# Patient Record
Sex: MALE | Race: OTHER | NOT HISPANIC OR LATINO | Employment: FULL TIME | URBAN - METROPOLITAN AREA
[De-identification: names, ages, dates, MRNs, and addresses within clinical notes are randomized per-mention and may not be internally consistent; named-entity substitution may affect disease eponyms.]

---

## 2017-10-25 ENCOUNTER — OFFICE VISIT (OUTPATIENT)
Dept: MEDICAL GROUP | Facility: PHYSICIAN GROUP | Age: 52
End: 2017-10-25
Payer: MEDICARE

## 2017-10-25 VITALS
DIASTOLIC BLOOD PRESSURE: 78 MMHG | TEMPERATURE: 97.9 F | SYSTOLIC BLOOD PRESSURE: 120 MMHG | RESPIRATION RATE: 16 BRPM | HEART RATE: 71 BPM | HEIGHT: 68 IN | WEIGHT: 254.4 LBS | OXYGEN SATURATION: 97 % | BODY MASS INDEX: 38.55 KG/M2

## 2017-10-25 DIAGNOSIS — G43.709 CHRONIC MIGRAINE WITHOUT AURA WITHOUT STATUS MIGRAINOSUS, NOT INTRACTABLE: ICD-10-CM

## 2017-10-25 DIAGNOSIS — G47.33 OBSTRUCTIVE SLEEP APNEA SYNDROME: ICD-10-CM

## 2017-10-25 DIAGNOSIS — E78.2 MIXED HYPERLIPIDEMIA: ICD-10-CM

## 2017-10-25 DIAGNOSIS — I25.2 HISTORY OF MI (MYOCARDIAL INFARCTION): ICD-10-CM

## 2017-10-25 DIAGNOSIS — E66.9 OBESITY (BMI 35.0-39.9 WITHOUT COMORBIDITY): ICD-10-CM

## 2017-10-25 DIAGNOSIS — Z12.11 SCREENING FOR COLON CANCER: ICD-10-CM

## 2017-10-25 DIAGNOSIS — F43.21 SITUATIONAL DEPRESSION: ICD-10-CM

## 2017-10-25 DIAGNOSIS — I10 ESSENTIAL HYPERTENSION: ICD-10-CM

## 2017-10-25 PROBLEM — F32.A DEPRESSION: Status: ACTIVE | Noted: 2017-10-25

## 2017-10-25 PROBLEM — G43.909 MIGRAINES: Status: ACTIVE | Noted: 2017-10-25

## 2017-10-25 PROBLEM — I25.10 CAD (CORONARY ARTERY DISEASE): Status: ACTIVE | Noted: 2017-10-25

## 2017-10-25 PROBLEM — E78.5 HYPERLIPIDEMIA: Status: ACTIVE | Noted: 2017-10-25

## 2017-10-25 PROCEDURE — 99215 OFFICE O/P EST HI 40 MIN: CPT | Performed by: NURSE PRACTITIONER

## 2017-10-25 RX ORDER — DULOXETIN HYDROCHLORIDE 60 MG/1
60 CAPSULE, DELAYED RELEASE ORAL DAILY
Qty: 90 CAP | Refills: 0 | Status: SHIPPED | OUTPATIENT
Start: 2017-10-25 | End: 2018-07-11 | Stop reason: SDUPTHER

## 2017-10-25 RX ORDER — LISINOPRIL 40 MG/1
40 TABLET ORAL DAILY
Qty: 30 TAB | Refills: 5 | Status: SHIPPED | OUTPATIENT
Start: 2017-10-25 | End: 2017-12-06

## 2017-10-25 RX ORDER — PRAVASTATIN SODIUM 20 MG
20 TABLET ORAL DAILY
Qty: 90 TAB | Refills: 0 | Status: SHIPPED | OUTPATIENT
Start: 2017-10-25 | End: 2018-01-17

## 2017-10-25 RX ORDER — DULOXETIN HYDROCHLORIDE 30 MG/1
60 CAPSULE, DELAYED RELEASE ORAL DAILY
COMMUNITY
End: 2017-10-25 | Stop reason: SDUPTHER

## 2017-10-25 RX ORDER — PRAVASTATIN SODIUM 20 MG
20 TABLET ORAL NIGHTLY
COMMUNITY
End: 2017-10-25 | Stop reason: SDUPTHER

## 2017-10-25 RX ORDER — NITROGLYCERIN 0.4 MG/1
0.4 TABLET SUBLINGUAL PRN
Qty: 25 TAB | Refills: 0
Start: 2017-10-25 | End: 2022-06-16

## 2017-10-25 RX ORDER — CLONAZEPAM 1 MG/1
0.5 TABLET ORAL 2 TIMES DAILY
COMMUNITY
End: 2017-10-31

## 2017-10-25 ASSESSMENT — ENCOUNTER SYMPTOMS
DEPRESSION: 1
NAUSEA: 0
NECK PAIN: 0
CONSTIPATION: 0
HEADACHES: 1
BLURRED VISION: 1
HALLUCINATIONS: 0
ABDOMINAL PAIN: 0
PHOTOPHOBIA: 1
HEADACHES: 0
TINGLING: 0
FALLS: 0
VOMITING: 0
DIZZINESS: 0
MYALGIAS: 0
NERVOUS/ANXIOUS: 1
WHEEZING: 0
SORE THROAT: 0
COUGH: 0
SEIZURES: 0
INSOMNIA: 0
DIARRHEA: 0
CHILLS: 0
LOSS OF BALANCE: 0
SPUTUM PRODUCTION: 0
SHORTNESS OF BREATH: 0
BLOOD IN STOOL: 0
BACK PAIN: 0
EYE PAIN: 0
VISUAL CHANGE: 0
BLURRED VISION: 0
TREMORS: 0
DOUBLE VISION: 0
FEVER: 0
PALPITATIONS: 0
TINGLING: 1

## 2017-10-25 ASSESSMENT — PATIENT HEALTH QUESTIONNAIRE - PHQ9: CLINICAL INTERPRETATION OF PHQ2 SCORE: 0

## 2017-10-25 ASSESSMENT — LIFESTYLE VARIABLES: SUBSTANCE_ABUSE: 1

## 2017-10-25 NOTE — ASSESSMENT & PLAN NOTE
Chronic in nature. He is currently treating symptoms with provastatin 20mg nightly. He denies side effects to mediation, including myalgias. He is requesting a refill of medication today.

## 2017-10-25 NOTE — PROGRESS NOTES
"New Patient appointment    Deo Beard is a 52 y.o. male is a new patient here to establish care. His previous PCP located in [place]. He moved to the area 4 months ago. The following medical concerns were addressed:    HPI:    Sleep apnea  Chronic in nature. He does use BIPAP at night. He was previously followed by pulmonology and requesting a referral for new provider today. He is a former smoker for 20 years; quit 2012. He does admit smoking marijuana on a weekly basis.       Hypertension  Chronic in nature. He is currently taking lisinopril 5 mg daily. He does not monitor BP at home. Denies CP, SOB, peripheral edema, or urinary difficulties. He is experiencing new onset of blurry vision of L eye and is planning to schedule eye appointment within the next few months. He is requesting a refill of medication today. He does reports history of MI that was found \"accidently\" on routine EKG in 2008. He was previously followed by cardiology and requesting a referral for new cardiologist today.    Hyperlipidemia  Chronic in nature. He is currently treating symptoms with provastatin 20mg nightly. He denies side effects to mediation, including myalgias. He is requesting a refill of medication today.    Depression  His onset of depression started beginning of this year due to loss of brother and divorce. He is currently taking Cymbalta 30mg daily, which he believes provides optimal relief. He also takes Klonopin 0.5mg BID. Current symptoms include depressed mood and anxiety. He denies current suicidal and homicidal plan or intent.   He has been coping by riding his motorcycle and taking walks. His current support system includes mother and sister. He is not currently seeing counselor.     Migraine    This is a new problem. Episode onset: 4 months ago, following [place] Spotted Fever infection. Episode frequency: 2x/week. The pain is located in the bilateral and frontal region. The pain does not radiate. The " quality of the pain is described as pulsating. The pain is moderate. Associated symptoms include blurred vision and photophobia. Pertinent negatives include no abdominal pain, coughing, dizziness, eye pain, fever, insomnia, loss of balance, nausea, neck pain, sore throat, tingling or vomiting. The symptoms are aggravated by bright light. He has tried acetaminophen for the symptoms. The treatment provided mild relief. His past medical history is significant for hypertension and obesity.       His medical history was reviewed and updated in medical record.    Current medicines (including changes today)  Current Outpatient Prescriptions   Medication Sig Dispense Refill   • clonazepam (KLONOPIN) 1 MG Tab Take 0.5 mg by mouth 2 times a day.     • nitroglycerin (NITROSTAT) 0.4 MG SL Tab Place 1 Tab under tongue as needed for Chest Pain. 25 Tab 0   • duloxetine (CYMBALTA) 60 MG Cap DR Particles delayed-release capsule Take 1 Cap by mouth every day. 90 Cap 0   • lisinopril (PRINIVIL, ZESTRIL) 40 MG tablet Take 1 Tab by mouth every day. 30 Tab 5   • pravastatin (PRAVACHOL) 20 MG Tab Take 1 Tab by mouth every day. 90 Tab 0     No current facility-administered medications for this visit.      He  has a past medical history of Abnormal EKG (12/27/2012); Abnormal EKG (12/27/2012); Bronchitis; Depression; Diabetes (CMS-HCC); HTN (hypertension); Hyperlipidemia; Hypoxia (12/27/2012); Leukocytosis (leucocytosis) (12/27/2012); Medically noncompliant (7/18/2013); MI (myocardial infarction); Migraine; Personal history of venous thrombosis and embolism; Pneumonia; Polysubstance abuse (7/18/2013); [place] spotted fever; Sleep apnea, obstructive; Substance abuse; and Tobacco abuse (7/18/2013).  He  has a past surgical history that includes wrist orif; incis/drain forearm deep abscess (1996); and gastric banding laparoscopic (2015).  Social History   Substance Use Topics   • Smoking status: Former Smoker     Packs/day: 2.50      Years: 20.00     Types: Cigarettes     Quit date: 2012   • Smokeless tobacco: Never Used   • Alcohol use No     Social History     Social History Narrative   • No narrative on file     Family History   Problem Relation Age of Onset   • No Known Problems Mother    • Heart Disease Father    • Stroke Father    • Cancer Brother      Colon Cancer   • Arthritis Maternal Grandmother    • Arthritis Maternal Grandfather    • Arthritis Paternal Grandmother    • Cancer Paternal Grandfather    • Heart Disease Paternal Grandfather      Family Status   Relation Status   • Mother Alive   • Father    • Brother    • Maternal Grandmother    • Maternal Grandfather    • Paternal Grandmother    • Paternal Grandfather      Review of Systems   Constitutional: Negative for chills, fever and malaise/fatigue.   HENT: Negative for congestion and sore throat.    Eyes: Positive for blurred vision and photophobia. Negative for double vision and pain.   Respiratory: Negative for cough, sputum production, shortness of breath and wheezing.    Cardiovascular: Negative for chest pain, palpitations and leg swelling.   Gastrointestinal: Negative for abdominal pain, blood in stool, constipation, diarrhea, nausea and vomiting.   Genitourinary: Negative for dysuria, frequency, hematuria and urgency.   Musculoskeletal: Negative for falls, joint pain, myalgias and neck pain.   Neurological: Negative for dizziness, tingling, tremors, headaches and loss of balance.   Endo/Heme/Allergies: Negative for environmental allergies.   Psychiatric/Behavioral: Positive for depression and substance abuse (Marijuana ). Negative for hallucinations and suicidal ideas. The patient is nervous/anxious. The patient does not have insomnia.        Depression Screening    Little interest or pleasure in doing things?  0 - not at all  Feeling down, depressed , or hopeless? 0 - not at all  Patient Health Questionnaire Score:  "0    If depressive symptoms identified deferred to follow up visit unless specifically addressed in assesment and plan.      Interpretation of PHQ-9 Total Score   Score Severity   1-4 Minimal Depression   5-9 Mild Depression   10-14 Moderate Depression   15-19 Moderately Severe Depression   20-27 Severe Depression      All other systems reviewed and are negative     Objective:     Blood pressure 120/78, pulse 71, temperature 36.6 °C (97.9 °F), resp. rate 16, height 1.727 m (5' 8\"), weight 115.4 kg (254 lb 6.4 oz), SpO2 97 %. Body mass index is 38.68 kg/m².    Physical Exam   Constitutional: He is oriented to person, place, and time and well-developed, well-nourished, and in no distress. No distress.   HENT:   Head: Normocephalic and atraumatic.   Right Ear: External ear normal.   Left Ear: External ear normal.   Eyes: Conjunctivae and EOM are normal. Pupils are equal, round, and reactive to light.   Neck: Normal range of motion. Neck supple.   Cardiovascular: Normal rate, regular rhythm, normal heart sounds and intact distal pulses.    No murmur heard.  Pulmonary/Chest: Effort normal and breath sounds normal. No respiratory distress. He has no wheezes. He has no rales.   Abdominal: Soft. Bowel sounds are normal. He exhibits no distension and no mass. There is no tenderness. There is no rebound.   Musculoskeletal: Normal range of motion. He exhibits no edema or deformity.   Neurological: He is alert and oriented to person, place, and time. Gait normal.   Skin: Skin is warm and dry.   Psychiatric: Mood, memory, affect and judgment normal.       Assessment and Plan:   The following treatment plan was discussed    1. Obstructive sleep apnea syndrome  Chronic, stable. Continuing using BIPAP at night Referral to pulmonology for routine management of symptoms. Strongly encouraged to d/c marijuana use.  - REFERRAL TO PULMONOLOGY    2. Situational depression  Stable, controlled. Refill for Cymbalta. He is smoking marijuana " regularly and advised not to take benzo concurrently. Patient agreed to taper off Klonopin over the next 1-2 weeks, then discontinue. RTC with worsening symptoms, including suicidal or homicidal ideation.  - duloxetine (CYMBALTA) 60 MG Cap DR Particles delayed-release capsule; Take 1 Cap by mouth every day.  Dispense: 90 Cap; Refill: 0    3. Essential hypertension  Stable, controlled. Refill lisinopril. Routine labs ordered.   - lisinopril (PRINIVIL, ZESTRIL) 40 MG tablet; Take 1 Tab by mouth every day.  Dispense: 30 Tab; Refill: 5  - CBC WITH DIFFERENTIAL; Future  - COMP METABOLIC PANEL; Future  - HEMOGLOBIN A1C; Future    4. History of MI (myocardial infarction)  Stable. Referral to cardiology for routine management.   - REFERRAL TO CARDIOLOGY    5. Mixed hyperlipidemia  Lipid panel ordered. Will reevaluate plan of care based on results. Refill for pravastatin given today.  - pravastatin (PRAVACHOL) 20 MG Tab; Take 1 Tab by mouth every day.  Dispense: 90 Tab; Refill: 0  - LIPID PROFILE; Future    6. Chronic migraine without aura without status migrainosus, not intractable  Stable, uncontrolled. Referral to neurology for further evaluation.   - REFERRAL TO NEUROLOGY    7. Screening for colon cancer  - REFERRAL TO GI FOR COLONOSCOPY    8. Obesity (BMI 35.0-39.9 without comorbidity)  He reports loosing over 230 lbs since gastric sleeve placement 2 years ago. Continue working on lifestyle modifications, including healthy diet and daily physical activity.  - Patient identified as having weight management issue.  Appropriate orders and counseling given.    Records requested.    Total of 40 minutes spent face-to-face time spent with patient, >50% of the total time discussing patient's issues and symptoms as listed above in assessment and plan, as well as managing coordination of care for future evaluation and treatment.    Followup: Return in about 6 months (around 4/25/2018) for For follow-up on, HTN.

## 2017-10-25 NOTE — PROGRESS NOTES
Subjective:   Deo Beard is a 52 y.o. male here today for ***    No problem-specific Assessment & Plan notes found for this encounter.     Migraine    This is a recurrent problem. Episode onset: 4 months ago following [place] Spotted fever infection. Episode frequency: Twice per week. The problem has been unchanged. The pain is located in the bilateral and frontal region. The pain does not radiate. The quality of the pain is described as pulsating. The pain is mild. Associated symptoms include muscle aches and tingling. Pertinent negatives include no back pain, blurred vision, fever, loss of balance, seizures, visual change or vomiting. The symptoms are aggravated by bright light. He has tried acetaminophen for the symptoms. The treatment provided moderate relief.   History of [place] Spotted Fever over 4 months ago. He has been treated in [place]. Will obtain records.          Current medicines (including changes today)  Current Outpatient Prescriptions   Medication Sig Dispense Refill   • duloxetine (CYMBALTA) 30 MG Cap DR Particles Take 60 mg by mouth every day.     • clonazepam (KLONOPIN) 1 MG Tab Take 0.5 mg by mouth 2 times a day.     • pravastatin (PRAVACHOL) 20 MG Tab Take 20 mg by mouth every evening.     • lisinopril (PRINIVIL, ZESTRIL) 40 MG tablet Take 1 Tab by mouth every day. 30 Tab 5   • potassium chloride SA (K-DUR) 20 MEQ TBCR Take 1 Tab by mouth every day. 30 Tab 5   • bumetanide (BUMEX) 1 MG TABS Take 1 Tab by mouth every day. 30 Each 5   • carvedilol (COREG) 6.25 MG TABS Take 1 Tab by mouth 2 times a day, with meals. 60 Tab 5   • doxycycline (VIBRAMYCIN) 100 MG TABS Take 1 Tab by mouth every 12 hours. 14 Tab 0   • loperamide (IMODIUM) 2 MG CAPS Take 1 Cap by mouth 4 times a day as needed for Diarrhea. 30 Each 0     No current facility-administered medications for this visit.      He  has a past medical history of Bronchitis; Depression; HTN (hypertension); Hyperlipidemia; MI  "(myocardial infarction); Migraine; Personal history of venous thrombosis and embolism; Pneumonia; [place] spotted fever; Sleep apnea, obstructive; and Substance abuse.    ROS  {[id number]}       Objective:     Blood pressure 120/78, pulse 71, temperature 36.6 °C (97.9 °F), resp. rate 16, height 1.727 m (5' 8\"), weight 115.4 kg (254 lb 6.4 oz), SpO2 97 %. Body mass index is 38.68 kg/m². ***  Physical Exam:  Constitutional: Alert, no distress.  Skin: Warm, dry, good turgor, no rashes in visible areas.  Eye: Equal, round and reactive, conjunctiva clear, lids normal.  ENMT: Lips without lesions, good dentition, oropharynx clear. Both ears: external ear canals clear, no redness or drainage.TM intact, pearly grey with landmarks visualized. Nasal mucosa pink, no drainage.   Neck: Trachea midline, no masses, no thyromegaly. No cervical or supraclavicular lymphadenopathy  Respiratory: Unlabored respiratory effort, lungs clear to auscultation, no wheezes, no rhonchi.  Cardiovascular: Normal S1, S2, no murmur, no edema.  Abdomen: Soft, non-tender, no masses, no hepatosplenomegaly.  Psych: Alert and oriented x3, normal affect and mood.        Assessment and Plan:   The following treatment plan was discussed    There are no diagnoses linked to this encounter.    Followup: No Follow-up on file.         "

## 2017-10-25 NOTE — ASSESSMENT & PLAN NOTE
"Chronic in nature. He is currently taking lisinopril 5 mg daily. He does not monitor BP at home. Denies CP, SOB, peripheral edema, or urinary difficulties. He is experiencing new onset of blurry vision of L eye and is planning to schedule eye appointment within the next few months. He is requesting a refill of medication today. He does reports history of MI that was found \"accidently\" on routine EKG in 2008. He was previously followed by cardiology and requesting a referral for new cardiologist today.  "

## 2017-10-25 NOTE — ASSESSMENT & PLAN NOTE
Chronic in nature. He does use BIPAP at night. He was previously followed by pulmonology and requesting a referral for new provider today. He is a former smoker for 20 years; quit 2012. He does admit smoking marijuana on a weekly basis.

## 2017-10-30 ENCOUNTER — NON-PROVIDER VISIT (OUTPATIENT)
Dept: MEDICAL GROUP | Facility: CLINIC | Age: 52
End: 2017-10-30
Payer: MEDICARE

## 2017-10-30 DIAGNOSIS — Z01.89 ROUTINE LAB DRAW: ICD-10-CM

## 2017-10-30 PROCEDURE — 99000 SPECIMEN HANDLING OFFICE-LAB: CPT | Performed by: FAMILY MEDICINE

## 2017-10-30 PROCEDURE — 36415 COLL VENOUS BLD VENIPUNCTURE: CPT | Performed by: FAMILY MEDICINE

## 2017-10-31 ENCOUNTER — HOSPITAL ENCOUNTER (OUTPATIENT)
Dept: LAB | Facility: MEDICAL CENTER | Age: 52
End: 2017-10-31
Attending: NURSE PRACTITIONER
Payer: MEDICARE

## 2017-10-31 ENCOUNTER — OFFICE VISIT (OUTPATIENT)
Dept: MEDICAL GROUP | Facility: PHYSICIAN GROUP | Age: 52
End: 2017-10-31
Payer: MEDICARE

## 2017-10-31 VITALS
HEIGHT: 68 IN | WEIGHT: 253 LBS | RESPIRATION RATE: 16 BRPM | OXYGEN SATURATION: 96 % | DIASTOLIC BLOOD PRESSURE: 78 MMHG | SYSTOLIC BLOOD PRESSURE: 122 MMHG | HEART RATE: 63 BPM | TEMPERATURE: 98.1 F | BODY MASS INDEX: 38.34 KG/M2

## 2017-10-31 DIAGNOSIS — L03.119 CELLULITIS OF FOOT: ICD-10-CM

## 2017-10-31 DIAGNOSIS — I10 ESSENTIAL HYPERTENSION: ICD-10-CM

## 2017-10-31 DIAGNOSIS — E78.2 MIXED HYPERLIPIDEMIA: ICD-10-CM

## 2017-10-31 LAB
ALBUMIN SERPL BCP-MCNC: 3.4 G/DL (ref 3.2–4.9)
ALBUMIN/GLOB SERPL: 0.8 G/DL
ALP SERPL-CCNC: 83 U/L (ref 30–99)
ALT SERPL-CCNC: 7 U/L (ref 2–50)
ANION GAP SERPL CALC-SCNC: 8 MMOL/L (ref 0–11.9)
AST SERPL-CCNC: 15 U/L (ref 12–45)
BASOPHILS # BLD AUTO: 1.2 % (ref 0–1.8)
BASOPHILS # BLD: 0.12 K/UL (ref 0–0.12)
BILIRUB SERPL-MCNC: 0.6 MG/DL (ref 0.1–1.5)
BUN SERPL-MCNC: 12 MG/DL (ref 8–22)
CALCIUM SERPL-MCNC: 9.2 MG/DL (ref 8.5–10.5)
CHLORIDE SERPL-SCNC: 102 MMOL/L (ref 96–112)
CHOLEST SERPL-MCNC: 160 MG/DL (ref 100–199)
CO2 SERPL-SCNC: 27 MMOL/L (ref 20–33)
CREAT SERPL-MCNC: 0.56 MG/DL (ref 0.5–1.4)
EOSINOPHIL # BLD AUTO: 0.15 K/UL (ref 0–0.51)
EOSINOPHIL NFR BLD: 1.6 % (ref 0–6.9)
ERYTHROCYTE [DISTWIDTH] IN BLOOD BY AUTOMATED COUNT: 40.4 FL (ref 35.9–50)
EST. AVERAGE GLUCOSE BLD GHB EST-MCNC: 103 MG/DL
GFR SERPL CREATININE-BSD FRML MDRD: >60 ML/MIN/1.73 M 2
GLOBULIN SER CALC-MCNC: 4.5 G/DL (ref 1.9–3.5)
GLUCOSE SERPL-MCNC: 86 MG/DL (ref 65–99)
HBA1C MFR BLD: 5.2 % (ref 0–5.6)
HCT VFR BLD AUTO: 43.3 % (ref 42–52)
HDLC SERPL-MCNC: 42 MG/DL
HGB BLD-MCNC: 15 G/DL (ref 14–18)
IMM GRANULOCYTES # BLD AUTO: 0.02 K/UL (ref 0–0.11)
IMM GRANULOCYTES NFR BLD AUTO: 0.2 % (ref 0–0.9)
LDLC SERPL CALC-MCNC: 101 MG/DL
LYMPHOCYTES # BLD AUTO: 3.09 K/UL (ref 1–4.8)
LYMPHOCYTES NFR BLD: 32.1 % (ref 22–41)
MCH RBC QN AUTO: 30.4 PG (ref 27–33)
MCHC RBC AUTO-ENTMCNC: 34.6 G/DL (ref 33.7–35.3)
MCV RBC AUTO: 87.7 FL (ref 81.4–97.8)
MONOCYTES # BLD AUTO: 0.53 K/UL (ref 0–0.85)
MONOCYTES NFR BLD AUTO: 5.5 % (ref 0–13.4)
NEUTROPHILS # BLD AUTO: 5.73 K/UL (ref 1.82–7.42)
NEUTROPHILS NFR BLD: 59.4 % (ref 44–72)
NRBC # BLD AUTO: 0 K/UL
NRBC BLD AUTO-RTO: 0 /100 WBC
PLATELET # BLD AUTO: 200 K/UL (ref 164–446)
PMV BLD AUTO: 12 FL (ref 9–12.9)
POTASSIUM SERPL-SCNC: 3.9 MMOL/L (ref 3.6–5.5)
PROT SERPL-MCNC: 7.9 G/DL (ref 6–8.2)
RBC # BLD AUTO: 4.94 M/UL (ref 4.7–6.1)
SODIUM SERPL-SCNC: 137 MMOL/L (ref 135–145)
TRIGL SERPL-MCNC: 83 MG/DL (ref 0–149)
WBC # BLD AUTO: 9.6 K/UL (ref 4.8–10.8)

## 2017-10-31 PROCEDURE — 85025 COMPLETE CBC W/AUTO DIFF WBC: CPT

## 2017-10-31 PROCEDURE — 83036 HEMOGLOBIN GLYCOSYLATED A1C: CPT | Mod: GA

## 2017-10-31 PROCEDURE — 99214 OFFICE O/P EST MOD 30 MIN: CPT | Performed by: NURSE PRACTITIONER

## 2017-10-31 PROCEDURE — 80053 COMPREHEN METABOLIC PANEL: CPT

## 2017-10-31 PROCEDURE — 80061 LIPID PANEL: CPT

## 2017-10-31 PROCEDURE — 36415 COLL VENOUS BLD VENIPUNCTURE: CPT

## 2017-10-31 RX ORDER — SULFAMETHOXAZOLE AND TRIMETHOPRIM 800; 160 MG/1; MG/1
1 TABLET ORAL 2 TIMES DAILY
Qty: 10 TAB | Refills: 0 | Status: SHIPPED | OUTPATIENT
Start: 2017-10-31 | End: 2017-11-05

## 2017-10-31 RX ORDER — IBUPROFEN 600 MG/1
600 TABLET ORAL EVERY 6 HOURS PRN
Qty: 30 TAB | Refills: 0 | Status: SHIPPED | OUTPATIENT
Start: 2017-10-31 | End: 2022-06-16

## 2017-10-31 ASSESSMENT — ENCOUNTER SYMPTOMS
MYALGIAS: 1
DIARRHEA: 0
HEADACHES: 0
NECK PAIN: 0
FEVER: 0
COUGH: 0
TINGLING: 1
VOMITING: 0
NUMBNESS: 1
WEAKNESS: 0
FALLS: 0
NAUSEA: 0
CHILLS: 0
JOINT SWELLING: 1
BACK PAIN: 0
ABDOMINAL PAIN: 0
PALPITATIONS: 0
CONSTIPATION: 0

## 2017-10-31 ASSESSMENT — PAIN SCALES - GENERAL: PAINLEVEL: 6=MODERATE PAIN

## 2017-10-31 NOTE — PROGRESS NOTES
Subjective:   Deo Beard is a 52 y.o. male here today for foot pain/swelling.     Foot Swelling   This is a new problem. Episode onset: 3 days ago. Pain and swelling started 3rd digit of L foot. The problem occurs constantly. The problem has been gradually worsening (Pain and swelling has progressed to entire foot up to ankle). Associated symptoms include joint swelling, myalgias and numbness. Pertinent negatives include no abdominal pain, chest pain, chills, congestion, coughing, fever, headaches, nausea, neck pain, rash, vomiting or weakness. Exacerbated by: Movement. He has tried acetaminophen for the symptoms. The treatment provided mild relief.   He denies any recent injury or falls. He is unsure of insect exposure and hasn't been outside barefoot. He does have history of [place] Spotted Fever that was diagnosed and treated in April 2017.     Current medicines (including changes today)  Current Outpatient Prescriptions   Medication Sig Dispense Refill   • sulfamethoxazole-trimethoprim (BACTRIM DS) 800-160 MG tablet Take 1 Tab by mouth 2 times a day for 5 days. 10 Tab 0   • ibuprofen (MOTRIN) 600 MG Tab Take 1 Tab by mouth every 6 hours as needed. 30 Tab 0   • nitroglycerin (NITROSTAT) 0.4 MG SL Tab Place 1 Tab under tongue as needed for Chest Pain. 25 Tab 0   • duloxetine (CYMBALTA) 60 MG Cap DR Particles delayed-release capsule Take 1 Cap by mouth every day. 90 Cap 0   • lisinopril (PRINIVIL, ZESTRIL) 40 MG tablet Take 1 Tab by mouth every day. 30 Tab 5   • pravastatin (PRAVACHOL) 20 MG Tab Take 1 Tab by mouth every day. 90 Tab 0     No current facility-administered medications for this visit.      He  has a past medical history of Abnormal EKG (12/27/2012); Abnormal EKG (12/27/2012); Bronchitis; Depression; Diabetes (CMS-HCC); HTN (hypertension); Hyperlipidemia; Hypoxia (12/27/2012); Leukocytosis (leucocytosis) (12/27/2012); Medically noncompliant (7/18/2013); MI (myocardial infarction);  "Migraine; Personal history of venous thrombosis and embolism; Pneumonia; Polysubstance abuse (7/18/2013); [place] spotted fever; Sleep apnea, obstructive; Substance abuse; and Tobacco abuse (7/18/2013).    Review of Systems   Constitutional: Negative for chills, fever and malaise/fatigue.   HENT: Negative for congestion.    Respiratory: Negative for cough.    Cardiovascular: Negative for chest pain, palpitations and leg swelling.   Gastrointestinal: Negative for abdominal pain, constipation, diarrhea, nausea and vomiting.   Musculoskeletal: Positive for joint pain, joint swelling and myalgias. Negative for back pain, falls and neck pain.   Skin: Negative for rash.   Neurological: Positive for tingling and numbness. Negative for weakness and headaches.      Objective:     Blood pressure 122/78, pulse 63, temperature 36.7 °C (98.1 °F), resp. rate 16, height 1.727 m (5' 8\"), weight 114.8 kg (253 lb), SpO2 96 %. Body mass index is 38.47 kg/m².     Physical Exam   Constitutional: He is well-developed, well-nourished, and in no distress. No distress.   HENT:   Head: Normocephalic and atraumatic.   Eyes: Conjunctivae and EOM are normal. Pupils are equal, round, and reactive to light.   Neck: Normal range of motion. Neck supple.   Cardiovascular: Normal rate, regular rhythm, normal heart sounds and intact distal pulses.  Exam reveals no friction rub.    No murmur heard.  Pulmonary/Chest: Effort normal and breath sounds normal.   Musculoskeletal:        Left ankle: Normal.        Left foot: There is decreased range of motion, tenderness and swelling. There is normal capillary refill and no laceration.   Skin: Skin is warm and dry. There is erythema (L foot).   Psychiatric: Mood, memory, affect and judgment normal.       Assessment and Plan:   The following treatment plan was discussed    1. Cellulitis of foot  Symptoms most consistent with cellulitis. Order for Bactrim and Ibuprofen. Recommend rest, ice, compression " with ace bandage, ice, and ibuprofen as needed for pain/inflammation. RTC with no improvement of symptoms.  - sulfamethoxazole-trimethoprim (BACTRIM DS) 800-160 MG tablet; Take 1 Tab by mouth 2 times a day for 5 days.  Dispense: 10 Tab; Refill: 0  - ibuprofen (MOTRIN) 600 MG Tab; Take 1 Tab by mouth every 6 hours as needed.  Dispense: 30 Tab; Refill: 0    Followup: Return if symptoms worsen or fail to improve.

## 2017-11-01 ENCOUNTER — TELEPHONE (OUTPATIENT)
Dept: MEDICAL GROUP | Facility: PHYSICIAN GROUP | Age: 52
End: 2017-11-01

## 2017-11-01 NOTE — TELEPHONE ENCOUNTER
----- Message from DUSTY Pryor sent at 11/1/2017 11:38 AM PDT -----  Labs reviewed. CBC, CMP, HA1C, and lipid profile all WNL. Please notify patient of normal lab results.    DUSTY Pryor,VERA

## 2017-12-06 ENCOUNTER — OFFICE VISIT (OUTPATIENT)
Dept: CARDIOLOGY | Facility: PHYSICIAN GROUP | Age: 52
End: 2017-12-06
Payer: MEDICARE

## 2017-12-06 VITALS
DIASTOLIC BLOOD PRESSURE: 70 MMHG | HEART RATE: 70 BPM | BODY MASS INDEX: 38.65 KG/M2 | WEIGHT: 255 LBS | SYSTOLIC BLOOD PRESSURE: 128 MMHG | OXYGEN SATURATION: 94 % | HEIGHT: 68 IN

## 2017-12-06 DIAGNOSIS — I25.10 CORONARY ARTERY DISEASE INVOLVING NATIVE CORONARY ARTERY OF NATIVE HEART WITHOUT ANGINA PECTORIS: ICD-10-CM

## 2017-12-06 DIAGNOSIS — I10 ESSENTIAL HYPERTENSION: ICD-10-CM

## 2017-12-06 DIAGNOSIS — I25.5 ISCHEMIC CARDIOMYOPATHY: ICD-10-CM

## 2017-12-06 DIAGNOSIS — E78.2 MIXED HYPERLIPIDEMIA: ICD-10-CM

## 2017-12-06 PROCEDURE — 99204 OFFICE O/P NEW MOD 45 MIN: CPT | Performed by: INTERNAL MEDICINE

## 2017-12-06 RX ORDER — LISINOPRIL 10 MG/1
5 TABLET ORAL DAILY
Qty: 90 TAB | Refills: 3 | Status: SHIPPED | OUTPATIENT
Start: 2017-12-06 | End: 2018-01-17

## 2017-12-06 ASSESSMENT — ENCOUNTER SYMPTOMS
NAUSEA: 1
ABDOMINAL PAIN: 0
DIZZINESS: 0
SHORTNESS OF BREATH: 0
NERVOUS/ANXIOUS: 0
FEVER: 0
BLURRED VISION: 0
PALPITATIONS: 1
LOSS OF CONSCIOUSNESS: 0
BLOOD IN STOOL: 0
CHILLS: 0
DOUBLE VISION: 0
DEPRESSION: 1
MYALGIAS: 1

## 2017-12-06 NOTE — PROGRESS NOTES
"Subjective:   Deo Beard is a 52 y.o. male with know h/o  1. CAD- Details not available based on EKG inferolateral MI.  2. Ischemic cardiomyopathy  3. History of morbid obesity status post gastric bypass surgery lost approximately 200 lb  4. Sleep apnea on BiPAP  5. Hypertension  6. Dyslipidemia  Presenting to establish care in our office.    Patient recently moved from [place], unfortunately I don't have any records from patient's prior cardiologist. He does have intermittent episodes of chest tightness but no complaints of chest pain or pressure. He has prescription for nitroglycerin but has not taken nitroglycerin in the last one year. Intermittent episodes of fluttering sensation in the chest lasting only for a few seconds denied any specific aggravating or relieving factors. Chronic nausea following bypass surgery. No history of depression well controlled on medications. Intermittent episodes of lower extremity edema denied any complaints of orthopnea or PND. No complaints of dizziness lightheadedness or LOC.  Past Medical History:   Diagnosis Date   • Abnormal EKG 12/27/2012   • Abnormal EKG 12/27/2012   • Bronchitis    • CAD (coronary artery disease) 2008   • Depression    • Diabetes (CMS-HCC)    • HTN (hypertension)    • Hyperlipidemia    • Hypoxia 12/27/2012   • Ischemic cardiomyopathy    • Leukocytosis (leucocytosis) 12/27/2012   • Medically noncompliant 7/18/2013   • MI (myocardial infarction)     Silent MI , ( old), noted on EKG in 2008   • Migraine    • Personal history of venous thrombosis and embolism     leg \"years ago\"   • Pneumonia     5 months ago   • Polysubstance abuse 7/18/2013   • [place] spotted fever     June 2017   • Sleep apnea, obstructive     CPAP 2010   • Substance abuse     2007; Meth   • Tobacco abuse 7/18/2013     Past Surgical History:   Procedure Laterality Date   • GASTRIC BANDING LAPAROSCOPIC  2015   • PB INCIS/DRAIN FOREARM DEEP ABSCESS  1996    left AC due to IV " drug abuse   • WRIST ORIF      left wrist     Family History   Problem Relation Age of Onset   • No Known Problems Mother    • Heart Disease Father      CHF   • Stroke Father    • Heart Attack Father    • Cancer Brother      Colon Cancer   • Arthritis Maternal Grandmother    • Arthritis Maternal Grandfather    • Arthritis Paternal Grandmother    • Cancer Paternal Grandfather    • Heart Disease Paternal Grandfather      History   Smoking Status   • Former Smoker   • Packs/day: 2.50   • Years: 20.00   • Types: Cigarettes   • Quit date: 9/26/2012   Smokeless Tobacco   • Never Used     No Known Allergies  Outpatient Encounter Prescriptions as of 12/6/2017   Medication Sig Dispense Refill   • ibuprofen (MOTRIN) 600 MG Tab Take 1 Tab by mouth every 6 hours as needed. 30 Tab 0   • duloxetine (CYMBALTA) 60 MG Cap DR Particles delayed-release capsule Take 1 Cap by mouth every day. 90 Cap 0   • lisinopril (PRINIVIL, ZESTRIL) 40 MG tablet Take 1 Tab by mouth every day. 30 Tab 5   • pravastatin (PRAVACHOL) 20 MG Tab Take 1 Tab by mouth every day. 90 Tab 0   • nitroglycerin (NITROSTAT) 0.4 MG SL Tab Place 1 Tab under tongue as needed for Chest Pain. 25 Tab 0     No facility-administered encounter medications on file as of 12/6/2017.      Review of Systems   Constitutional: Negative for chills and fever.   HENT: Negative for hearing loss and tinnitus.         Head ache    Eyes: Negative for blurred vision and double vision.   Respiratory: Negative for shortness of breath.    Cardiovascular: Positive for chest pain, palpitations and leg swelling.        Chest tightness   Gastrointestinal: Positive for nausea. Negative for abdominal pain, blood in stool and melena.   Genitourinary: Negative for dysuria and hematuria.   Musculoskeletal: Positive for joint pain and myalgias.   Skin: Negative for rash.   Neurological: Negative for dizziness and loss of consciousness.   Psychiatric/Behavioral: Positive for depression. The patient is  "not nervous/anxious.         Objective:   /70   Pulse 70   Ht 1.727 m (5' 8\")   Wt 115.7 kg (255 lb)   SpO2 94%   BMI 38.77 kg/m²     Physical Exam   Constitutional: He is oriented to person, place, and time. He appears well-developed and well-nourished. No distress.   HENT:   Head: Normocephalic and atraumatic.   Mouth/Throat: No oropharyngeal exudate.   Eyes: Pupils are equal, round, and reactive to light. Right eye exhibits no discharge. Left eye exhibits no discharge.   Neck: No JVD (difficult to assess) present. No tracheal deviation present.   Cardiovascular: Normal rate and regular rhythm.    No murmur heard.  Pulmonary/Chest: Effort normal and breath sounds normal. No respiratory distress. He has no wheezes.   Abdominal: Soft. Bowel sounds are normal. He exhibits no distension. There is no tenderness.   Musculoskeletal: Normal range of motion. He exhibits no edema.   Neurological: He is alert and oriented to person, place, and time. No cranial nerve deficit.   Skin: Skin is warm and dry. He is not diaphoretic. No erythema.   Psychiatric: He has a normal mood and affect. His behavior is normal.   EK17  EKG personally reviewed by me.  Sinus rhythm right bundle branch block inferolateral Q waves    Results for SEBASTIÁN JANELLE KING (MRN [id number]) as of 2017 09:32   10/31/2017   Sodium 137   Potassium 3.9   Chloride 102   Co2 27   Anion Gap 8.0   Glucose 86   Bun 12   Creatinine 0.56   GFR If Non African American >60   Calcium 9.2   AST(SGOT) 15   ALT(SGPT) 7   Alkaline Phosphatase 83   Glycohemoglobin 5.2   Estim. Avg Glu 103   Cholesterol,Tot 160   Triglycerides 83   HDL 42    (H)     Assessment:     1. Essential hypertension  EKG   2. CAD - ?  3. Ischemic cardiomyopathy  4. Dyslipidemia    Medical Decision Making:  Today's Assessment / Status / Plan:     1. Patient hasn't been taking any of his medications for the past 2-3 weeks.  Start lisinopril 5 mg daily  Advised patient to " monitor blood pressure closely at home based on the blood pressure recordings will titrate antihypertensive agents.  2. Continue aspirin 81 mg daily  Will try to get records from patient's prior cardiologist.  3. Details available.  Patient recently had echocardiogram as well as stress test will try to get those results.  If LVEF is low will start patient on beta blockers.  4. Advised patient to restart taking pravastatin  Check labs prior to next visit.    Will try to get records from patient's prior cardiologist.  Labs prior to next visit.  Patient has known history of noncompliance with medications strongly advised patient to continue taking the current updated medications.  Follow-up in 6 weeks.    Thank you for allowing me to participate in taking care of patient.    Edna Jones MD.

## 2017-12-06 NOTE — LETTER
"     [facility]   [address] [place] [address]  Phone: [phone]  Fax: [phone]              Deo Beard  1965    Encounter Date: 12/6/2017    Edna Velasco M.D.          PROGRESS NOTE:  Subjective:   Deo Beard is a 52 y.o. male with know h/o  1. CAD- Details not available based on EKG inferolateral MI.  2. Ischemic cardiomyopathy  3. History of morbid obesity status post gastric bypass surgery lost approximately 200 lb  4. Sleep apnea on BiPAP  5. Hypertension  6. Dyslipidemia  Presenting to establish care in our office.    Patient recently moved from [place], unfortunately I don't have any records from patient's prior cardiologist. He does have intermittent episodes of chest tightness but no complaints of chest pain or pressure. He has prescription for nitroglycerin but has not taken nitroglycerin in the last one year. Intermittent episodes of fluttering sensation in the chest lasting only for a few seconds denied any specific aggravating or relieving factors. Chronic nausea following bypass surgery. No history of depression well controlled on medications. Intermittent episodes of lower extremity edema denied any complaints of orthopnea or PND. No complaints of dizziness lightheadedness or LOC.  Past Medical History:   Diagnosis Date   • Abnormal EKG 12/27/2012   • Abnormal EKG 12/27/2012   • Bronchitis    • CAD (coronary artery disease) 2008   • Depression    • Diabetes (CMS-[facility])    • HTN (hypertension)    • Hyperlipidemia    • Hypoxia 12/27/2012   • Ischemic cardiomyopathy    • Leukocytosis (leucocytosis) 12/27/2012   • Medically noncompliant 7/18/2013   • MI (myocardial infarction)     Silent MI , ( old), noted on EKG in 2008   • Migraine    • Personal history of venous thrombosis and embolism     leg \"years ago\"   • Pneumonia     5 months ago   • Polysubstance abuse 7/18/2013   • [place] spotted fever    " June 2017   • Sleep apnea, obstructive     CPAP 2010   • Substance abuse     2007; Meth   • Tobacco abuse 7/18/2013     Past Surgical History:   Procedure Laterality Date   • GASTRIC BANDING LAPAROSCOPIC  2015   • PB INCIS/DRAIN FOREARM DEEP ABSCESS  1996    left AC due to IV drug abuse   • WRIST ORIF      left wrist     Family History   Problem Relation Age of Onset   • No Known Problems Mother    • Heart Disease Father      CHF   • Stroke Father    • Heart Attack Father    • Cancer Brother      Colon Cancer   • Arthritis Maternal Grandmother    • Arthritis Maternal Grandfather    • Arthritis Paternal Grandmother    • Cancer Paternal Grandfather    • Heart Disease Paternal Grandfather      History   Smoking Status   • Former Smoker   • Packs/day: 2.50   • Years: 20.00   • Types: Cigarettes   • Quit date: 9/26/2012   Smokeless Tobacco   • Never Used     No Known Allergies  Outpatient Encounter Prescriptions as of 12/6/2017   Medication Sig Dispense Refill   • ibuprofen (MOTRIN) 600 MG Tab Take 1 Tab by mouth every 6 hours as needed. 30 Tab 0   • duloxetine (CYMBALTA) 60 MG Cap DR Particles delayed-release capsule Take 1 Cap by mouth every day. 90 Cap 0   • lisinopril (PRINIVIL, ZESTRIL) 40 MG tablet Take 1 Tab by mouth every day. 30 Tab 5   • pravastatin (PRAVACHOL) 20 MG Tab Take 1 Tab by mouth every day. 90 Tab 0   • nitroglycerin (NITROSTAT) 0.4 MG SL Tab Place 1 Tab under tongue as needed for Chest Pain. 25 Tab 0     No facility-administered encounter medications on file as of 12/6/2017.      Review of Systems   Constitutional: Negative for chills and fever.   HENT: Negative for hearing loss and tinnitus.         Head ache    Eyes: Negative for blurred vision and double vision.   Respiratory: Negative for shortness of breath.    Cardiovascular: Positive for chest pain, palpitations and leg swelling.        Chest tightness   Gastrointestinal: Positive for nausea. Negative for abdominal pain, blood in stool and  "melena.   Genitourinary: Negative for dysuria and hematuria.   Musculoskeletal: Positive for joint pain and myalgias.   Skin: Negative for rash.   Neurological: Negative for dizziness and loss of consciousness.   Psychiatric/Behavioral: Positive for depression. The patient is not nervous/anxious.         Objective:   /70   Pulse 70   Ht 1.727 m (5' 8\")   Wt 115.7 kg (255 lb)   SpO2 94%   BMI 38.77 kg/m²      Physical Exam   Constitutional: He is oriented to person, place, and time. He appears well-developed and well-nourished. No distress.   HENT:   Head: Normocephalic and atraumatic.   Mouth/Throat: No oropharyngeal exudate.   Eyes: Pupils are equal, round, and reactive to light. Right eye exhibits no discharge. Left eye exhibits no discharge.   Neck: No JVD (difficult to assess) present. No tracheal deviation present.   Cardiovascular: Normal rate and regular rhythm.    No murmur heard.  Pulmonary/Chest: Effort normal and breath sounds normal. No respiratory distress. He has no wheezes.   Abdominal: Soft. Bowel sounds are normal. He exhibits no distension. There is no tenderness.   Musculoskeletal: Normal range of motion. He exhibits no edema.   Neurological: He is alert and oriented to person, place, and time. No cranial nerve deficit.   Skin: Skin is warm and dry. He is not diaphoretic. No erythema.   Psychiatric: He has a normal mood and affect. His behavior is normal.     Results for JANELLE LOWERY (MRN [id number]) as of 12/6/2017 09:32   10/31/2017   Sodium 137   Potassium 3.9   Chloride 102   Co2 27   Anion Gap 8.0   Glucose 86   Bun 12   Creatinine 0.56   GFR If Non African American >60   Calcium 9.2   AST(SGOT) 15   ALT(SGPT) 7   Alkaline Phosphatase 83   Glycohemoglobin 5.2   Estim. Avg Glu 103   Cholesterol,Tot 160   Triglycerides 83   HDL 42    (H)     Assessment:     1. Essential hypertension  EKG   2. CAD - ?  3. Ischemic cardiomyopathy  4. Dyslipidemia    Medical Decision Making: "  Today's Assessment / Status / Plan:     1. Patient hasn't been taking any of his medications for the past 2-3 weeks.  Start lisinopril 5 mg daily  Advised patient to monitor blood pressure closely at home based on the blood pressure recordings will titrate antihypertensive agents.  2. Continue aspirin 81 mg daily  Will try to get records from patient's prior cardiologist.  3. Details available.  Patient recently had echocardiogram as well as stress test will try to get those results.  If LVEF is low will start patient on beta blockers.  4. Advised patient to restart taking pravastatin  Check labs prior to next visit.    Will try to get records from patient's prior cardiologist.  Labs prior to next visit.  Patient has known history of noncompliance with medications strongly advised patient to continue taking the current updated medications.  Follow-up in 6 weeks.    Thank you for allowing me to participate in taking care of patient.    Edna Jones MD.      Nadira Prasad, A.P.R.N.  [address] [address]  VIA In Basket

## 2018-01-04 ENCOUNTER — OFFICE VISIT (OUTPATIENT)
Dept: MEDICAL GROUP | Facility: PHYSICIAN GROUP | Age: 53
End: 2018-01-04
Payer: MEDICARE

## 2018-01-04 VITALS
OXYGEN SATURATION: 96 % | HEIGHT: 68 IN | DIASTOLIC BLOOD PRESSURE: 79 MMHG | RESPIRATION RATE: 16 BRPM | SYSTOLIC BLOOD PRESSURE: 128 MMHG | HEART RATE: 98 BPM | WEIGHT: 256.6 LBS | TEMPERATURE: 97.2 F | BODY MASS INDEX: 38.89 KG/M2

## 2018-01-04 DIAGNOSIS — B96.89 ACUTE BACTERIAL SINUSITIS: ICD-10-CM

## 2018-01-04 DIAGNOSIS — J01.90 ACUTE BACTERIAL SINUSITIS: ICD-10-CM

## 2018-01-04 PROCEDURE — 99214 OFFICE O/P EST MOD 30 MIN: CPT | Performed by: NURSE PRACTITIONER

## 2018-01-04 RX ORDER — AMOXICILLIN AND CLAVULANATE POTASSIUM 875; 125 MG/1; MG/1
1 TABLET, FILM COATED ORAL 2 TIMES DAILY
Qty: 14 TAB | Refills: 0 | Status: SHIPPED | OUTPATIENT
Start: 2018-01-04 | End: 2018-01-11

## 2018-01-04 RX ORDER — FLUTICASONE PROPIONATE 50 MCG
1-2 SPRAY, SUSPENSION (ML) NASAL 2 TIMES DAILY PRN
Qty: 1 BOTTLE | Refills: 3 | Status: SHIPPED | OUTPATIENT
Start: 2018-01-04 | End: 2022-06-16

## 2018-01-04 ASSESSMENT — ENCOUNTER SYMPTOMS
COUGH: 1
DOUBLE VISION: 0
VOMITING: 0
SPUTUM PRODUCTION: 0
NECK PAIN: 0
FEVER: 1
DIAPHORESIS: 0
HEMOPTYSIS: 0
PALPITATIONS: 0
BLURRED VISION: 0
NAUSEA: 0
ORTHOPNEA: 0
SORE THROAT: 1
DIARRHEA: 0
WEAKNESS: 0
RHINORRHEA: 1
EYE PAIN: 0
WHEEZING: 1
SINUS PAIN: 1
SHORTNESS OF BREATH: 0
ABDOMINAL PAIN: 0
DIZZINESS: 0
HEADACHES: 1
CHILLS: 0

## 2018-01-04 NOTE — PROGRESS NOTES
Subjective:   Deo Beard is a 52 y.o. male here today for cough and congestion x 3 weeks.     URI    This is a new problem. Episode onset: 3 weeks ago. The problem has been gradually worsening. The maximum temperature recorded prior to his arrival was 101 - 101.9 F. Associated symptoms include congestion, coughing, headaches, a plugged ear sensation, rhinorrhea, sinus pain, a sore throat and wheezing. Pertinent negatives include no abdominal pain, chest pain, diarrhea, ear pain, joint pain, nausea, neck pain or vomiting. Treatments tried: Cough drops and tylenol  The treatment provided mild relief.   He was last treated with antibiotics on 10/13/17 for cellulitis of foot.    Current medicines (including changes today)  Current Outpatient Prescriptions   Medication Sig Dispense Refill   • amoxicillin-clavulanate (AUGMENTIN) 875-125 MG Tab Take 1 Tab by mouth 2 times a day for 7 days. 14 Tab 0   • fluticasone (FLONASE) 50 MCG/ACT nasal spray Spray 1-2 Sprays in nose 2 times a day as needed. 1 Bottle 3   • lisinopril (PRINIVIL) 10 MG Tab Take 0.5 Tabs by mouth every day. 90 Tab 3   • nitroglycerin (NITROSTAT) 0.4 MG SL Tab Place 1 Tab under tongue as needed for Chest Pain. 25 Tab 0   • duloxetine (CYMBALTA) 60 MG Cap DR Particles delayed-release capsule Take 1 Cap by mouth every day. 90 Cap 0   • pravastatin (PRAVACHOL) 20 MG Tab Take 1 Tab by mouth every day. 90 Tab 0   • ibuprofen (MOTRIN) 600 MG Tab Take 1 Tab by mouth every 6 hours as needed. 30 Tab 0     No current facility-administered medications for this visit.      He  has a past medical history of Abnormal EKG (12/27/2012); Abnormal EKG (12/27/2012); Bronchitis; CAD (coronary artery disease) (2008); Depression; Diabetes (CMS-[facility]); HTN (hypertension); Hyperlipidemia; Hypoxia (12/27/2012); Ischemic cardiomyopathy; Leukocytosis (leucocytosis) (12/27/2012); Medically noncompliant (7/18/2013); MI (myocardial infarction); Migraine; Personal history of  "venous thrombosis and embolism; Pneumonia; Polysubstance abuse (7/18/2013); [place] spotted fever; Sleep apnea, obstructive; Substance abuse; and Tobacco abuse (7/18/2013).    Social History     Social History   • Marital status: Single     Spouse name: N/A   • Number of children: N/A   • Years of education: N/A     Occupational History   • Not on file.     Social History Main Topics   • Smoking status: Former Smoker     Packs/day: 2.50     Years: 20.00     Types: Cigarettes     Quit date: 9/26/2012   • Smokeless tobacco: Never Used   • Alcohol use No   • Drug use:      Types: Intravenous, Injected (Skin Popping), Marijuana      Comment: occasionally   • Sexual activity: No     Other Topics Concern   • Not on file     Social History Narrative   • No narrative on file       Review of Systems   Constitutional: Positive for fever and malaise/fatigue. Negative for chills and diaphoresis.   HENT: Positive for congestion, rhinorrhea, sinus pain and sore throat. Negative for ear discharge and ear pain.    Eyes: Negative for blurred vision, double vision and pain.   Respiratory: Positive for cough and wheezing. Negative for hemoptysis, sputum production and shortness of breath.    Cardiovascular: Negative for chest pain, palpitations, orthopnea and leg swelling.   Gastrointestinal: Negative for abdominal pain, diarrhea, nausea and vomiting.   Musculoskeletal: Negative for joint pain and neck pain.   Neurological: Positive for headaches. Negative for dizziness and weakness.        Objective:     Blood pressure 128/79, pulse 98, temperature 36.2 °C (97.2 °F), resp. rate 16, height 1.727 m (5' 8\"), weight 116.4 kg (256 lb 9.6 oz), SpO2 96 %. Body mass index is 39.02 kg/m².     Physical Exam   Constitutional: He is oriented to person, place, and time and well-developed, well-nourished, and in no distress. No distress.   HENT:   Head: Normocephalic and atraumatic.   Right Ear: Tympanic membrane is bulging. Tympanic " membrane is not erythematous.   Left Ear: Tympanic membrane is bulging. Tympanic membrane is not erythematous.   Nose: Mucosal edema present.   Mouth/Throat: Posterior oropharyngeal erythema present. No oropharyngeal exudate.   Eyes: Conjunctivae and EOM are normal. Pupils are equal, round, and reactive to light.   Neck: Normal range of motion. Neck supple.   Cardiovascular: Normal rate, regular rhythm, normal heart sounds and intact distal pulses.  Exam reveals no friction rub.    No murmur heard.  Pulmonary/Chest: Effort normal and breath sounds normal. No respiratory distress. He has no wheezes. He has no rales. He exhibits no tenderness.   Musculoskeletal: Normal range of motion.   Neurological: He is alert and oriented to person, place, and time. Gait normal.   Skin: Skin is warm and dry.   Psychiatric: Mood, memory, affect and judgment normal.       Assessment and Plan:   The following treatment plan was discussed    1. Acute bacterial sinusitis  Order for antibiotics x 7 days. Advised to use flonase as needed for congestion/inflammation and continue taking Tylenol or ibuprofen as needed for pain and/or fever.  - amoxicillin-clavulanate (AUGMENTIN) 875-125 MG Tab; Take 1 Tab by mouth 2 times a day for 7 days.  Dispense: 14 Tab; Refill: 0  - fluticasone (FLONASE) 50 MCG/ACT nasal spray; Spray 1-2 Sprays in nose 2 times a day as needed.  Dispense: 1 Bottle; Refill: 3      Followup: Return if symptoms worsen or fail to improve.

## 2018-01-17 ENCOUNTER — OFFICE VISIT (OUTPATIENT)
Dept: CARDIOLOGY | Facility: MEDICAL CENTER | Age: 53
End: 2018-01-17
Payer: MEDICARE

## 2018-01-17 VITALS
BODY MASS INDEX: 37.59 KG/M2 | HEART RATE: 68 BPM | DIASTOLIC BLOOD PRESSURE: 62 MMHG | OXYGEN SATURATION: 93 % | WEIGHT: 248 LBS | SYSTOLIC BLOOD PRESSURE: 102 MMHG | HEIGHT: 68 IN

## 2018-01-17 DIAGNOSIS — I25.10 CORONARY ARTERY DISEASE INVOLVING NATIVE CORONARY ARTERY OF NATIVE HEART WITHOUT ANGINA PECTORIS: ICD-10-CM

## 2018-01-17 DIAGNOSIS — I10 ESSENTIAL HYPERTENSION: ICD-10-CM

## 2018-01-17 DIAGNOSIS — E78.2 MIXED HYPERLIPIDEMIA: ICD-10-CM

## 2018-01-17 PROCEDURE — 99214 OFFICE O/P EST MOD 30 MIN: CPT | Performed by: INTERNAL MEDICINE

## 2018-01-17 RX ORDER — LISINOPRIL 2.5 MG/1
2.5 TABLET ORAL DAILY
Qty: 30 TAB | Refills: 11 | Status: SHIPPED | OUTPATIENT
Start: 2018-01-17 | End: 2018-07-11 | Stop reason: SDUPTHER

## 2018-01-17 RX ORDER — METOPROLOL SUCCINATE 25 MG/1
12.5 TABLET, EXTENDED RELEASE ORAL DAILY
Qty: 30 TAB | Refills: 11 | Status: SHIPPED | OUTPATIENT
Start: 2018-01-17 | End: 2018-07-11 | Stop reason: SDUPTHER

## 2018-01-17 RX ORDER — ASPIRIN 81 MG/1
81 TABLET, CHEWABLE ORAL DAILY
COMMUNITY
End: 2022-06-16

## 2018-01-17 RX ORDER — ROSUVASTATIN CALCIUM 20 MG/1
20 TABLET, COATED ORAL EVERY EVENING
Qty: 30 TAB | Refills: 11 | Status: SHIPPED | OUTPATIENT
Start: 2018-01-17 | End: 2018-07-11 | Stop reason: SDUPTHER

## 2018-01-17 ASSESSMENT — ENCOUNTER SYMPTOMS
PALPITATIONS: 0
DIARRHEA: 1
HEADACHES: 1
SHORTNESS OF BREATH: 0
ABDOMINAL PAIN: 0
LOSS OF CONSCIOUSNESS: 0
MYALGIAS: 1
CHILLS: 0
DOUBLE VISION: 0
FEVER: 0
NERVOUS/ANXIOUS: 0
DIZZINESS: 0
NAUSEA: 1
BLURRED VISION: 0
BLOOD IN STOOL: 0
DEPRESSION: 1

## 2018-01-17 NOTE — LETTER
"     [facility]   [address]   [address]  [place] [address]  Phone: [phone]  Fax: [phone]              Deo Beard  1965    Encounter Date: 1/17/2018    Edna Velasco M.D.          PROGRESS NOTE:  Subjective:   Deo Beard is a 52 y.o. male with know h/o  1. CAD-nuclear stress test from 2014 showed Mixed findings of fixed decreased perfusion and a reversible stress induced ischemia seen in inferolateral distribution.  Never had angiogram  2. Ischemic cardiomyopathy  3. History of morbid obesity status post gastric bypass surgery lost approximately 150 lb  4. Sleep apnea on BiPAP  5. Hypertension  6. Dyslipidemia  Presenting today for follow-up evaluation of CAD.    Patient on an average walks about one and half miles a day. He does have stable angina for past 2 years. Denied any complaints of palpitations orthopnea or PND. No complaints of dizziness lightheadedness or LOC. Denied any complaints of lower extremity edema or claudication.    Past Medical History:   Diagnosis Date   • Abnormal EKG 12/27/2012   • Abnormal EKG 12/27/2012   • Bronchitis    • CAD (coronary artery disease) 2008   • Depression    • Diabetes (CMS-[facility])    • HTN (hypertension)    • Hyperlipidemia    • Hypoxia 12/27/2012   • Ischemic cardiomyopathy    • Leukocytosis (leucocytosis) 12/27/2012   • Medically noncompliant 7/18/2013   • MI (myocardial infarction)     Silent MI , ( old), noted on EKG in 2008   • Migraine    • Personal history of venous thrombosis and embolism     leg \"years ago\"   • Pneumonia     5 months ago   • Polysubstance abuse 7/18/2013   • Stephen [place] spotted fever     June 2017   • Sleep apnea, obstructive     CPAP 2010   • Substance abuse     2007; Meth   • Tobacco abuse 7/18/2013     Past Surgical History:   Procedure Laterality Date   • GASTRIC BANDING LAPAROSCOPIC  2015   • PB INCIS/DRAIN FOREARM DEEP ABSCESS  1996   " left AC due to IV drug abuse   • WRIST ORIF      left wrist     Family History   Problem Relation Age of Onset   • No Known Problems Mother    • Heart Disease Father      CHF   • Stroke Father    • Heart Attack Father    • Cancer Brother      Colon Cancer   • Arthritis Maternal Grandmother    • Arthritis Maternal Grandfather    • Arthritis Paternal Grandmother    • Cancer Paternal Grandfather    • Heart Disease Paternal Grandfather      History   Smoking Status   • Former Smoker   • Packs/day: 2.50   • Years: 20.00   • Types: Cigarettes   • Quit date: 9/26/2012   Smokeless Tobacco   • Never Used     No Known Allergies  Outpatient Encounter Prescriptions as of 1/17/2018   Medication Sig Dispense Refill   • fluticasone (FLONASE) 50 MCG/ACT nasal spray Spray 1-2 Sprays in nose 2 times a day as needed. 1 Bottle 3   • lisinopril (PRINIVIL) 10 MG Tab Take 0.5 Tabs by mouth every day. 90 Tab 3   • nitroglycerin (NITROSTAT) 0.4 MG SL Tab Place 1 Tab under tongue as needed for Chest Pain. 25 Tab 0   • duloxetine (CYMBALTA) 60 MG Cap DR Particles delayed-release capsule Take 1 Cap by mouth every day. 90 Cap 0   • pravastatin (PRAVACHOL) 20 MG Tab Take 1 Tab by mouth every day. 90 Tab 0   • ibuprofen (MOTRIN) 600 MG Tab Take 1 Tab by mouth every 6 hours as needed. 30 Tab 0     No facility-administered encounter medications on file as of 1/17/2018.      Review of Systems   Constitutional: Negative for chills and fever.   HENT: Positive for congestion. Negative for hearing loss and tinnitus.         Head ache    Eyes: Negative for blurred vision and double vision.   Respiratory: Negative for shortness of breath.    Cardiovascular: Positive for chest pain. Negative for palpitations and leg swelling.        Chest tightness   Gastrointestinal: Positive for diarrhea and nausea. Negative for abdominal pain, blood in stool and melena.   Genitourinary: Negative for dysuria and hematuria.   Musculoskeletal: Positive for joint pain and  "myalgias.   Skin: Negative for rash.   Neurological: Positive for headaches. Negative for dizziness and loss of consciousness.   Psychiatric/Behavioral: Positive for depression. The patient is not nervous/anxious.         Objective:   /62   Pulse 68   Ht 1.727 m (5' 8\")   Wt 112.5 kg (248 lb)   SpO2 93%   BMI 37.71 kg/m²      Physical Exam   Constitutional: He is oriented to person, place, and time. He appears well-developed and well-nourished. No distress.   HENT:   Head: Normocephalic and atraumatic.   Mouth/Throat: No oropharyngeal exudate.   Eyes: Pupils are equal, round, and reactive to light. Right eye exhibits no discharge. Left eye exhibits no discharge.   Neck: No JVD (difficult to assess) present. No tracheal deviation present.   Cardiovascular: Normal rate and regular rhythm.    No murmur heard.  Pulmonary/Chest: Effort normal and breath sounds normal. No respiratory distress. He has no wheezes.   Abdominal: Soft. Bowel sounds are normal. He exhibits no distension. There is no tenderness.   Musculoskeletal: Normal range of motion. He exhibits no edema.   Neurological: He is alert and oriented to person, place, and time. No cranial nerve deficit.   Skin: Skin is warm and dry. He is not diaphoretic. No erythema.   Psychiatric: He has a normal mood and affect. His behavior is normal.   Results for SEBASTIÁN JANELLE KING (MRN [id number]) as of 2018 14:02   10/31/2017    Sodium 137   Potassium 3.9   Chloride 102   Co2 27   Anion Gap 8.0   Glucose 86   Bun 12   Creatinine 0.56   GFR If Non African American >60   Calcium 9.2   AST(SGOT) 15   ALT(SGPT) 7   Alkaline Phosphatase 83   Glycohemoglobin 5.2   Estim. Avg Glu 103   Cholesterol,Tot 160   Triglycerides 83   HDL 42    (H)       EK17  EKG personally reviewed by me.  Sinus rhythm right bundle branch block inferolateral Q waves    Nuclear stress test: 14  LVEF 40%. Mixed findings of fixed decreased perfusion and a reversible " stress induced ischemia seen in inferolateral distribution.    Echo: 6/27/14   LVEF 50% mild MR trace TR  Assessment:     1. Hypertension  2. CAD  3. Ischemic cardiomyopathy  4. Dyslipidemia    Medical Decision Making:  Today's Assessment / Status / Plan:     1. Decrease lisinopril to 2.5 mg daily.  2. Continue aspirin 81 mg daily  Start Toprol-XL 12.5 mg daily.  3. Echo and nuclear stress test prior to next visit.  4. Advised patient to increase pravastatin to 40 mg qhs, once he finishes pravastatin start Crestor 20 mg qHs to target LDL less than 70.    Follow-up in 4-6 weeks  Nuclear stress test echo prior to next visit.  Labs in 3 months.    Thank you for allowing me to participate in taking care of patient.    Edna Velasco. MD.      Nadira Prasad, A.P.R.N.  [address] [facility] [address]  VIA In Basket

## 2018-01-19 ENCOUNTER — TELEPHONE (OUTPATIENT)
Dept: CARDIOLOGY | Facility: MEDICAL CENTER | Age: 53
End: 2018-01-19

## 2018-01-19 NOTE — TELEPHONE ENCOUNTER
Order for Nuc Stress Test was faxed to [facility] per patient's request. [facility] will contact the patient to schedule.

## 2018-02-01 ENCOUNTER — PATIENT MESSAGE (OUTPATIENT)
Dept: MEDICAL GROUP | Facility: PHYSICIAN GROUP | Age: 53
End: 2018-02-01

## 2018-02-01 ENCOUNTER — TELEPHONE (OUTPATIENT)
Dept: MEDICAL GROUP | Facility: PHYSICIAN GROUP | Age: 53
End: 2018-02-01

## 2018-02-01 DIAGNOSIS — I25.10 CORONARY ARTERY DISEASE INVOLVING NATIVE CORONARY ARTERY OF NATIVE HEART WITHOUT ANGINA PECTORIS: ICD-10-CM

## 2018-02-01 NOTE — TELEPHONE ENCOUNTER
"Called the pt to advise of new referral. When asking pt where he would like it sent due to him not wanting the doctor being in [place], he stated \"thats your job to find me a new one\". I stated \"then [facility] will contact you with whatever is available\". Told him to let us know if he needed anything else and then disconnected.    I called [facility] Cardiology to advise them of the complication, left a message on Darlenes voicemail. Advising the pt got a new referral due to being unsatisfied and that he will want this ASAP.   "

## 2018-02-01 NOTE — TELEPHONE ENCOUNTER
Pt called and was very upset . The pt stated he just fired his cardiologist. He will not drive to [place] for another one, and he wants on here in [place]. He wants a new referral and will fire this practice if he doesn't have an answer by tonight. He stated he will call in the next 30mins and will expect an referral to be placed. I stated I understood.

## 2018-02-02 ENCOUNTER — TELEPHONE (OUTPATIENT)
Dept: CARDIOLOGY | Facility: MEDICAL CENTER | Age: 53
End: 2018-02-02

## 2018-02-02 NOTE — TELEPHONE ENCOUNTER
"Patient Deo Beard's Cardiology appointment with Dr. Velasco was moved from Feb 28th to Feb 16th; due to Dr. Velasco taking a leave of absence starting Feb 16th. Moon Saha MA.TRESSA called and left a message about the appointment day change. The patient called the [facility] main office back and I recieved a message from Venessa asking me to call the patient back. I called the patient on Friday 02/02 at noon. When I spoke to him he called me a \"stupid bitch that was suppose to call him back yesterday.\" I told him that I got the message late and that is why I was calling back now. He conitnued to call me a dumb bitch. He said it was unacceptable for us to reschedule his fucking appointment without calling him first and that he has already established with [facility]. I asked him to please stop cussing at me and that I was calling to apologize, explain and resolve the sitatuation. He then said \"you can kiss my ass you dumb stupid bitch\" and hung up on me.   "

## 2018-02-02 NOTE — TELEPHONE ENCOUNTER
"Please see previous telephone encounter. Spoke to patient. He has established with [facility].     ----- Message from Moon Saha [facility] sent at 2/1/2018  3:54 PM PST -----  Regarding: Please see patient email under \"Encounters\"  Ashley, could you please read the [employer] patient email in patient's chart dated 2/1 please?  I sent it to Julia but I think you should take a look at it as well; I believe you are familiar with this gentleman.    "

## 2018-03-01 ENCOUNTER — OFFICE VISIT (OUTPATIENT)
Dept: MEDICAL GROUP | Facility: PHYSICIAN GROUP | Age: 53
End: 2018-03-01
Payer: MEDICARE

## 2018-03-01 ENCOUNTER — HOSPITAL ENCOUNTER (OUTPATIENT)
Facility: MEDICAL CENTER | Age: 53
End: 2018-03-01
Attending: NURSE PRACTITIONER
Payer: MEDICARE

## 2018-03-01 VITALS
RESPIRATION RATE: 16 BRPM | BODY MASS INDEX: 36.45 KG/M2 | WEIGHT: 240.5 LBS | HEIGHT: 68 IN | DIASTOLIC BLOOD PRESSURE: 70 MMHG | HEART RATE: 89 BPM | SYSTOLIC BLOOD PRESSURE: 110 MMHG | TEMPERATURE: 97.3 F | OXYGEN SATURATION: 95 %

## 2018-03-01 DIAGNOSIS — J06.9 VIRAL URI WITH COUGH: ICD-10-CM

## 2018-03-01 LAB
FLUAV+FLUBV AG SPEC QL IA: NEGATIVE
INT CON NEG: NEGATIVE
INT CON NEG: NEGATIVE
INT CON POS: POSITIVE
INT CON POS: POSITIVE
S PYO AG THROAT QL: NEGATIVE

## 2018-03-01 PROCEDURE — 87804 INFLUENZA ASSAY W/OPTIC: CPT | Performed by: NURSE PRACTITIONER

## 2018-03-01 PROCEDURE — 99213 OFFICE O/P EST LOW 20 MIN: CPT | Performed by: NURSE PRACTITIONER

## 2018-03-01 PROCEDURE — 87880 STREP A ASSAY W/OPTIC: CPT | Performed by: NURSE PRACTITIONER

## 2018-03-01 PROCEDURE — 87070 CULTURE OTHR SPECIMN AEROBIC: CPT

## 2018-03-01 ASSESSMENT — ENCOUNTER SYMPTOMS
FOCAL WEAKNESS: 0
DIZZINESS: 0
SINUS PAIN: 0
SORE THROAT: 1
SPUTUM PRODUCTION: 0
SHORTNESS OF BREATH: 0
CHILLS: 1
SENSORY CHANGE: 0
BLURRED VISION: 0
VOMITING: 0
ABDOMINAL PAIN: 0
COUGH: 1
FEVER: 0
WEAKNESS: 0
NAUSEA: 1
DIAPHORESIS: 0
HEADACHES: 1
DIARRHEA: 0
WHEEZING: 0
PALPITATIONS: 0

## 2018-03-01 NOTE — PROGRESS NOTES
Subjective:   Deo Beard is a 52 y.o. male here today for cold-like symptoms.     URI    This is a new problem. Episode onset: 3 days ago. The problem has been unchanged. There has been no fever. Associated symptoms include congestion, coughing, headaches, joint pain, nausea and a sore throat. Pertinent negatives include no abdominal pain, chest pain, diarrhea, dysuria, ear pain, sinus pain, vomiting or wheezing. Treatments tried: Tylenol, cough drops, and cough syrup. The treatment provided no relief.     Current medicines (including changes today)  Current Outpatient Prescriptions   Medication Sig Dispense Refill   • fluticasone (FLONASE) 50 MCG/ACT nasal spray Spray 1-2 Sprays in nose 2 times a day as needed. 1 Bottle 3   • nitroglycerin (NITROSTAT) 0.4 MG SL Tab Place 1 Tab under tongue as needed for Chest Pain. 25 Tab 0   • duloxetine (CYMBALTA) 60 MG Cap DR Particles delayed-release capsule Take 1 Cap by mouth every day. 90 Cap 0   • aspirin (ASA) 81 MG Chew Tab chewable tablet Take 81 mg by mouth every day.     • metoprolol SR (TOPROL XL) 25 MG TABLET SR 24 HR Take 0.5 Tabs by mouth every day. 30 Tab 11   • lisinopril (PRINIVIL) 2.5 MG Tab Take 1 Tab by mouth every day. 30 Tab 11   • rosuvastatin (CRESTOR) 20 MG Tab Take 1 Tab by mouth every evening. 30 Tab 11   • ibuprofen (MOTRIN) 600 MG Tab Take 1 Tab by mouth every 6 hours as needed. 30 Tab 0     No current facility-administered medications for this visit.      He  has a past medical history of Abnormal EKG (12/27/2012); Abnormal EKG (12/27/2012); Bronchitis; CAD (coronary artery disease) (2008); Depression; Diabetes (CMS-[facility]); HTN (hypertension); Hyperlipidemia; Hypoxia (12/27/2012); Ischemic cardiomyopathy; Leukocytosis (leucocytosis) (12/27/2012); Medically noncompliant (7/18/2013); MI (myocardial infarction); Migraine; Personal history of venous thrombosis and embolism; Pneumonia; Polysubstance abuse (7/18/2013); Stephen [place] spotted fever;  "Sleep apnea, obstructive; Substance abuse; and Tobacco abuse (7/18/2013).    Social History     Social History   • Marital status: Single     Spouse name: N/A   • Number of children: N/A   • Years of education: N/A     Occupational History   • Not on file.     Social History Main Topics   • Smoking status: Former Smoker     Packs/day: 2.50     Years: 20.00     Types: Cigarettes     Quit date: 9/26/2012   • Smokeless tobacco: Never Used   • Alcohol use No   • Drug use: Yes     Types: Intravenous, Injected (Skin Popping), Marijuana      Comment: occasionally   • Sexual activity: No     Other Topics Concern   • Not on file     Social History Narrative   • No narrative on file       Review of Systems   Constitutional: Positive for chills and malaise/fatigue. Negative for diaphoresis and fever.   HENT: Positive for congestion and sore throat. Negative for ear pain and sinus pain.    Eyes: Negative for blurred vision.   Respiratory: Positive for cough. Negative for sputum production, shortness of breath and wheezing.    Cardiovascular: Negative for chest pain and palpitations.   Gastrointestinal: Positive for nausea. Negative for abdominal pain, diarrhea and vomiting.   Genitourinary: Negative for dysuria.   Musculoskeletal: Positive for joint pain.   Neurological: Positive for headaches. Negative for dizziness, sensory change, focal weakness and weakness.        Objective:     Blood pressure 110/70, pulse 89, temperature 36.3 °C (97.3 °F), resp. rate 16, height 1.727 m (5' 8\"), weight 109.1 kg (240 lb 8 oz), SpO2 95 %. Body mass index is 36.57 kg/m².     Physical Exam:  Constitutional: Oriented to person, place, and time and well-developed, well-nourished, and in no distress.   HENT:   Head: Normocephalic and atraumatic.   Right Ear: Tympanic membrane and external ear normal.   Left Ear: Tympanic membrane and external ear normal.   Mouth/Throat: Oropharynx is clear and moist and mucous membranes are normal. No " oropharyngeal exudate. Posterior oropharyngeal erythema and swelling noted.   Eyes: Conjunctivae and EOM are normal. Pupils are equal, round, and reactive to light.   Neck: Normal range of motion. Neck supple.   Cardiovascular: Normal rate, regular rhythm, normal heart sounds. Radial and pedal pulses intact. Exam reveals no friction rub. No murmur heard.  Pulmonary/Chest: Effort normal and breath sounds normal. No respiratory distress or use of accessory muscles. No wheezes, rhonchi, or rales.   Abdominal: Soft. Bowel sounds are normal. Exhibits no distension and no mass. There is no tenderness. No hepatosplenomegaly.    Musculoskeletal: Full range of motion. No deformity or swelling of joints. Lymphadenopathy:     Cervical adenopathy present.   Neurological: Alert and oriented to person, place, and time. Gait normal.   Skin: Skin is warm and dry. No cyanosis. No edema.  Psychiatric: Mood, memory, affect and judgment normal.       Assessment and Plan:   The following treatment plan was discussed    1. Viral URI with cough  Rapid strep and influenza negative. Will send throat specimen for culture. Symptoms most likely viral in nature. Explained that this is a self-limiting illness  usually lasting 7-10 days. Recommend symptomatic treatment, including tylenol or ibuprofen for pain/fever, decongestant, saline nasal spray, vitamin C, and humidifier at bedside. Increase fluids and get plenty of rest.   - POCT Rapid Strep A  - CULTURE THROAT; Future  - POCT Influenza A/B    Followup: Return if symptoms worsen or fail to improve.

## 2018-03-04 LAB
BACTERIA SPEC RESP CULT: NORMAL
SIGNIFICANT IND 70042: NORMAL
SITE SITE: NORMAL
SOURCE SOURCE: NORMAL

## 2018-03-06 ENCOUNTER — OFFICE VISIT (OUTPATIENT)
Dept: NEUROLOGY | Facility: MEDICAL CENTER | Age: 53
End: 2018-03-06
Payer: MEDICARE

## 2018-03-06 VITALS
HEIGHT: 68 IN | TEMPERATURE: 97.9 F | HEART RATE: 76 BPM | WEIGHT: 242.51 LBS | SYSTOLIC BLOOD PRESSURE: 132 MMHG | OXYGEN SATURATION: 96 % | DIASTOLIC BLOOD PRESSURE: 86 MMHG | BODY MASS INDEX: 36.75 KG/M2

## 2018-03-06 DIAGNOSIS — G44.311 INTRACTABLE ACUTE POST-TRAUMATIC HEADACHE: ICD-10-CM

## 2018-03-06 DIAGNOSIS — R46.89 SPELL OF ABNORMAL BEHAVIOR: ICD-10-CM

## 2018-03-06 DIAGNOSIS — G44.321 INTRACTABLE CHRONIC POST-TRAUMATIC HEADACHE: ICD-10-CM

## 2018-03-06 PROBLEM — G44.309 POST-TRAUMATIC HEADACHE: Status: ACTIVE | Noted: 2018-03-06

## 2018-03-06 PROCEDURE — 99204 OFFICE O/P NEW MOD 45 MIN: CPT | Performed by: PHYSICIAN ASSISTANT

## 2018-03-06 RX ORDER — TOPIRAMATE 25 MG/1
25 TABLET ORAL 2 TIMES DAILY
Qty: 60 TAB | Refills: 6 | Status: SHIPPED | OUTPATIENT
Start: 2018-03-06 | End: 2022-06-16

## 2018-03-06 RX ORDER — DICLOFENAC SODIUM 75 MG/1
75 TABLET, DELAYED RELEASE ORAL DAILY
Qty: 8 TAB | Refills: 6 | Status: SHIPPED | OUTPATIENT
Start: 2018-03-06 | End: 2022-06-16

## 2018-03-06 NOTE — PROGRESS NOTES
Subjective:      Deo Beard is a 52 y.o. male who presents with New Patient (migraines)    Chief Complaint/Reason for referral:  headaches    History of Present Illness:   Describe your headaches to me: Patient had [place] Spotted Fever in [place] and was hospitalized for a month.      Headaches with light sensitivity began June 2017 after this hospitalization.  Typically are in forehead.  Last more than 4 hours untreated, sensitive to light, stabbing, moderate to severe intensity.  Get worse or are brought on after he is in the sunlight or using a computer.  Remote hx of IV drug use.    Takes some tylenol and it helps some.  - Taking tylenol 2-3 times weekly.    Do you get an aura or any symptoms that typically begin PRIOR to headache onset?  Light sensitivity and computer use cause him to feel strangely and then head pain begins around the eyes    Are you nauseated or sick to your stomach when you have a headache?  y  Does light bother you when you have a headache?   ____y_____  Does sound or noise bother you when you have a headache?   ____n_____    Neurologic symptoms with headaches (weakness, numbness, vertigo, speech changes, cognitive changes)  none      Do you have any neck or jaw pain with headaches?    No hx of neck injuries    No jaw issues      Have you identified any triggers for your headaches (dehydration, poor sleep, low blood sugar, alcohol 35% (robson wine) chocolate 22%, cheese 9%, citrus fruit 11%)? Sunlight, computer    Do you feel restless like you want to pace around with your headaches or do you feel like lying down to make your headache feel better/less severe?  Lie down    Do headaches start by coughing, sneezing, bending over, Valsalva maneuver, sexual activity? Yes with exertion headache has come on    Do headaches start shortly after you lie down to go to bed or shortly after you get up from bed in the morning?  random    Have you noticed a menstrual pattern to your  headaches? n/a    Have you ever kept a headache diary?    How many days do you keep ANY type of headache in any given month?  3 or fewer days______   Between 3 and 6 days______   Between 6 and 10 days_____  Between 11 and 14 days____  15 or more headache/days per month_X____  Has severe headaches _16___ days per month    Family members with headaches:  none    Co--morbid conditions:    Conditions that affect diagnosis and treatment:  Depression  N        Anxiety     N        Sleep disorders:   Yes - sleep apnea has machine and he uses it      Obesity  Yes BMI 37.  He has had gastric sleeve and has lost 200 pounds    History of TBI Yes concussion when he was younger            Pregnancy/family planning   N/a    Fibromyalgia   N    Social History:  Do you drink any caffeine? Yes__X___ No____   How many days per week?  2 or fewer days______  3 or more days__X____  Typically has coffee in AM and 2 sodas    Do you drink alcohol? Socially     Do you use recreational drugs including medicinal marijuana? not    Do you smoke cigarettes? Quit 8 years ago     What do you do for work? Disabled due to hx of heart attack    How do your headaches affect your ability to work? N/a    Who and where do you live? [place]    What is your exercise program: no.    Have you had an MRI done?  In [place] and it was nothing significant related to headaches    PMH reviewed -  Take a beta blocker now, had a MI, no hx of stroke, no hx of kidney stones, takes cymbalta for nerve pain    Medications and Allergies Reviewed     What are you taking right now for your headaches/#days per month of acute medication use:     Tylenol 3-4 pills 2 twice weekly        Prior acute treatments:  Medication/dose/timing/route/worked or side-effects?    ibuprofen    What are you taking right now - daily - to prevent headaches?/dose    Metoprolol - for heart as well as migraine  Never tried depakote or topamax  Currently on beta blocker  Currently on  "anti-depressant      Any prior prophylactic treatments:  Medications/dose/frequency/duration of treatment/worked or side effects?    none                                                                                                               HPI    ROS       Objective:     /86   Pulse 76   Temp 36.6 °C (97.9 °F)   Ht 1.727 m (5' 7.99\")   Wt 110 kg (242 lb 8.1 oz)   SpO2 96%   BMI 36.88 kg/m²      Physical Exam    Well developed, well nourished - vital signs reviewed  Alert and Oriented x 3, Affect Appropriate, Fund of knowledge within normal limits, Memory intact  Cranial Nerves: PERRL, EOMI without nystagmus or opthalmoplegia,  face symmetric in strength and sensation, no facial droop, tongue midline without atrophy or fasiculations, shoulder shrug is normal bilaterally, speech clear and fluent no aphasia  Motor:  Upper and lower extremities 5/5, equal bilaterally.  No drift.  Sensation: Light touch equal and intact in all extremities, No sensory deficits  Cerebellar:  Finger to nose intact.  No dysmetria.  No tremor.  Gait: normal gait without ataxia.    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  He had an EEG in June when he was hospitalized but not since.  I will order this and they will call you to schedule    Total time with this visit:  40   Minutes face-to-face with patient. More than 50% of this visit was spent educating " patient on their illness and/or coordinating care, as detailed above

## 2018-03-06 NOTE — PATIENT INSTRUCTIONS
Acute treatment for headache:    Either:  cbd spray  Diclofenac  Tylenol   (all three can be used the same day also)    Prevention:  topamax titration:  25 mg PM x 2 week;  Then 1  AM and 1 PM x 2 week;  Then 1 tab AM and 2 tabs PM x 2 weeks;   then 2 pills AM and 2 pills PM;     Do not increase titration further if you have no dull headaches week prior or only 1 migraine that responded to treatment.    Occipital nerve block shots    Handout on botox to read    vitamins

## 2018-03-13 ENCOUNTER — OFFICE VISIT (OUTPATIENT)
Dept: NEUROLOGY | Facility: MEDICAL CENTER | Age: 53
End: 2018-03-13
Payer: MEDICARE

## 2018-03-13 VITALS
HEART RATE: 75 BPM | RESPIRATION RATE: 16 BRPM | TEMPERATURE: 97.1 F | WEIGHT: 240.96 LBS | OXYGEN SATURATION: 96 % | DIASTOLIC BLOOD PRESSURE: 80 MMHG | SYSTOLIC BLOOD PRESSURE: 140 MMHG | HEIGHT: 67 IN | BODY MASS INDEX: 37.82 KG/M2

## 2018-03-13 DIAGNOSIS — G44.321 INTRACTABLE CHRONIC POST-TRAUMATIC HEADACHE: ICD-10-CM

## 2018-03-13 PROCEDURE — 64400 NJX AA&/STRD TRIGEMINAL NRV: CPT | Mod: 50,59 | Performed by: PHYSICIAN ASSISTANT

## 2018-03-13 PROCEDURE — 64450 NJX AA&/STRD OTHER PN/BRANCH: CPT | Mod: 50,59 | Performed by: PHYSICIAN ASSISTANT

## 2018-03-13 PROCEDURE — S0020 INJECTION, BUPIVICAINE HYDRO: HCPCS | Performed by: PHYSICIAN ASSISTANT

## 2018-03-13 PROCEDURE — 64600 INJECTION TREATMENT OF NERVE: CPT | Performed by: PHYSICIAN ASSISTANT

## 2018-03-13 PROCEDURE — 64405 NJX AA&/STRD GR OCPL NRV: CPT | Mod: 50,59 | Performed by: PHYSICIAN ASSISTANT

## 2018-03-13 RX ORDER — KETOROLAC TROMETHAMINE 30 MG/ML
60 INJECTION, SOLUTION INTRAMUSCULAR; INTRAVENOUS ONCE
Status: COMPLETED | OUTPATIENT
Start: 2018-03-13 | End: 2018-03-13

## 2018-03-13 RX ORDER — TRIAMCINOLONE ACETONIDE 40 MG/ML
80 INJECTION, SUSPENSION INTRA-ARTICULAR; INTRAMUSCULAR ONCE
Status: COMPLETED | OUTPATIENT
Start: 2018-03-13 | End: 2018-03-13

## 2018-03-13 RX ORDER — BUPIVACAINE HYDROCHLORIDE 5 MG/ML
22 INJECTION, SOLUTION EPIDURAL; INTRACAUDAL ONCE
Status: COMPLETED | OUTPATIENT
Start: 2018-03-13 | End: 2018-03-13

## 2018-03-13 RX ADMIN — BUPIVACAINE HYDROCHLORIDE 22 ML: 5 INJECTION, SOLUTION EPIDURAL; INTRACAUDAL at 16:14

## 2018-03-13 RX ADMIN — TRIAMCINOLONE ACETONIDE 80 MG: 40 INJECTION, SUSPENSION INTRA-ARTICULAR; INTRAMUSCULAR at 16:17

## 2018-03-13 RX ADMIN — KETOROLAC TROMETHAMINE 60 MG: 30 INJECTION, SOLUTION INTRAMUSCULAR; INTRAVENOUS at 16:28

## 2018-04-09 ENCOUNTER — APPOINTMENT (OUTPATIENT)
Dept: NEUROLOGY | Facility: MEDICAL CENTER | Age: 53
End: 2018-04-09
Payer: MEDICARE

## 2018-06-19 ENCOUNTER — TELEPHONE (OUTPATIENT)
Dept: MEDICAL GROUP | Facility: PHYSICIAN GROUP | Age: 53
End: 2018-06-19

## 2018-06-19 NOTE — TELEPHONE ENCOUNTER
Pt called and left voicemail. I called pt back, he is needing his last note sent to Social security for the renewal.     Fax: [phone], Noni Blank

## 2018-06-20 NOTE — TELEPHONE ENCOUNTER
Per pt, he unable to come in for an appt due to  Being out of town until July 10th. He states he has communicated with Noni in the SS office, they will be contacting us with paperwork.     I advised the pt that we will check the voicemails to verify if they have called and left an message.

## 2018-06-21 NOTE — TELEPHONE ENCOUNTER
Spoke to pt, advised of the needed appt due to Nadira never filling out this paperwork for him. He advised me that this is ridiculous. He can't understand why we can't fax the paperwork that was requested. I then advised him that since Nadira has never filled out this paperwork for him, and unsure of what the reasoning are for this. Appt was made.     The pt gave me the below numbers to get olegario of the Social security office, Noni to advise her of the paperwork being unable to be completed. I faxed an coverpage with details regarding this being uncompleted until 07/11/18, and advised her to call with any questions.    [phone]

## 2018-07-11 ENCOUNTER — OFFICE VISIT (OUTPATIENT)
Dept: MEDICAL GROUP | Facility: PHYSICIAN GROUP | Age: 53
End: 2018-07-11
Payer: MEDICARE

## 2018-07-11 ENCOUNTER — TELEPHONE (OUTPATIENT)
Dept: MEDICAL GROUP | Facility: PHYSICIAN GROUP | Age: 53
End: 2018-07-11

## 2018-07-11 VITALS
WEIGHT: 246.1 LBS | SYSTOLIC BLOOD PRESSURE: 122 MMHG | TEMPERATURE: 97.8 F | HEIGHT: 67 IN | OXYGEN SATURATION: 96 % | DIASTOLIC BLOOD PRESSURE: 78 MMHG | HEART RATE: 65 BPM | RESPIRATION RATE: 16 BRPM | BODY MASS INDEX: 38.63 KG/M2

## 2018-07-11 DIAGNOSIS — E66.9 OBESITY (BMI 30-39.9): ICD-10-CM

## 2018-07-11 DIAGNOSIS — E04.1 CYSTIC THYROID NODULE: ICD-10-CM

## 2018-07-11 DIAGNOSIS — I25.5 ISCHEMIC CARDIOMYOPATHY: ICD-10-CM

## 2018-07-11 DIAGNOSIS — F43.21 SITUATIONAL DEPRESSION: ICD-10-CM

## 2018-07-11 DIAGNOSIS — I10 ESSENTIAL HYPERTENSION: ICD-10-CM

## 2018-07-11 DIAGNOSIS — Z23 NEED FOR TDAP VACCINATION: ICD-10-CM

## 2018-07-11 DIAGNOSIS — I25.10 CORONARY ARTERY DISEASE INVOLVING NATIVE CORONARY ARTERY OF NATIVE HEART WITHOUT ANGINA PECTORIS: ICD-10-CM

## 2018-07-11 PROCEDURE — 90715 TDAP VACCINE 7 YRS/> IM: CPT | Performed by: NURSE PRACTITIONER

## 2018-07-11 PROCEDURE — 99214 OFFICE O/P EST MOD 30 MIN: CPT | Mod: 25 | Performed by: NURSE PRACTITIONER

## 2018-07-11 PROCEDURE — 90471 IMMUNIZATION ADMIN: CPT | Performed by: NURSE PRACTITIONER

## 2018-07-11 RX ORDER — DULOXETIN HYDROCHLORIDE 60 MG/1
60 CAPSULE, DELAYED RELEASE ORAL DAILY
Qty: 90 CAP | Refills: 1 | Status: SHIPPED | OUTPATIENT
Start: 2018-07-11 | End: 2022-06-16

## 2018-07-11 RX ORDER — ROSUVASTATIN CALCIUM 20 MG/1
20 TABLET, COATED ORAL EVERY EVENING
Qty: 90 TAB | Refills: 1 | Status: SHIPPED | OUTPATIENT
Start: 2018-07-11 | End: 2022-06-16

## 2018-07-11 RX ORDER — METOPROLOL SUCCINATE 25 MG/1
25 TABLET, EXTENDED RELEASE ORAL DAILY
Qty: 90 TAB | Refills: 1 | Status: SHIPPED | OUTPATIENT
Start: 2018-07-11 | End: 2022-06-16

## 2018-07-11 RX ORDER — LISINOPRIL 2.5 MG/1
2.5 TABLET ORAL DAILY
Qty: 90 TAB | Refills: 1 | Status: SHIPPED | OUTPATIENT
Start: 2018-07-11 | End: 2022-06-16

## 2018-07-11 ASSESSMENT — ENCOUNTER SYMPTOMS
SHORTNESS OF BREATH: 0
CHILLS: 0
PALPITATIONS: 0
FEVER: 0
BLURRED VISION: 0

## 2018-07-12 NOTE — ASSESSMENT & PLAN NOTE
This is a new problem. Stable nodular/cystic left thyroid mass incidentally found with CT chest by pulmonologist on 3/7/18. Thyroid levels unknown.

## 2018-07-12 NOTE — ASSESSMENT & PLAN NOTE
Chronic in nature.  His symptoms are currently treated with rosuvastatin 20 mg daily along with baby aspirin and nitroglycerin as needed.  He is followed by cardiology.

## 2018-07-12 NOTE — ASSESSMENT & PLAN NOTE
Chronic in nature. His symptoms are currently treated with lisinopril and metoprolol daily. His LVEF was 65% in March 2018. He does have history of CAD with evidence of MI in 2008, along with hypertension. He is also followed by cardiology Dr. Tay. Due to cardiac history, he has been deemed as disabled since 2015.  He is requesting completion of disability paperwork today.

## 2018-07-12 NOTE — PROGRESS NOTES
Subjective:   Deo Beard is a 52 y.o. male here today for the following concerns:    Ischemic cardiomyopathy  Chronic in nature. His symptoms are currently treated with lisinopril and metoprolol daily. His LVEF was 65% in March 2018. He does have history of CAD with evidence of MI in 2008, along with hypertension. He is also followed by cardiology Dr. Tay. Due to cardiac history, he has been deemed as disabled since 2015.  He is requesting completion of disability paperwork today.    Coronary artery disease involving native coronary artery of native heart without angina pectoris  Chronic in nature.  His symptoms are currently treated with rosuvastatin 20 mg daily along with baby aspirin and nitroglycerin as needed.  He is followed by cardiology.    Hypertension  Chronic in nature.  His symptoms are controlled with lisinopril 2.5 mg daily and metoprolol 25 mg.  He denies chest pain, shortness of breath, peripheral edema, or urinary difficulties.  He is followed by [facility] cardiology.     Current medicines (including changes today)  Current Outpatient Prescriptions   Medication Sig Dispense Refill   • topiramate (TOPAMAX) 25 MG Tab Take 1 Tab by mouth 2 times a day. Titrate with schedule provided 60 Tab 6   • diclofenac EC (VOLTAREN) 75 MG Tablet Delayed Response Take 1 Tab by mouth every day. Take once weekly one per day for headache 8 Tab 6   • aspirin (ASA) 81 MG Chew Tab chewable tablet Take 81 mg by mouth every day.     • metoprolol SR (TOPROL XL) 25 MG TABLET SR 24 HR Take 0.5 Tabs by mouth every day. 30 Tab 11   • lisinopril (PRINIVIL) 2.5 MG Tab Take 1 Tab by mouth every day. 30 Tab 11   • rosuvastatin (CRESTOR) 20 MG Tab Take 1 Tab by mouth every evening. 30 Tab 11   • fluticasone (FLONASE) 50 MCG/ACT nasal spray Spray 1-2 Sprays in nose 2 times a day as needed. 1 Bottle 3   • duloxetine (CYMBALTA) 60 MG Cap DR Particles delayed-release capsule Take 1 Cap by mouth every day. 90 Cap 0   •  "ibuprofen (MOTRIN) 600 MG Tab Take 1 Tab by mouth every 6 hours as needed. 30 Tab 0   • nitroglycerin (NITROSTAT) 0.4 MG SL Tab Place 1 Tab under tongue as needed for Chest Pain. 25 Tab 0     No current facility-administered medications for this visit.      He  has a past medical history of Abnormal EKG (12/27/2012); Abnormal EKG (12/27/2012); Bronchitis; CAD (coronary artery disease) (2008); Depression; Diabetes (CMS-HCC) ([facility]); HTN (hypertension); Hyperlipidemia; Hypoxia (12/27/2012); Ischemic cardiomyopathy; Leukocytosis (leucocytosis) (12/27/2012); Medically noncompliant (7/18/2013); MI (myocardial infarction) ([facility]); Migraine; Personal history of venous thrombosis and embolism; Pneumonia; Polysubstance abuse (7/18/2013); [place] spotted fever; Sleep apnea, obstructive; Substance abuse; and Tobacco abuse (7/18/2013).    Social History     Social History   • Marital status: Single     Spouse name: N/A   • Number of children: N/A   • Years of education: N/A     Occupational History   • Not on file.     Social History Main Topics   • Smoking status: Former Smoker     Packs/day: 2.50     Years: 20.00     Types: Cigarettes     Quit date: 9/26/2012   • Smokeless tobacco: Never Used   • Alcohol use No   • Drug use: Yes     Types: Intravenous, Injected (Skin Popping), Marijuana      Comment: occasionally   • Sexual activity: No     Other Topics Concern   • Not on file     Social History Narrative   • No narrative on file       Review of Systems   Constitutional: Negative for chills, fever and malaise/fatigue.   Eyes: Negative for blurred vision.   Respiratory: Negative for shortness of breath.    Cardiovascular: Negative for chest pain, palpitations and leg swelling.      Objective:     Blood pressure 122/78, pulse 65, temperature 36.6 °C (97.8 °F), resp. rate 16, height 1.702 m (5' 7\"), weight 111.6 kg (246 lb 1.6 oz), SpO2 96 %. Body mass index is 38.54 kg/m².     Physical Exam:  Constitutional: Oriented to " person, place, and time and well-developed, well-nourished, and in no distress.   HENT:   Head: Normocephalic and atraumatic.    Eyes: Conjunctivae and EOM are normal. Pupils are equal, round, and reactive to light.   Neck: Normal range of motion. Neck supple.  Cardiovascular: Normal rate, regular rhythm, normal heart sounds. Radial and pedal pulses intact. Exam reveals no friction rub. No murmur heard.  Pulmonary/Chest: Effort normal and breath sounds normal. No respiratory distress or use of accessory muscles. No wheezes, rhonchi, or rales. .    Musculoskeletal: Full range of motion.   Neurological: Alert and oriented to person, place, and time. Gait normal.   Skin: Skin is warm and dry. No cyanosis. No edema.  Psychiatric: Mood, memory, affect and judgment normal.     Assessment and Plan:   The following treatment plan was discussed    1. Cystic thyroid nodule  This is most likely a benign finding.  Will check thyroid levels to determine underlying thyroid disease. Will continue to monitor nodule.  - TSH; Future  - FREE THYROXINE; Future  - TRIIDOTHYRONINE; Future    2. Ischemic cardiomyopathy  Stable, controlled.  Continue medication as prescribed.  He is followed by cardiology.  Review progress notes today.    3. Coronary artery disease involving native coronary artery of native heart without angina pectoris  Stable, controlled.  Continue medications as prescribed.  Reviewed cardiology notes today.    4. Essential hypertension  Stable, controlled.  Continue medications as prescribed.    5. Need for Tdap vaccination  I have placed the below orders and discussed them with an approved delegating provider.  The MA is performing the below orders under the direction of Dr Quintanilla.   - TDAP VACCINE =>6YO IM    6. Obesity (BMI 30-39.9)  - Patient identified as having weight management issue.  Appropriate orders and counseling given.      Followup: No Follow-up on file.

## 2018-07-12 NOTE — TELEPHONE ENCOUNTER
Please notify patient that his medications have been refilled.     I also reviewed progress notes from pulmonologist. CT of chest showed stable cystic left thyroid nodule, which is most likely benign. I suggest that we continue to monitor and follow up with U/S of neck in 6 months - 1 year. I do recommend that we check thyroid levels and r/o any underlying thyroid disease. I have placed lab orders; please complete these at your earliest convenience.    I will be looking out for the disability paperwork.     STANFORD Pryor.,ESTEVANP-C

## 2018-07-12 NOTE — ASSESSMENT & PLAN NOTE
Chronic in nature.  His symptoms are controlled with lisinopril 2.5 mg daily and metoprolol 25 mg.  He denies chest pain, shortness of breath, peripheral edema, or urinary difficulties.  He is followed by [facility] cardiology.

## 2018-08-11 ENCOUNTER — PATIENT OUTREACH (OUTPATIENT)
Dept: HEALTH INFORMATION MANAGEMENT | Facility: OTHER | Age: 53
End: 2018-08-11

## 2018-08-11 NOTE — PROGRESS NOTES
Outcome: Left Message    Please transfer to Patient Outreach Team at [phone] when patient returns call.    WebIZ Checked & Epic Updated:  yes        Attempt # 1

## 2018-09-07 NOTE — PROGRESS NOTES
1. Attempt #:2    2. WebIZ Checked & Epic Updated: Yes  3. HealthConnect Verified: yes  4. Verify PCP: yes    5. Communication Preference Obtained: NO    6. Diabetes Visit Scheduling  Scheduling Status:NA      7. Care Gap Scheduling (Attempt to Schedule EACH Overdue Care Gap!)    Health Maintenance Due   Topic Date Due   • Annual Wellness Visit  1966   • IMM ZOSTER VACCINES (1 of 2) 10/05/2016   • IMM INFLUENZA (1) 09/01/2018        8. Patient was directed to Health and Wellness Website: NO PT HUNG UP BEFORE I COULD REVIEW       9. Screened for [employer] Prescription? no  10. Qubrit Activation: already active  11. Qubrit [place]: no  12. Virtual Visits: no  13. Opt In to Text Messages: no

## 2018-09-10 ENCOUNTER — OFFICE VISIT (OUTPATIENT)
Dept: MEDICAL GROUP | Facility: PHYSICIAN GROUP | Age: 53
End: 2018-09-10
Payer: MEDICARE

## 2018-09-10 ENCOUNTER — HOSPITAL ENCOUNTER (OUTPATIENT)
Dept: LAB | Facility: MEDICAL CENTER | Age: 53
End: 2018-09-10
Attending: NURSE PRACTITIONER
Payer: MEDICARE

## 2018-09-10 VITALS
BODY MASS INDEX: 36.3 KG/M2 | SYSTOLIC BLOOD PRESSURE: 122 MMHG | HEIGHT: 67 IN | WEIGHT: 231.3 LBS | HEART RATE: 80 BPM | OXYGEN SATURATION: 96 % | TEMPERATURE: 98.4 F | DIASTOLIC BLOOD PRESSURE: 78 MMHG | RESPIRATION RATE: 18 BRPM

## 2018-09-10 DIAGNOSIS — Z20.5 EXPOSURE TO HEPATITIS C: ICD-10-CM

## 2018-09-10 DIAGNOSIS — E04.1 CYSTIC THYROID NODULE: ICD-10-CM

## 2018-09-10 LAB
ALBUMIN SERPL BCP-MCNC: 3.8 G/DL (ref 3.2–4.9)
ALBUMIN/GLOB SERPL: 1.1 G/DL
ALP SERPL-CCNC: 77 U/L (ref 30–99)
ALT SERPL-CCNC: 10 U/L (ref 2–50)
ANION GAP SERPL CALC-SCNC: 6 MMOL/L (ref 0–11.9)
AST SERPL-CCNC: 17 U/L (ref 12–45)
BILIRUB SERPL-MCNC: 0.9 MG/DL (ref 0.1–1.5)
BUN SERPL-MCNC: 17 MG/DL (ref 8–22)
CALCIUM SERPL-MCNC: 9.2 MG/DL (ref 8.5–10.5)
CHLORIDE SERPL-SCNC: 105 MMOL/L (ref 96–112)
CO2 SERPL-SCNC: 29 MMOL/L (ref 20–33)
CREAT SERPL-MCNC: 0.7 MG/DL (ref 0.5–1.4)
FASTING STATUS PATIENT QL REPORTED: NORMAL
GLOBULIN SER CALC-MCNC: 3.5 G/DL (ref 1.9–3.5)
GLUCOSE SERPL-MCNC: 97 MG/DL (ref 65–99)
POTASSIUM SERPL-SCNC: 3.1 MMOL/L (ref 3.6–5.5)
PROT SERPL-MCNC: 7.3 G/DL (ref 6–8.2)
SODIUM SERPL-SCNC: 140 MMOL/L (ref 135–145)
T3 SERPL-MCNC: 114.8 NG/DL (ref 60–181)
T4 FREE SERPL-MCNC: 0.89 NG/DL (ref 0.53–1.43)
TSH SERPL DL<=0.005 MIU/L-ACNC: 0.84 UIU/ML (ref 0.38–5.33)

## 2018-09-10 PROCEDURE — 84443 ASSAY THYROID STIM HORMONE: CPT

## 2018-09-10 PROCEDURE — 99212 OFFICE O/P EST SF 10 MIN: CPT | Performed by: NURSE PRACTITIONER

## 2018-09-10 PROCEDURE — 84439 ASSAY OF FREE THYROXINE: CPT

## 2018-09-10 PROCEDURE — 36415 COLL VENOUS BLD VENIPUNCTURE: CPT | Mod: GA

## 2018-09-10 PROCEDURE — 84480 ASSAY TRIIODOTHYRONINE (T3): CPT

## 2018-09-10 PROCEDURE — 80053 COMPREHEN METABOLIC PANEL: CPT

## 2018-09-10 PROCEDURE — 80074 ACUTE HEPATITIS PANEL: CPT | Mod: GA

## 2018-09-10 ASSESSMENT — ENCOUNTER SYMPTOMS
MYALGIAS: 0
SHORTNESS OF BREATH: 0
VOMITING: 0
ABDOMINAL PAIN: 0
DIAPHORESIS: 0
BLOOD IN STOOL: 0
NAUSEA: 0
FEVER: 0
CHILLS: 0

## 2018-09-10 NOTE — PROGRESS NOTES
Subjective:   Deo Beard is a 52 y.o. male here today for c/o exposure to hepatitis C.    Exposure to hepatitis C  Reports that one week ago he was notified from sexual partner that she had hepatitis C. He did have unprotected sex with this partner. He denies abdominal pain, jaundice, or abnormal color urine or stool. He reports history of hepatitis several years ago due to IV drug use (he is unsure of type).     Current medicines (including changes today)  Current Outpatient Prescriptions   Medication Sig Dispense Refill   • rosuvastatin (CRESTOR) 20 MG Tab Take 1 Tab by mouth every evening. 90 Tab 1   • metoprolol SR (TOPROL XL) 25 MG TABLET SR 24 HR Take 1 Tab by mouth every day. 90 Tab 1   • lisinopril (PRINIVIL) 2.5 MG Tab Take 1 Tab by mouth every day. 90 Tab 1   • DULoxetine (CYMBALTA) 60 MG Cap DR Particles delayed-release capsule Take 1 Cap by mouth every day. 90 Cap 1   • topiramate (TOPAMAX) 25 MG Tab Take 1 Tab by mouth 2 times a day. Titrate with schedule provided 60 Tab 6   • diclofenac EC (VOLTAREN) 75 MG Tablet Delayed Response Take 1 Tab by mouth every day. Take once weekly one per day for headache 8 Tab 6   • aspirin (ASA) 81 MG Chew Tab chewable tablet Take 81 mg by mouth every day.     • fluticasone (FLONASE) 50 MCG/ACT nasal spray Spray 1-2 Sprays in nose 2 times a day as needed. 1 Bottle 3   • ibuprofen (MOTRIN) 600 MG Tab Take 1 Tab by mouth every 6 hours as needed. 30 Tab 0   • nitroglycerin (NITROSTAT) 0.4 MG SL Tab Place 1 Tab under tongue as needed for Chest Pain. 25 Tab 0     No current facility-administered medications for this visit.      He  has a past medical history of Abnormal EKG (12/27/2012); Abnormal EKG (12/27/2012); Bronchitis; CAD (coronary artery disease) (2008); Depression; Diabetes (HCC); HTN (hypertension); Hyperlipidemia; Hypoxia (12/27/2012); Ischemic cardiomyopathy; Leukocytosis (leucocytosis) (12/27/2012); Medically noncompliant (7/18/2013); MI (myocardial  "infarction) (HCC); Migraine; Personal history of venous thrombosis and embolism; Pneumonia; Polysubstance abuse (7/18/2013); [place] spotted fever; Sleep apnea, obstructive; Substance abuse; and Tobacco abuse (7/18/2013).    Social History     Social History   • Marital status: Single     Spouse name: N/A   • Number of children: N/A   • Years of education: N/A     Occupational History   • Not on file.     Social History Main Topics   • Smoking status: Former Smoker     Packs/day: 2.50     Years: 20.00     Types: Cigarettes     Quit date: 9/26/2012   • Smokeless tobacco: Never Used   • Alcohol use No   • Drug use: Yes     Types: Intravenous, Injected (Skin Popping), Marijuana      Comment: occasionally   • Sexual activity: No     Other Topics Concern   • Not on file     Social History Narrative   • No narrative on file       Review of Systems   Constitutional: Negative for chills, diaphoresis, fever and malaise/fatigue.   Respiratory: Negative for shortness of breath.    Cardiovascular: Negative for chest pain.   Gastrointestinal: Negative for abdominal pain, blood in stool, nausea and vomiting.   Genitourinary: Negative for dysuria.   Musculoskeletal: Negative for joint pain and myalgias.        Objective:     Blood pressure 122/78, pulse 80, temperature 36.9 °C (98.4 °F), resp. rate 18, height 1.702 m (5' 7\"), weight 104.9 kg (231 lb 4.8 oz), SpO2 96 %. Body mass index is 36.23 kg/m².     Physical Exam:  Constitutional: Oriented to person, place, and time and well-developed, well-nourished, and in no distress.   HENT:   Head: Normocephalic and atraumatic.  Eyes: Conjunctivae and EOM are normal. Pupils are equal, round, and reactive to light.   Neck: Normal range of motion. Neck supple.   Cardiovascular: Normal rate, regular rhythm, normal heart sounds.Exam reveals no friction rub. No murmur heard.  Pulmonary/Chest: Effort normal and breath sounds normal. No respiratory distress or use of accessory muscles. " No wheezes, rhonchi, or rales.   Abdominal: Soft. Bowel sounds are normal. Exhibits no distension and no mass. There is no tenderness. No hepatosplenomegaly.    Musculoskeletal: Full range of motion.   Neurological: Alert and oriented to person, place, and time. Gait normal.   Skin: Skin is warm and dry. No cyanosis. No edema.  Psychiatric: Mood, memory, affect and judgment normal.       Assessment and Plan:   The following treatment plan was discussed    1. Exposure to hepatitis C  Due to positive exposure with recent sexual partner, labs drawn to r/o active hepatitis infection. If positive, will plan to send to GI specialist for further management. Encourage use of condoms to prevent further exposure to STDs.  - HEPATITIS PANEL ACUTE(4 COMPONENTS); Future  - COMP METABOLIC PANEL; Future    Followup: Return in about 1 week (around 9/17/2018) for For follow-up on lab results.

## 2018-09-11 ENCOUNTER — TELEPHONE (OUTPATIENT)
Dept: MEDICAL GROUP | Facility: PHYSICIAN GROUP | Age: 53
End: 2018-09-11

## 2018-09-11 LAB
HAV IGM SERPL QL IA: NEGATIVE
HBV CORE IGM SER QL: NEGATIVE
HBV SURFACE AG SER QL: NEGATIVE
HCV AB SER QL: NEGATIVE

## 2018-09-11 NOTE — TELEPHONE ENCOUNTER
----- Message from DUSTY Pryor sent at 9/11/2018 10:27 AM PDT -----  Larisa Desouza,    Labs results were negative for any hepatitis infection. Thyroid levels were also within normal limits. Your potassium level was low at 3.1 (normal is 3.5-5.5). I recommend taking an over-the-counter potassium supplement daily of 20 mEq twice daily.    -DUSTY Pryor

## 2020-09-30 ENCOUNTER — HOSPITAL ENCOUNTER (OUTPATIENT)
Facility: MEDICAL CENTER | Age: 55
End: 2020-09-30
Attending: UROLOGY | Admitting: UROLOGY
Payer: MEDICAID

## 2020-10-09 ENCOUNTER — HOSPITAL ENCOUNTER (OUTPATIENT)
Dept: LAB | Facility: MEDICAL CENTER | Age: 55
End: 2020-10-09
Attending: ANESTHESIOLOGY
Payer: MEDICAID

## 2020-10-09 LAB — COVID ORDER STATUS COVID19: NORMAL

## 2020-10-09 PROCEDURE — C9803 HOPD COVID-19 SPEC COLLECT: HCPCS

## 2020-10-09 PROCEDURE — U0003 INFECTIOUS AGENT DETECTION BY NUCLEIC ACID (DNA OR RNA); SEVERE ACUTE RESPIRATORY SYNDROME CORONAVIRUS 2 (SARS-COV-2) (CORONAVIRUS DISEASE [COVID-19]), AMPLIFIED PROBE TECHNIQUE, MAKING USE OF HIGH THROUGHPUT TECHNOLOGIES AS DESCRIBED BY CMS-2020-01-R: HCPCS

## 2020-10-10 LAB
SARS-COV-2 RNA RESP QL NAA+PROBE: DETECTED
SPECIMEN SOURCE: ABNORMAL

## 2022-02-18 ENCOUNTER — TELEPHONE (OUTPATIENT)
Dept: CARDIOLOGY | Facility: MEDICAL CENTER | Age: 57
End: 2022-02-18

## 2022-02-19 NOTE — TELEPHONE ENCOUNTER
Called patient to request records for upcoming appointment with LS. Called to confirm if this will be first time patient is seeing a cardiologist since 2018 and if the patient has had any recent cardiac imaging, testing, or lab work done outside of [facility]. No answer, left voicemail to call back.

## 2022-02-23 ENCOUNTER — TELEPHONE (OUTPATIENT)
Dept: CARDIOLOGY | Facility: MEDICAL CENTER | Age: 57
End: 2022-02-23

## 2022-02-23 NOTE — TELEPHONE ENCOUNTER
CHART PREP SAVED, NS 2-23-22 in [place].      Deo Beard is a 56 y.o. male who is here to est with a local cardiologist for CAD.    Patient seen remotely by Dr. Elena Wilson, then [facility].  I was able to review a note from [facility] Zavala by Dr. Vlad Tay dated 3/9/2018, medications were continued, no testing was ordered.    Has CAD with prior MI around 2008.  Documented to have an ischemic cardiomyopathy although EF by FRANCISCO JAVIER in 2012 was 60%.    Has hypertension,    Has hyperlipidemia,    Has type 2 diabetes,    Has morbid obesity,    Has MARY, on BiPAP.    Prior polysubstance abuse.      DATA REVIEWED by me:  ECG (my personal interpretation) 2/23/2022    FRANCISCO JAVIER 12/30/2012-performed due to inadequate TTE images  Mild concentric left ventricular hypertrophy. Normal left ventricular   systolic function. Est EF 60%.   Interatrial septum bowed toward the left, consistent with elevated   right atrial pressures. Minimal appearance of bubbles. Valsalva   suboptimal.   Structurally normal tricuspid valve. Mild tricuspid regurgitation.     RVSp 40-45 mmHg.     Nuclear stress test 2013  Normal perfusion, EF greater than 70%    Most recent labs:     Blood work from [facility] 7/8/2021 in care everywhere, reviewed

## 2022-06-16 ENCOUNTER — OFFICE VISIT (OUTPATIENT)
Dept: URGENT CARE | Facility: PHYSICIAN GROUP | Age: 57
End: 2022-06-16
Payer: MEDICAID

## 2022-06-16 ENCOUNTER — APPOINTMENT (OUTPATIENT)
Dept: RADIOLOGY | Facility: IMAGING CENTER | Age: 57
End: 2022-06-16
Attending: NURSE PRACTITIONER
Payer: MEDICAID

## 2022-06-16 VITALS
BODY MASS INDEX: 47.13 KG/M2 | DIASTOLIC BLOOD PRESSURE: 82 MMHG | TEMPERATURE: 96.8 F | OXYGEN SATURATION: 94 % | RESPIRATION RATE: 16 BRPM | HEART RATE: 68 BPM | WEIGHT: 311 LBS | SYSTOLIC BLOOD PRESSURE: 120 MMHG | HEIGHT: 68 IN

## 2022-06-16 DIAGNOSIS — S99.921A INJURY OF RIGHT FOOT, INITIAL ENCOUNTER: ICD-10-CM

## 2022-06-16 DIAGNOSIS — S93.601A SPRAIN OF RIGHT FOOT, INITIAL ENCOUNTER: ICD-10-CM

## 2022-06-16 PROCEDURE — 73630 X-RAY EXAM OF FOOT: CPT | Mod: TC,FY,RT | Performed by: NURSE PRACTITIONER

## 2022-06-16 PROCEDURE — 99203 OFFICE O/P NEW LOW 30 MIN: CPT | Performed by: NURSE PRACTITIONER

## 2022-06-16 RX ORDER — GABAPENTIN 300 MG/1
CAPSULE ORAL
COMMUNITY
End: 2022-06-16

## 2022-06-16 NOTE — PROGRESS NOTES
"Deo Beard is a 56 y.o. male who presents for Ankle Injury (R ankle, x1day )      ASHLEY Desouza is a 56-year-old male who presents with a right foot injury.  He was working in his yard and inverted his foot and ankle.  He has has had pain in his foot since the injury.  Injury occurred yesterday evening.  He has been able to walk on it but it is painful.  Treatments tried: Ice and rest.  He is still having pain.  No other aggravating alleviating factors.    ROS    Allergies:     No Known Allergies    PMSFS Hx:  Past Medical History:   Diagnosis Date   • Abnormal EKG 12/27/2012   • Abnormal EKG 12/27/2012   • Bronchitis    • CAD (coronary artery disease) 2008   • Depression    • Diabetes (HCC)    • HTN (hypertension)    • Hyperlipidemia    • Hypoxia 12/27/2012   • Ischemic cardiomyopathy    • Leukocytosis (leucocytosis) 12/27/2012   • Medically noncompliant 7/18/2013   • MI (myocardial infarction) (HCC)     Silent MI , ( old), noted on EKG in 2008   • Migraine    • Personal history of venous thrombosis and embolism     leg \"years ago\"   • Pneumonia     5 months ago   • Polysubstance abuse 7/18/2013   • [place] spotted fever     June 2017   • Sleep apnea, obstructive     CPAP 2010   • Substance abuse     2007; Meth   • Tobacco abuse 7/18/2013     Past Surgical History:   Procedure Laterality Date   • GASTRIC BANDING LAPAROSCOPIC  2015   • PB INCIS/DRAIN FOREARM DEEP ABSCESS  1996    left AC due to IV drug abuse   • WRIST ORIF      left wrist     Family History   Problem Relation Age of Onset   • No Known Problems Mother    • Heart Disease Father         CHF   • Stroke Father    • Heart Attack Father    • Cancer Brother         Colon Cancer   • Arthritis Maternal Grandmother    • Arthritis Maternal Grandfather    • Arthritis Paternal Grandmother    • Cancer Paternal Grandfather    • Heart Disease Paternal Grandfather      Social History     Tobacco Use   • Smoking status: Former Smoker     Packs/day: 2.50    " " Years: 20.00     Pack years: 50.00     Types: Cigarettes     Quit date: 2012     Years since quittin.7   • Smokeless tobacco: Never Used   Substance Use Topics   • Alcohol use: No       Problems:   Patient Active Problem List   Diagnosis   • Sleep apnea   • Hypertension   • Migraines   • Situational depression   • History of MI (myocardial infarction)   • Obesity (BMI 35.0-39.9 without comorbidity) (HCC)   • Hyperlipidemia   • Coronary artery disease involving native coronary artery of native heart without angina pectoris   • Ischemic cardiomyopathy   • Post-traumatic headache   • Obesity (BMI 30-39.9)   • Cystic thyroid nodule   • Exposure to hepatitis C       Medications:   No current outpatient medications on file prior to visit.     No current facility-administered medications on file prior to visit.          Objective:     /82   Pulse 68   Temp 36 °C (96.8 °F) (Temporal)   Resp 16   Ht 1.727 m (5' 8\")   Wt (!) 141 kg (311 lb)   SpO2 94%   BMI 47.29 kg/m²     Physical Exam  Vitals and nursing note reviewed.   Constitutional:       General: He is not in acute distress.     Appearance: Normal appearance. He is normal weight.   Cardiovascular:      Rate and Rhythm: Normal rate.      Pulses: Normal pulses.   Musculoskeletal:        Feet:    Skin:     Capillary Refill: Capillary refill takes less than 2 seconds.   Neurological:      Mental Status: He is alert and oriented to person, place, and time.   Psychiatric:         Mood and Affect: Mood normal.         Behavior: Behavior normal.         Thought Content: Thought content normal.           RADIOLOGY RESULTS   DX-FOOT-COMPLETE 3+ RIGHT    Result Date: 2022 3:49 PM HISTORY/REASON FOR EXAM:  Right lateral foot pain after twisting injury yesterday. TECHNIQUE/EXAM DESCRIPTION AND NUMBER OF VIEWS: 3 views of the RIGHT foot. COMPARISON:  None FINDINGS: There is no focal soft tissue swelling. There is no evidence of displaced " fracture or dislocation. The alignment is maintained. There is degenerative joint space narrowing at the 1st MTP joint with mild hallux valgus deformity. There is a small plantar and a moderate posterior superior calcaneal enthesophyte. There is minimal degenerative spurring in the talonavicular joint.     1.  There is no acute displaced fracture of the right foot.         Xray: Reviewed and interpreted independently by me. I agree with the radiologist's findings.     Assessment /Associated Orders:      1. Sprain of right foot, initial encounter     2. Injury of right foot, initial encounter  DX-FOOT-COMPLETE 3+ RIGHT         Medical Decision Making:    Pt is clinically stable at today's acute urgent care visit.  No acute distress noted. Appropriate for outpatient management at this time.   Acute problem today with uncertain prognosis.   ACE wrap prn discomfort  OTC  analgesic of choice (acetaminophen or NSAID). Follow manufactures dosing and safety precautions.   Results of all diagnostic tests discussed with patient/ caregiver including any incidental findings.   Elevation prn pain  Educated in gentle ROM exercises.     • Discussed DDx, management options (risks,benefits, and alternatives to planned treatment), natural progression and supportive care.  Expressed understanding and the treatment plan was agreed upon. Questions were encouraged and answered   • Return to urgent care prn if new or worsening sx or if there is no improvement in condition prn.    • Educated in Red flags and indications to immediately call 911 or present to the Emergency Department.       Time spent evaluating this patient was at least 31 minutes and includes preparing for visit, counseling/education, exam and evaluation, obtaining history, independent interpretation, ordering lab/test/procedures,medication management and documentation.

## 2022-07-21 ENCOUNTER — APPOINTMENT (OUTPATIENT)
Dept: MEDICAL GROUP | Facility: PHYSICIAN GROUP | Age: 57
End: 2022-07-21
Payer: MEDICAID

## 2022-11-07 ENCOUNTER — APPOINTMENT (OUTPATIENT)
Dept: URGENT CARE | Facility: PHYSICIAN GROUP | Age: 57
End: 2022-11-07
Payer: MEDICAID

## 2023-05-05 ENCOUNTER — APPOINTMENT (OUTPATIENT)
Dept: RADIOLOGY | Facility: IMAGING CENTER | Age: 58
End: 2023-05-05
Attending: FAMILY MEDICINE
Payer: MEDICAID

## 2023-05-05 ENCOUNTER — OFFICE VISIT (OUTPATIENT)
Dept: URGENT CARE | Facility: PHYSICIAN GROUP | Age: 58
End: 2023-05-05
Payer: MEDICAID

## 2023-05-05 VITALS
HEART RATE: 89 BPM | BODY MASS INDEX: 47.13 KG/M2 | HEIGHT: 68 IN | DIASTOLIC BLOOD PRESSURE: 88 MMHG | RESPIRATION RATE: 15 BRPM | OXYGEN SATURATION: 91 % | SYSTOLIC BLOOD PRESSURE: 132 MMHG | WEIGHT: 311 LBS | TEMPERATURE: 98.2 F

## 2023-05-05 DIAGNOSIS — Z86.79 HISTORY OF CHF (CONGESTIVE HEART FAILURE): ICD-10-CM

## 2023-05-05 DIAGNOSIS — M79.89 LEG SWELLING: ICD-10-CM

## 2023-05-05 DIAGNOSIS — R06.02 SHORTNESS OF BREATH: ICD-10-CM

## 2023-05-05 DIAGNOSIS — J01.90 ACUTE BACTERIAL SINUSITIS: ICD-10-CM

## 2023-05-05 DIAGNOSIS — B96.89 ACUTE BACTERIAL SINUSITIS: ICD-10-CM

## 2023-05-05 PROCEDURE — 99214 OFFICE O/P EST MOD 30 MIN: CPT | Performed by: FAMILY MEDICINE

## 2023-05-05 PROCEDURE — 71046 X-RAY EXAM CHEST 2 VIEWS: CPT | Mod: TC,FY | Performed by: FAMILY MEDICINE

## 2023-05-05 RX ORDER — ALBUTEROL SULFATE 90 UG/1
AEROSOL, METERED RESPIRATORY (INHALATION)
Qty: 8.5 G | Refills: 0 | Status: SHIPPED | OUTPATIENT
Start: 2023-05-05 | End: 2023-06-15

## 2023-05-05 RX ORDER — FUROSEMIDE 20 MG/1
TABLET ORAL
Qty: 14 TABLET | Refills: 0 | Status: SHIPPED | OUTPATIENT
Start: 2023-05-05 | End: 2023-05-18 | Stop reason: SDUPTHER

## 2023-05-05 RX ORDER — PREDNISONE 20 MG/1
20 TABLET ORAL DAILY
Qty: 5 TABLET | Refills: 0 | Status: SHIPPED | OUTPATIENT
Start: 2023-05-05 | End: 2023-05-10

## 2023-05-05 RX ORDER — AZITHROMYCIN 250 MG/1
TABLET, FILM COATED ORAL
Qty: 6 TABLET | Refills: 0 | Status: SHIPPED | OUTPATIENT
Start: 2023-05-05 | End: 2023-05-10

## 2023-05-05 ASSESSMENT — FIBROSIS 4 INDEX: FIB4 SCORE: 1.49240501448927304

## 2023-05-05 NOTE — PROGRESS NOTES
"Chief Complaint:    Chief Complaint   Patient presents with    Pharyngitis     Symptoms x 3 days     Leg Swelling    Fever    Shortness of Breath    Headache       History of Present Illness:    Persisting symptoms x 2 weeks, including subjective fever (last time was last night), nasal symptoms, sore throat, cough, and shortness of breath. Legs also have been swollen x 1 month, he has h/o CHF, not currently on diuretic. No history of Asthma. No PCP.      Past Medical History:    Past Medical History:   Diagnosis Date    Abnormal EKG 12/27/2012    Abnormal EKG 12/27/2012    Bronchitis     CAD (coronary artery disease) 2008    Depression     Diabetes ([facility])     HTN (hypertension)     Hyperlipidemia     Hypoxia 12/27/2012    Ischemic cardiomyopathy     Leukocytosis (leucocytosis) 12/27/2012    Medically noncompliant 7/18/2013    MI (myocardial infarction) ([facility])     Silent MI , ( old), noted on EKG in 2008    Migraine     Personal history of venous thrombosis and embolism     leg \"years ago\"    Pneumonia     5 months ago    Polysubstance abuse ([facility]) 7/18/2013    [place] spotted fever     June 2017    Sleep apnea, obstructive     CPAP 2010    Substance abuse ([facility])     2007; Meth    Tobacco abuse 7/18/2013     Past Surgical History:    Past Surgical History:   Procedure Laterality Date    GASTRIC BANDING LAPAROSCOPIC  2015    PB INCIS/DRAIN FOREARM DEEP ABSCESS  1996    left AC due to IV drug abuse    ORIF, WRIST      left wrist     Social History:    Social History     Socioeconomic History    Marital status: Single     Spouse name: Not on file    Number of children: Not on file    Years of education: Not on file    Highest education level: Not on file   Occupational History    Not on file   Tobacco Use    Smoking status: Former     Packs/day: 2.50     Years: 20.00     Pack years: 50.00     Types: Cigarettes     Quit date: 9/26/2012     Years since quitting: 10.6    Smokeless tobacco: Never   Substance and Sexual " "Activity    Alcohol use: No    Drug use: Yes     Types: Intravenous, Injected (Skin Popping), Marijuana     Comment: occasionally    Sexual activity: Never     Partners: Female   Other Topics Concern    Not on file   Social History Narrative    Not on file     Social Determinants of Health     Financial Resource Strain: Not on file   Food Insecurity: Not on file   Transportation Needs: Not on file   Physical Activity: Not on file   Stress: Not on file   Social Connections: Not on file   Intimate Partner Violence: Not on file   Housing Stability: Not on file     Family History:    Family History   Problem Relation Age of Onset    No Known Problems Mother     Heart Disease Father         CHF    Stroke Father     Heart Attack Father     Cancer Brother         Colon Cancer    Arthritis Maternal Grandmother     Arthritis Maternal Grandfather     Arthritis Paternal Grandmother     Cancer Paternal Grandfather     Heart Disease Paternal Grandfather      Medications:    No current outpatient medications on file prior to visit.     No current facility-administered medications on file prior to visit.     Allergies:    No Known Allergies      Vitals:    Vitals:    05/05/23 0958   BP: 132/88   Pulse: 89   Resp: 15   Temp: 36.8 °C (98.2 °F)   TempSrc: Temporal   SpO2: 91%   Weight: (!) 141 kg (311 lb)   Height: 1.727 m (5' 8\")       Physical Exam:    Constitutional: Vital signs reviewed. Appears well-developed and well-nourished. No acute distress.   Eyes: Sclera white, conjunctivae clear.   ENT: External ears normal. External auditory canals normal without discharge. TMs translucent and non-bulging. Hearing normal. Lips/teeth are normal. Oral mucosa pink and moist. Posterior pharynx: WNL.  Neck: Neck supple.   Cardiovascular: Regular rate and rhythm. No murmur. Pitting edema both lower legs - left 2+, right 1-2+.  Pulmonary/Chest: Respirations non-labored. Clear to auscultation bilaterally.  Lymph: Cervical nodes without " tenderness or enlargement.  Musculoskeletal: Normal gait. No muscular atrophy or weakness.  Neurological: Alert and oriented to person, place, and time. Muscle tone normal. Coordination normal.   Skin: No rashes or lesions. Warm, dry, normal turgor.  Psychiatric: Normal mood and affect. Behavior is normal. Judgment and thought content normal.       Diagnostics:    DX-CHEST-2 VIEWS  Order: [id number]  Status: Final result     Visible to patient: Yes (not seen)     Next appt: None     Dx: Shortness of breath; Leg swelling; Hi...     0 Result Notes  Details    Reading Physician Reading Date Result Priority   Rico Nolan M.D.  [phone] 2023 Ur[facility]     Narrative & Impression     2023 10:23 AM     HISTORY/REASON FOR EXAM: Shortness of Breath. SOB, h/o CHF, swelling in legs.  BMI 47     TECHNIQUE/EXAM DESCRIPTION AND NUMBER OF VIEWS:  Two views of the chest.     COMPARISON:  2013     FINDINGS:  Technically challenging evaluation with the patient moving throughout several repeated exposures.  Body habitus also decreases sensitivity of the study.     Cardiomediastinal contours are stable, enlarged. There is hilar prominence.     Central pulmonary vasculature is prominent. There is some central bronchial thickening. Peripheral reticular opacity is not confirmed.     No pleural space process is evident.     IMPRESSION:     Cardiac silhouette enlargement with hilar prominence most likely from pulmonary arterial hypertension. Lymphadenopathy cannot be excluded     Bronchial thickening could indicate edema or bronchitis     I personally reviewed the images.       Assessment / Plan & Medical Decision Makin. Shortness of breath  - DX-CHEST-2 VIEWS; Future  - predniSONE (DELTASONE) 20 MG Tab; Take 1 Tablet by mouth every day for 5 days.  Dispense: 5 Tablet; Refill: 0  - albuterol 108 (90 Base) MCG/ACT Aero Soln inhalation aerosol; 2 PUFFS EVERY 4 HOURS ONLY IF NEEDED FOR COUGH, WHEEZING, OR  SHORTNESS OF BREATH.  Dispense: 8.5 g; Refill: 0    2. Leg swelling  - DX-CHEST-2 VIEWS; Future  - furosemide (LASIX) 20 MG Tab; 1 TAB BY MOUTH ONCE A DAY ONLY IF NEEDED FOR LEG SWELLING.  Dispense: 14 Tablet; Refill: 0    3. History of CHF (congestive heart failure)  - DX-CHEST-2 VIEWS; Future  - furosemide (LASIX) 20 MG Tab; 1 TAB BY MOUTH ONCE A DAY ONLY IF NEEDED FOR LEG SWELLING.  Dispense: 14 Tablet; Refill: 0    4. Acute bacterial sinusitis  - azithromycin (ZITHROMAX) 250 MG Tab; 2 TABS BY MOUTH ON DAY 1, 1 TAB ON DAYS 2-5.  Dispense: 6 Tablet; Refill: 0       Work note given - excuse from work for 5/5/23.    Discussed with him DDX, management options, and risks, benefits, and alternatives to treatment plan agreed upon.    Persisting symptoms x 2 weeks, including subjective fever (last time was last night), nasal symptoms, sore throat, cough, and shortness of breath. Legs also have been swollen x 1 month, he has h/o CHF, not currently on diuretic. No history of Asthma. No PCP.    SpO2 91% on room air. Pitting edema both lower legs - left 2+, right 1-2+.    CXR: Cardiac silhouette enlargement with hilar prominence most likely from pulmonary arterial hypertension. Lymphadenopathy cannot be excluded. Bronchial thickening could indicate edema or bronchitis    H/o CHF, leg swelling x 1 month: chronic condition with acute exacerbation.    Agreeable to medications prescribed.    PCP follow-up appointment made for 5/18/23.    He will return to urgent care if needed.

## 2023-05-05 NOTE — LETTER
May 5, 2023         Patient: Deo Beard   YOB: 1965   Date of Visit: 5/5/2023           To Whom it May Concern:    Deo Beard was seen in my clinic on 5/5/2023.     Please excuse from work for 5/5/23 due to medical condition.     If you have any questions or concerns, please don't hesitate to call.        Sincerely,           Floyd Costa M.D.  Electronically Signed

## 2023-05-18 ENCOUNTER — OFFICE VISIT (OUTPATIENT)
Dept: MEDICAL GROUP | Facility: CLINIC | Age: 58
End: 2023-05-18
Payer: COMMERCIAL

## 2023-05-18 VITALS
HEIGHT: 68 IN | TEMPERATURE: 98.1 F | RESPIRATION RATE: 20 BRPM | WEIGHT: 315 LBS | OXYGEN SATURATION: 92 % | BODY MASS INDEX: 47.74 KG/M2 | DIASTOLIC BLOOD PRESSURE: 82 MMHG | SYSTOLIC BLOOD PRESSURE: 134 MMHG | HEART RATE: 79 BPM

## 2023-05-18 DIAGNOSIS — M79.89 LEG SWELLING: ICD-10-CM

## 2023-05-18 DIAGNOSIS — Z13.21 ENCOUNTER FOR VITAMIN DEFICIENCY SCREENING: ICD-10-CM

## 2023-05-18 DIAGNOSIS — Z12.5 PROSTATE CANCER SCREENING: ICD-10-CM

## 2023-05-18 DIAGNOSIS — M79.89 SWELLING OF LOWER LEG: ICD-10-CM

## 2023-05-18 DIAGNOSIS — J43.9 PULMONARY EMPHYSEMA, UNSPECIFIED EMPHYSEMA TYPE (HCC): ICD-10-CM

## 2023-05-18 DIAGNOSIS — Z86.79 HISTORY OF CHF (CONGESTIVE HEART FAILURE): ICD-10-CM

## 2023-05-18 DIAGNOSIS — I25.5 ISCHEMIC CARDIOMYOPATHY: ICD-10-CM

## 2023-05-18 DIAGNOSIS — E66.9 OBESITY (BMI 35.0-39.9 WITHOUT COMORBIDITY): ICD-10-CM

## 2023-05-18 DIAGNOSIS — I10 PRIMARY HYPERTENSION: ICD-10-CM

## 2023-05-18 DIAGNOSIS — I50.9 CONGESTIVE HEART FAILURE, UNSPECIFIED HF CHRONICITY, UNSPECIFIED HEART FAILURE TYPE (HCC): ICD-10-CM

## 2023-05-18 DIAGNOSIS — R06.02 SHORTNESS OF BREATH: ICD-10-CM

## 2023-05-18 DIAGNOSIS — E04.1 CYSTIC THYROID NODULE: ICD-10-CM

## 2023-05-18 DIAGNOSIS — E78.2 MIXED HYPERLIPIDEMIA: ICD-10-CM

## 2023-05-18 DIAGNOSIS — I50.22 CHRONIC SYSTOLIC HEART FAILURE (HCC): ICD-10-CM

## 2023-05-18 DIAGNOSIS — I25.2 HISTORY OF MI (MYOCARDIAL INFARCTION): ICD-10-CM

## 2023-05-18 PROBLEM — K40.90 NON-RECURRENT UNILATERAL INGUINAL HERNIA: Status: ACTIVE | Noted: 2023-05-18

## 2023-05-18 PROBLEM — R91.1 SOLITARY PULMONARY NODULE: Status: ACTIVE | Noted: 2023-05-18

## 2023-05-18 PROBLEM — F15.20 METHAMPHETAMINE DEPENDENCE (HCC): Status: ACTIVE | Noted: 2021-07-07

## 2023-05-18 PROBLEM — R60.0 BILATERAL LOWER EXTREMITY EDEMA: Status: ACTIVE | Noted: 2021-07-07

## 2023-05-18 PROCEDURE — 3075F SYST BP GE 130 - 139MM HG: CPT | Performed by: PHYSICIAN ASSISTANT

## 2023-05-18 PROCEDURE — 99214 OFFICE O/P EST MOD 30 MIN: CPT | Performed by: PHYSICIAN ASSISTANT

## 2023-05-18 PROCEDURE — 3079F DIAST BP 80-89 MM HG: CPT | Performed by: PHYSICIAN ASSISTANT

## 2023-05-18 RX ORDER — DULOXETIN HYDROCHLORIDE 30 MG/1
CAPSULE, DELAYED RELEASE ORAL
COMMUNITY
End: 2023-06-15

## 2023-05-18 RX ORDER — PRAVASTATIN SODIUM 20 MG
TABLET ORAL
COMMUNITY
End: 2023-06-15

## 2023-05-18 RX ORDER — FLUTICASONE PROPIONATE AND SALMETEROL 100; 50 UG/1; UG/1
1 POWDER RESPIRATORY (INHALATION) EVERY 12 HOURS
Qty: 180 EACH | Refills: 1 | Status: SHIPPED | OUTPATIENT
Start: 2023-05-18 | End: 2023-11-21

## 2023-05-18 RX ORDER — POTASSIUM CHLORIDE 20 MEQ/1
20 TABLET, EXTENDED RELEASE ORAL DAILY
Qty: 90 TABLET | Refills: 2 | Status: SHIPPED | OUTPATIENT
Start: 2023-05-18 | End: 2023-11-27 | Stop reason: SDUPTHER

## 2023-05-18 RX ORDER — LISINOPRIL 5 MG/1
TABLET ORAL
COMMUNITY
End: 2023-06-15

## 2023-05-18 RX ORDER — FUROSEMIDE 20 MG/1
TABLET ORAL
Qty: 90 TABLET | Refills: 2 | Status: SHIPPED | OUTPATIENT
Start: 2023-05-18 | End: 2023-11-27 | Stop reason: SDUPTHER

## 2023-05-18 ASSESSMENT — PATIENT HEALTH QUESTIONNAIRE - PHQ9: CLINICAL INTERPRETATION OF PHQ2 SCORE: 0

## 2023-05-18 ASSESSMENT — FIBROSIS 4 INDEX: FIB4 SCORE: 1.49240501448927304

## 2023-05-18 NOTE — LETTER
[facility]  Marian Chang P.A.-C.  [address] [address] [address]  [place] [facility] [address]  Fax: [phone]   Authorization for Release/Disclosure of   Protected Health Information   Name: JANELLE LOWERY : 1965 SSN: [id number]   Address: [address] [place] [address] [place] [address] Phone:    [phone] (home)    I authorize the entity listed below to release/disclose the PHI below to:   [facility]/Marian Chang P.A.-C. and Marian Chang P.A.-C.   Provider or Entity Name:  [facility] [place]      Address   City, State, Zip   Phone:      Fax:     Reason for request: continuity of care   Information to be released:    [  ] LAST COLONOSCOPY,  including any PATH REPORT and follow-up  [  ] LAST FIT/COLOGUARD RESULT [  ] LAST DEXA  [  ] LAST MAMMOGRAM  [  ] LAST PAP  [  ] LAST LABS [  ] RETINA EXAM REPORT  [  ] IMMUNIZATION RECORDS  [ x ] Release all info      [  ] Check here and initial the line next to each item to release ALL health information INCLUDING  _____ Care and treatment for drug and / or alcohol abuse  _____ HIV testing, infection status, or AIDS  _____ Genetic Testing    DATES OF SERVICE OR TIME PERIOD TO BE DISCLOSED: _____________  I understand and acknowledge that:  * This Authorization may be revoked at any time by you in writing, except if your health information has already been used or disclosed.  * Your health information that will be used or disclosed as a result of you signing this authorization could be re-disclosed by the recipient. If this occurs, your re-disclosed health information may no longer be protected by State or Federal laws.  * You may refuse to sign this Authorization. Your refusal will not affect your ability to obtain treatment.  * This Authorization becomes effective upon signing and will  on (date) __________.      If no date is indicated, this Authorization will  one (1) year from the signature date.    Name: Janelle Lowery  Signature: Date:    5/18/2023     PLEASE FAX REQUESTED RECORDS BACK TO: [phone]

## 2023-05-18 NOTE — PROGRESS NOTES
cc:  leg swelling    Subjective:     Deo Beard is a 57 y.o. male presenting for leg swelling      Patient presents to the office for leg swelling.  Patient presents the office today as a new patient to me with the complaint of leg swelling.  He was seen in the urgent care on 5-5-2023.  He was provided with Lasix 20 mg daily.  He also had a chest x-ray which showed an enlarged heart and possible pulmonary hypertension.  Patient states that the medication helped.  He states that he has had issues in the past where he will have swelling and take medication.  It will be fine for years and then come back.  Patient does have congestive heart failure with pulmonary emphysema, history of MI, cardiomyopathy, obesity, hypertension and hyperlipidemia along with leg swelling.  He is also complaining of shortness of breath.  He was given an antibiotic and a steroid when he was in the urgent care and feels that the steroid was beneficial for him.  However being off the steroid he feels that the shortness of breath is increased.    Patient does indicate that he has a cystic thyroid nodule.  He also indicates that he had a recent thyroid screen.  Previous testing was done at [facility] along with an echo for his heart and he is willing to sign a records release so that we can obtain records.    Patient is also due for routine screening for vitamin D and prostate.    Review of systems:  See above.   Denies any symptoms unless previously indicated.        Current Outpatient Medications:     furosemide (LASIX) 20 MG Tab, 1 TAB BY MOUTH ONCE A DAY ONLY IF NEEDED FOR LEG SWELLING., Disp: 90 Tablet, Rfl: 2    potassium chloride SA (KDUR) 20 MEQ Tab CR, Take 1 Tablet by mouth every day., Disp: 90 Tablet, Rfl: 2    fluticasone-salmeterol (ADVAIR) 100-50 MCG/ACT AEROSOL POWDER, BREATH ACTIVATED, Inhale 1 Puff every 12 hours., Disp: 180 Each, Rfl: 1    albuterol 108 (90 Base) MCG/ACT Aero Soln inhalation aerosol, 2 PUFFS EVERY  "4 HOURS ONLY IF NEEDED FOR COUGH, WHEEZING, OR SHORTNESS OF BREATH., Disp: 8.5 g, Rfl: 0    pravastatin (PRAVACHOL) 20 MG Tab, 1 tab(s) (Patient not taking: Reported on 5/18/2023), Disp: , Rfl:     lisinopril (PRINIVIL) 5 MG Tab, 1 tab(s) (Patient not taking: Reported on 5/18/2023), Disp: , Rfl:     DULoxetine (CYMBALTA) 30 MG Cap DR Particles, 1 cap(s) (Patient not taking: Reported on 5/18/2023), Disp: , Rfl:     Aspirin 81 MG Cap, 1 tab(s) (Patient not taking: Reported on 5/18/2023), Disp: , Rfl:     Allergies, past medical history, past surgical history, family history, social history reviewed and updated    Objective:     Vitals: /82 (BP Location: Left arm, Patient Position: Sitting, BP Cuff Size: Adult)   Pulse 79   Temp 36.7 °C (98.1 °F) (Temporal)   Resp 20   Ht 1.727 m (5' 8\")   Wt (!) 160 kg (352 lb 3.2 oz)   SpO2 92%   BMI 53.55 kg/m²   General: Alert, pleasant, NAD  EYES:   PERRL, EOMI, no icterus or pallor.  Conjunctivae and lids normal.   HENT:  Normocephalic.  External ears normal.  Neck supple.     Heart: Regular rate and rhythm.  S1 and S2 normal.  No murmurs appreciated.  Respiratory: Normal respiratory effort.  Clear to auscultation bilaterally.  Abdomen: obese  Skin: Warm, dry, no rashes.  Musculoskeletal: Gait is normal.  Moves all extremities well.    Extremities: bilateral leg swelling.   Neurological: No tremors, sensation grossly intact, CN2-12 intact.  Psych:  Affect/mood is normal, judgement is good, memory is intact, grooming is appropriate.    Assessment/Plan:     Deo was seen today for establish care and leg swelling.    Diagnoses and all orders for this visit:    Congestive heart failure, unspecified HF chronicity, unspecified heart failure type (HCC)  -     Comp Metabolic Panel; Future  -     Lipid Profile; Future  -     proBrain Natriuretic Peptide, NT; Future  -     furosemide (LASIX) 20 MG Tab; 1 TAB BY MOUTH ONCE A DAY ONLY IF NEEDED FOR LEG SWELLING.  -     " potassium chloride SA (KDUR) 20 MEQ Tab CR; Take 1 Tablet by mouth every day.  -     REFERRAL TO CARDIOLOGY  History of CHF (congestive heart failure)  -     furosemide (LASIX) 20 MG Tab; 1 TAB BY MOUTH ONCE A DAY ONLY IF NEEDED FOR LEG SWELLING.  -     potassium chloride SA (KDUR) 20 MEQ Tab CR; Take 1 Tablet by mouth every day.  -     REFERRAL TO CARDIOLOGY  Chronic systolic heart failure ([facility])  -     Comp Metabolic Panel; Future  -     Lipid Profile; Future  -     proBrain Natriuretic Peptide, NT; Future  -     furosemide (LASIX) 20 MG Tab; 1 TAB BY MOUTH ONCE A DAY ONLY IF NEEDED FOR LEG SWELLING.  -     potassium chloride SA (KDUR) 20 MEQ Tab CR; Take 1 Tablet by mouth every day.  -     REFERRAL TO CARDIOLOGY  Pulmonary emphysema, unspecified emphysema type ([facility])  -     Comp Metabolic Panel; Future  -     Lipid Profile; Future  -     proBrain Natriuretic Peptide, NT; Future  -     REFERRAL TO CARDIOLOGY  History of MI (myocardial infarction)  -     Comp Metabolic Panel; Future  -     Lipid Profile; Future  -     proBrain Natriuretic Peptide, NT; Future  -     REFERRAL TO CARDIOLOGY  Obesity (BMI 35.0-39.9 without comorbidity) ([facility])  -     Comp Metabolic Panel; Future  -     Lipid Profile; Future  -     proBrain Natriuretic Peptide, NT; Future  Ischemic cardiomyopathy  -     Comp Metabolic Panel; Future  -     Lipid Profile; Future  -     proBrain Natriuretic Peptide, NT; Future  -     furosemide (LASIX) 20 MG Tab; 1 TAB BY MOUTH ONCE A DAY ONLY IF NEEDED FOR LEG SWELLING.  -     potassium chloride SA (KDUR) 20 MEQ Tab CR; Take 1 Tablet by mouth every day.  -     REFERRAL TO CARDIOLOGY  Primary hypertension  -     Comp Metabolic Panel; Future  -     Lipid Profile; Future  -     proBrain Natriuretic Peptide, NT; Future  Mixed hyperlipidemia  -     Comp Metabolic Panel; Future  -     Lipid Profile; Future  -     proBrain Natriuretic Peptide, NT; Future  Swelling of lower leg  -     Comp Metabolic Panel; Future  -      Lipid Profile; Future  -     proBrain Natriuretic Peptide, NT; Future  -     furosemide (LASIX) 20 MG Tab; 1 TAB BY MOUTH ONCE A DAY ONLY IF NEEDED FOR LEG SWELLING.  -     potassium chloride SA (KDUR) 20 MEQ Tab CR; Take 1 Tablet by mouth every day.  -     REFERRAL TO CARDIOLOGY  Leg swelling  -     furosemide (LASIX) 20 MG Tab; 1 TAB BY MOUTH ONCE A DAY ONLY IF NEEDED FOR LEG SWELLING.  -     potassium chloride SA (KDUR) 20 MEQ Tab CR; Take 1 Tablet by mouth every day.  -     REFERRAL TO CARDIOLOGY    We will place a new referral to cardiology.  Labs have been ordered to evaluate further and we will follow-up in 2 months.  We will also refill patient's medications.  He was not placed on a potassium supplement and would like to be on a potassium supplement so this has been ordered.  Patient does acknowledge that he has had a history of gastric sleeve.  He would like to have a possible revision.  He is not ready to see the bariatric surgeon at this time but will let us know when he is ready.    Shortness of breath  -     fluticasone-salmeterol (ADVAIR) 100-50 MCG/ACT AEROSOL POWDER, BREATH ACTIVATED; Inhale 1 Puff every 12 hours.    We will start patient on Advair.  He indicates that he has been on this inhaler in the past.  He does not remember how he did with it.  We will follow-up in 2 months and he will contact us sooner if needed.    Cystic thyroid nodule  -     TSH WITH REFLEX TO FT4; Future  Encounter for vitamin deficiency screening  -     VITAMIN D,25 HYDROXY (DEFICIENCY); Future  Prostate cancer screening  -     PROSTATE SPECIFIC AG SCREENING; Future    Further screenings ordered.  We will obtain records from [facility] to evaluate the thyroid ultrasound.        Return in about 8 weeks (around 7/13/2023).    Please note that this dictation was created using voice recognition software. I have made every reasonable attempt to correct obvious errors, but expect that there are errors of grammar and  possible content that I did not discover before finalizing note.

## 2023-05-23 ENCOUNTER — TELEPHONE (OUTPATIENT)
Dept: HEALTH INFORMATION MANAGEMENT | Facility: OTHER | Age: 58
End: 2023-05-23
Payer: COMMERCIAL

## 2023-06-15 ENCOUNTER — OFFICE VISIT (OUTPATIENT)
Dept: CARDIOLOGY | Facility: MEDICAL CENTER | Age: 58
End: 2023-06-15
Attending: PHYSICIAN ASSISTANT
Payer: COMMERCIAL

## 2023-06-15 ENCOUNTER — TELEPHONE (OUTPATIENT)
Dept: CARDIOLOGY | Facility: MEDICAL CENTER | Age: 58
End: 2023-06-15

## 2023-06-15 VITALS
SYSTOLIC BLOOD PRESSURE: 148 MMHG | WEIGHT: 315 LBS | DIASTOLIC BLOOD PRESSURE: 80 MMHG | OXYGEN SATURATION: 93 % | HEART RATE: 89 BPM | BODY MASS INDEX: 47.74 KG/M2 | RESPIRATION RATE: 18 BRPM | HEIGHT: 68 IN

## 2023-06-15 DIAGNOSIS — I10 ESSENTIAL HYPERTENSION: ICD-10-CM

## 2023-06-15 DIAGNOSIS — I25.83 CORONARY ARTERY DISEASE DUE TO LIPID RICH PLAQUE: ICD-10-CM

## 2023-06-15 DIAGNOSIS — I25.10 CORONARY ARTERY DISEASE DUE TO LIPID RICH PLAQUE: ICD-10-CM

## 2023-06-15 DIAGNOSIS — R00.2 PALPITATIONS: ICD-10-CM

## 2023-06-15 DIAGNOSIS — I50.9 CHF (NYHA CLASS II, ACC/AHA STAGE C) (HCC): ICD-10-CM

## 2023-06-15 DIAGNOSIS — E78.5 DYSLIPIDEMIA: ICD-10-CM

## 2023-06-15 DIAGNOSIS — I50.32 CHRONIC HEART FAILURE WITH PRESERVED EJECTION FRACTION (HCC): ICD-10-CM

## 2023-06-15 LAB — EKG IMPRESSION: NORMAL

## 2023-06-15 PROCEDURE — 99215 OFFICE O/P EST HI 40 MIN: CPT | Performed by: STUDENT IN AN ORGANIZED HEALTH CARE EDUCATION/TRAINING PROGRAM

## 2023-06-15 PROCEDURE — 3079F DIAST BP 80-89 MM HG: CPT | Performed by: STUDENT IN AN ORGANIZED HEALTH CARE EDUCATION/TRAINING PROGRAM

## 2023-06-15 PROCEDURE — 93005 ELECTROCARDIOGRAM TRACING: CPT | Performed by: STUDENT IN AN ORGANIZED HEALTH CARE EDUCATION/TRAINING PROGRAM

## 2023-06-15 PROCEDURE — 94618 PULMONARY STRESS TESTING: CPT | Mod: XU | Performed by: STUDENT IN AN ORGANIZED HEALTH CARE EDUCATION/TRAINING PROGRAM

## 2023-06-15 PROCEDURE — 93010 ELECTROCARDIOGRAM REPORT: CPT | Performed by: STUDENT IN AN ORGANIZED HEALTH CARE EDUCATION/TRAINING PROGRAM

## 2023-06-15 PROCEDURE — 3077F SYST BP >= 140 MM HG: CPT | Performed by: STUDENT IN AN ORGANIZED HEALTH CARE EDUCATION/TRAINING PROGRAM

## 2023-06-15 PROCEDURE — 99417 PROLNG OP E/M EACH 15 MIN: CPT | Performed by: STUDENT IN AN ORGANIZED HEALTH CARE EDUCATION/TRAINING PROGRAM

## 2023-06-15 PROCEDURE — 99214 OFFICE O/P EST MOD 30 MIN: CPT | Mod: 25 | Performed by: STUDENT IN AN ORGANIZED HEALTH CARE EDUCATION/TRAINING PROGRAM

## 2023-06-15 RX ORDER — SPIRONOLACTONE 25 MG/1
25 TABLET ORAL DAILY
Qty: 90 TABLET | Refills: 3 | Status: SHIPPED | OUTPATIENT
Start: 2023-06-15 | End: 2023-11-27 | Stop reason: SDUPTHER

## 2023-06-15 ASSESSMENT — MINNESOTA LIVING WITH HEART FAILURE QUESTIONNAIRE (MLHF)
TIRED, FATIGUED OR LOW ON ENERGY: 5
DIFFICULTY SOCIALIZING WITH FAMILY OR FRIENDS: 4
SWELLING IN ANKLES OR LEGS: 4
FEELING LIKE A BURDEN TO FAMILY AND FRIENDS: 4
TOTAL_SCORE: 63
MAKING YOU STAY IN A HOSPITAL: 3
WALKING ABOUT OR CLIMBING STAIRS DIFFICULT: 4
DIFFICULTY TO CONCENTRATE OR REMEMBERING THINGS: 0
DIFFICULTY WITH RECREATIONAL PASTIMES, SPORTS, HOBBIES: 4
HAVING TO SIT OR LIE DOWN DURING THE DAY: 3
DIFFICULTY WORKING TO EARN A LIVING: 4
DIFFICULTY WITH SEXUAL ACTIVITIES: 4
LOSS OF SELF CONTROL IN YOUR LIFE: 0
MAKING YOU SHORT OF BREATH: 4
EATING LESS FOODS YOU LIKE: 2
DIFFICULTY SLEEPING WELL AT NIGHT: 3
GIVING YOU SIDE EFFECTS FROM TREATMENTS: 2
COSTING YOU MONEY FOR MEDICAL CARE: 5
DIFFICULTY GOING AWAY FROM HOME: 3
MAKING YOU FEEL DEPRESSED: 0
WORKING AROUND THE HOUSE OR YARD DIFFICULT: 3
MAKING YOU WORRY: 2

## 2023-06-15 ASSESSMENT — ENCOUNTER SYMPTOMS
NAUSEA: 0
COUGH: 0
NEAR-SYNCOPE: 0
PND: 0
PALPITATIONS: 0
DIZZINESS: 0
WEAKNESS: 0
SYNCOPE: 0
SHORTNESS OF BREATH: 0
FOCAL WEAKNESS: 0
NIGHT SWEATS: 0
ORTHOPNEA: 0
FEVER: 0
IRREGULAR HEARTBEAT: 0
ABDOMINAL PAIN: 0
WHEEZING: 0
VOMITING: 0
DYSPNEA ON EXERTION: 0
DIARRHEA: 0

## 2023-06-15 ASSESSMENT — FIBROSIS 4 INDEX: FIB4 SCORE: 1.49240501448927304

## 2023-06-15 ASSESSMENT — 6 MINUTE WALK TEST (6MWT): TOTAL DISTANCE WALKED (METERS): 146.3

## 2023-06-15 NOTE — PATIENT INSTRUCTIONS
-Labs (ordered PCP) as soon as able  -Start aldactone (spironolactone) 25mg daily  -Labs in 4 weeks

## 2023-06-15 NOTE — PROGRESS NOTES
Cardiology Initial Consultation Note    Date of note:    6/15/2023    Primary Care Provider: Marian Chang P.A.-C.  Referring Provider: Marian Chang P.A.-C.     Patient Name: Deo Beard   YOB: 1965  MRN:              [id number]    Chief Complaint: Establish care    History of Present Illness: Mr. Deo Beard is a 57 y.o. male whose current medical problems include hypertension, dyslipidemia, diabetes, morbid obesity status post gastric bypass, MARY on BiPAP, HFpEF, prior polysubstance abuse, and CAD (medically managed, no prior cath or stents) who is here for cardiac consultation to establish care.    The patient last saw cardiology at [facility] in 2018.  He then was seeing Dr. Alamo cardiology at [facility], last seen 3 months ago.  The patient wore an event monitor due to palpitations, but was not told of any results, and was not able to follow-up there.    The patient reports that he is no longer taking any medications besides furosemide and potassium.  He reports that he previously lost weight, and had not had issues with diabetes or high cholesterol anymore; as such, he stopped taking medications but he does not have any recent lab check.  He has pending orders by his PCP.  He lost weight after gastric bypass surgery but has been gaining weight again.    The patient reports feeling well currently.  He reports dyspnea on exertion but denies any chest pain.  He denies any orthopnea, PND, or leg swelling.  He previously had leg swelling without Lasix.  He still has occasional palpitations.  No syncope or presyncopal episodes.      Cardiovascular Risk Factors:  1. Smoking status: Former smoker  2. Type II Diabetes Mellitus: None/not recently   Lab Results   Component Value Date/Time    HBA1C 5.2 10/31/2017 02:56 PM    HBA1C 10.2 (H) 07/17/2013 11:58 PM     3. Hypertension: On medication  4. Dyslipidemia: None/not recently checked   Cholesterol,Tot   Date Value Ref Range Status  "  10/31/2017 160 100 - 199 mg/dL Final     LDL   Date Value Ref Range Status   10/31/2017 101 (H) <100 mg/dL Final     HDL   Date Value Ref Range Status   10/31/2017 42 >=40 mg/dL Final     Triglycerides   Date Value Ref Range Status   10/31/2017 83 0 - 149 mg/dL Final     5. Family history of early Coronary Artery Disease in a first degree relative (Male less than 55 years of age; Female less than 65 years of age): None  6.  Obesity and/or Metabolic Syndrome: Body mass index is 55.5 kg/m².  7. Sedentary lifestyle: Not active    Review of Systems   Constitutional: Negative for fever, malaise/fatigue and night sweats.   Cardiovascular:  Negative for chest pain, dyspnea on exertion, irregular heartbeat, leg swelling, near-syncope, orthopnea, palpitations, paroxysmal nocturnal dyspnea and syncope.   Respiratory:  Negative for cough, shortness of breath and wheezing.    Gastrointestinal:  Negative for abdominal pain, diarrhea, nausea and vomiting.   Neurological:  Negative for dizziness, focal weakness and weakness.         All other systems reviewed and are negative.     Current Outpatient Medications   Medication Sig Dispense Refill    spironolactone (ALDACTONE) 25 MG Tab Take 1 Tablet by mouth every day. 90 Tablet 3    furosemide (LASIX) 20 MG Tab 1 TAB BY MOUTH ONCE A DAY ONLY IF NEEDED FOR LEG SWELLING. 90 Tablet 2    potassium chloride SA (KDUR) 20 MEQ Tab CR Take 1 Tablet by mouth every day. 90 Tablet 2    fluticasone-salmeterol (ADVAIR) 100-50 MCG/ACT AEROSOL POWDER, BREATH ACTIVATED Inhale 1 Puff every 12 hours. 180 Each 1     No current facility-administered medications for this visit.         No Known Allergies      Physical Exam:  Ambulatory Vitals  BP (!) 148/80 (BP Location: Left arm, Patient Position: Sitting, BP Cuff Size: Adult)   Pulse 89   Resp 18   Ht 1.727 m (5' 8\")   Wt (!) 166 kg (365 lb)   SpO2 93%    Oxygen Therapy:  Pulse Oximetry: 93 %  BP Readings from Last 4 Encounters:   06/15/23 (!) " 148/80   05/18/23 134/82   05/05/23 132/88   06/16/22 120/82       Weight/BMI: Body mass index is 55.5 kg/m².  Wt Readings from Last 4 Encounters:   06/15/23 (!) 166 kg (365 lb)   05/18/23 (!) 160 kg (352 lb 3.2 oz)   05/05/23 (!) 141 kg (311 lb)   06/16/22 (!) 141 kg (311 lb)       General: Obese and in no apparent distress  Eyes: nl conjunctiva, no icteric sclera  ENT: wearing a mask, normal external appearance of ears  Neck: no visible JVP,  no carotid bruits  Lungs: normal respiratory effort, CTAB  Heart: RRR, no murmurs, no rubs or gallops, trace edema bilateral lower extremities. No LV/RV heave on cardiac palpatation. + bilateral radial pulses.  + bilateral dp pulses.   Abdomen: soft, non tender, non distended, no masses, normal bowel sounds.  No HSM.  Extremities/MSK: no clubbing, no cyanosis  Neurological: No focal sensory deficits  Psychiatric: Appropriate affect, A/O x 3, intact judgement and insight  Skin: Warm extremities      Lab Data Review:  Lab Results   Component Value Date/Time    CHOLSTRLTOT 160 10/31/2017 02:56 PM     (H) 10/31/2017 02:56 PM    HDL 42 10/31/2017 02:56 PM    TRIGLYCERIDE 83 10/31/2017 02:56 PM       Lab Results   Component Value Date/Time    SODIUM 135 (L) 10/20/2021 10:57 PM    SODIUM 140 09/10/2018 11:52 AM    POTASSIUM 3.8 10/20/2021 10:57 PM    POTASSIUM 3.1 (L) 09/10/2018 11:52 AM    CHLORIDE 104 10/20/2021 10:57 PM    CHLORIDE 105 09/10/2018 11:52 AM    CO2 23 10/20/2021 10:57 PM    CO2 29 09/10/2018 11:52 AM    GLUCOSE 153 (H) 10/20/2021 10:57 PM    GLUCOSE 97 09/10/2018 11:52 AM    BUN 16 10/20/2021 10:57 PM    BUN 17 09/10/2018 11:52 AM    CREATININE 0.77 (L) 10/20/2021 10:57 PM    CREATININE 0.70 09/10/2018 11:52 AM    GLOMRATE 105 10/20/2021 10:57 PM     Lab Results   Component Value Date/Time    ALKPHOSPHAT 82 08/25/2021 06:45 PM    ALKPHOSPHAT 77 09/10/2018 11:52 AM    ASTSGOT 29 08/25/2021 06:45 PM    ASTSGOT 17 09/10/2018 11:52 AM    ALTSGPT 22 08/25/2021  06:45 PM    ALTSGPT 10 09/10/2018 11:52 AM    TBILIRUBIN 0.7 08/25/2021 06:45 PM    TBILIRUBIN 0.9 09/10/2018 11:52 AM      Lab Results   Component Value Date/Time    WBC 8.9 08/25/2021 06:45 PM    WBC 9.6 10/31/2017 02:56 PM     Lab Results   Component Value Date/Time    HBA1C 5.2 10/31/2017 02:56 PM    HBA1C 10.2 (H) 07/17/2013 11:58 PM         Cardiac Imaging and Procedures Review:    EKG dated 6/15/2023: My personal interpretation is sinus rhythm, borderline prolonged AZ interval, right bundle branch block and left posterior fascicular block, old inferior infarction    FRANCISCO JAVIER 12/30/2012-performed due to inadequate TTE images  Mild concentric left ventricular hypertrophy. Normal left ventricular   systolic function. Est EF 60%.   Interatrial septum bowed toward the left, consistent with elevated   right atrial pressures. Minimal appearance of bubbles. Valsalva   suboptimal.   Structurally normal tricuspid valve. Mild tricuspid regurgitation.     RVSp 40-45 mmHg.      Nuclear stress test 2013  Normal perfusion, EF greater than 70%      Assessment & Plan     1. CHF (NYHA class II, ACC/AHA stage C) ([facility])  Basic Metabolic Panel    spironolactone (ALDACTONE) 25 MG Tab    EC-ECHOCARDIOGRAM COMPLETE W/ CONT    EKG - Clinic Performed      2. Chronic heart failure with preserved ejection fraction (HCC)  Basic Metabolic Panel    spironolactone (ALDACTONE) 25 MG Tab    EC-ECHOCARDIOGRAM COMPLETE W/ CONT      3. Palpitations  CBC WITH DIFFERENTIAL    EKG - Clinic Performed      4. Essential hypertension        5. Dyslipidemia              Shared Medical Decision Making:    HFpEF  NYHA II Stage C Heart Failure  Patient had previous diagnosis of HFpEF.  Close to euvolemic on exam  -Baseline labs to be obtained soon (ordered by PCP)  -Continue Lasix with potassium  -Start spironolactone 25 mg daily  -Obtain labs prior to next visit in 4 weeks  -Obtain echocardiogram prior to next visit to assess LVEF (ordered with contrast due to  previous difficult images)    Hypertension  BP elevated this visit.  -Continue Lasix as above  -Start Aldactone as above.  If BP remains elevated next visit, consider adding additional antihypertensives.    Palpitations  -Obtain records from Dr. Alamo  -Labs pending from PCP (CMP, TSH)    Dyslipidemia  -Pend lipid panel by PCP.  If lipid levels are elevated, will need to restart statin    CAD  Diagnosed via stress test and EKG previously.  Denies any chest pain.  -We will consider adding baby aspirin and statin next visit    A total of 61 minutes of time was spent on day of encounter reviewing medical record, performing history and examination, counseling, ordering medication/test/consults and documentation.      All of the patient's excellent questions were answered to the best of my knowledge and to his satisfaction.  It was a pleasure seeing Mr. Deo Beard in my clinic today. Return in about 4 weeks (around 7/13/2023). Patient is aware to call the cardiology clinic with any questions or concerns.      Kylah Garcia MD  [facility]  [facility]  [address] [place] [id number]  Phone: [phone]  Fax: [phone]

## 2023-06-17 ENCOUNTER — HOSPITAL ENCOUNTER (OUTPATIENT)
Dept: LAB | Facility: MEDICAL CENTER | Age: 58
End: 2023-06-17
Attending: PHYSICIAN ASSISTANT
Payer: COMMERCIAL

## 2023-06-17 ENCOUNTER — HOSPITAL ENCOUNTER (OUTPATIENT)
Dept: LAB | Facility: MEDICAL CENTER | Age: 58
End: 2023-06-17
Attending: STUDENT IN AN ORGANIZED HEALTH CARE EDUCATION/TRAINING PROGRAM
Payer: COMMERCIAL

## 2023-06-17 DIAGNOSIS — I25.2 HISTORY OF MI (MYOCARDIAL INFARCTION): ICD-10-CM

## 2023-06-17 DIAGNOSIS — I50.9 CONGESTIVE HEART FAILURE, UNSPECIFIED HF CHRONICITY, UNSPECIFIED HEART FAILURE TYPE (HCC): ICD-10-CM

## 2023-06-17 DIAGNOSIS — Z12.5 PROSTATE CANCER SCREENING: ICD-10-CM

## 2023-06-17 DIAGNOSIS — E66.9 OBESITY (BMI 35.0-39.9 WITHOUT COMORBIDITY): ICD-10-CM

## 2023-06-17 DIAGNOSIS — J43.9 PULMONARY EMPHYSEMA, UNSPECIFIED EMPHYSEMA TYPE (HCC): ICD-10-CM

## 2023-06-17 DIAGNOSIS — I10 PRIMARY HYPERTENSION: ICD-10-CM

## 2023-06-17 DIAGNOSIS — I25.5 ISCHEMIC CARDIOMYOPATHY: ICD-10-CM

## 2023-06-17 DIAGNOSIS — I50.22 CHRONIC SYSTOLIC HEART FAILURE (HCC): ICD-10-CM

## 2023-06-17 DIAGNOSIS — R00.2 PALPITATIONS: ICD-10-CM

## 2023-06-17 DIAGNOSIS — E78.2 MIXED HYPERLIPIDEMIA: ICD-10-CM

## 2023-06-17 DIAGNOSIS — M79.89 SWELLING OF LOWER LEG: ICD-10-CM

## 2023-06-17 DIAGNOSIS — E04.1 CYSTIC THYROID NODULE: ICD-10-CM

## 2023-06-17 DIAGNOSIS — Z13.21 ENCOUNTER FOR VITAMIN DEFICIENCY SCREENING: ICD-10-CM

## 2023-06-17 LAB
25(OH)D3 SERPL-MCNC: 18 NG/ML (ref 30–100)
ALBUMIN SERPL BCP-MCNC: 4.1 G/DL (ref 3.2–4.9)
ALBUMIN/GLOB SERPL: 1.1 G/DL
ALP SERPL-CCNC: 75 U/L (ref 30–99)
ALT SERPL-CCNC: 18 U/L (ref 2–50)
ANION GAP SERPL CALC-SCNC: 10 MMOL/L (ref 7–16)
AST SERPL-CCNC: 23 U/L (ref 12–45)
BASOPHILS # BLD AUTO: 0.8 % (ref 0–1.8)
BASOPHILS # BLD: 0.07 K/UL (ref 0–0.12)
BILIRUB SERPL-MCNC: 0.5 MG/DL (ref 0.1–1.5)
BUN SERPL-MCNC: 15 MG/DL (ref 8–22)
CALCIUM ALBUM COR SERPL-MCNC: 8.6 MG/DL (ref 8.5–10.5)
CALCIUM SERPL-MCNC: 8.7 MG/DL (ref 8.5–10.5)
CHLORIDE SERPL-SCNC: 99 MMOL/L (ref 96–112)
CHOLEST SERPL-MCNC: 178 MG/DL (ref 100–199)
CO2 SERPL-SCNC: 27 MMOL/L (ref 20–33)
CREAT SERPL-MCNC: 0.66 MG/DL (ref 0.5–1.4)
EOSINOPHIL # BLD AUTO: 0.25 K/UL (ref 0–0.51)
EOSINOPHIL NFR BLD: 2.9 % (ref 0–6.9)
ERYTHROCYTE [DISTWIDTH] IN BLOOD BY AUTOMATED COUNT: 43.8 FL (ref 35.9–50)
FASTING STATUS PATIENT QL REPORTED: NORMAL
GFR SERPLBLD CREATININE-BSD FMLA CKD-EPI: 110 ML/MIN/1.73 M 2
GLOBULIN SER CALC-MCNC: 3.6 G/DL (ref 1.9–3.5)
GLUCOSE SERPL-MCNC: 99 MG/DL (ref 65–99)
HCT VFR BLD AUTO: 46.7 % (ref 42–52)
HDLC SERPL-MCNC: 39 MG/DL
HGB BLD-MCNC: 15.6 G/DL (ref 14–18)
IMM GRANULOCYTES # BLD AUTO: 0.05 K/UL (ref 0–0.11)
IMM GRANULOCYTES NFR BLD AUTO: 0.6 % (ref 0–0.9)
LDLC SERPL CALC-MCNC: 119 MG/DL
LYMPHOCYTES # BLD AUTO: 1.61 K/UL (ref 1–4.8)
LYMPHOCYTES NFR BLD: 18.7 % (ref 22–41)
MCH RBC QN AUTO: 30.1 PG (ref 27–33)
MCHC RBC AUTO-ENTMCNC: 33.4 G/DL (ref 32.3–36.5)
MCV RBC AUTO: 90.2 FL (ref 81.4–97.8)
MONOCYTES # BLD AUTO: 0.61 K/UL (ref 0–0.85)
MONOCYTES NFR BLD AUTO: 7.1 % (ref 0–13.4)
NEUTROPHILS # BLD AUTO: 6 K/UL (ref 1.82–7.42)
NEUTROPHILS NFR BLD: 69.9 % (ref 44–72)
NRBC # BLD AUTO: 0 K/UL
NRBC BLD-RTO: 0 /100 WBC (ref 0–0.2)
NT-PROBNP SERPL IA-MCNC: 275 PG/ML (ref 0–125)
PLATELET # BLD AUTO: 215 K/UL (ref 164–446)
PMV BLD AUTO: 11.5 FL (ref 9–12.9)
POTASSIUM SERPL-SCNC: 4.7 MMOL/L (ref 3.6–5.5)
PROT SERPL-MCNC: 7.7 G/DL (ref 6–8.2)
PSA SERPL-MCNC: 1.76 NG/ML (ref 0–4)
RBC # BLD AUTO: 5.18 M/UL (ref 4.7–6.1)
SODIUM SERPL-SCNC: 136 MMOL/L (ref 135–145)
TRIGL SERPL-MCNC: 100 MG/DL (ref 0–149)
TSH SERPL DL<=0.005 MIU/L-ACNC: 1.12 UIU/ML (ref 0.38–5.33)
WBC # BLD AUTO: 8.6 K/UL (ref 4.8–10.8)

## 2023-06-17 PROCEDURE — 80053 COMPREHEN METABOLIC PANEL: CPT

## 2023-06-17 PROCEDURE — 83880 ASSAY OF NATRIURETIC PEPTIDE: CPT

## 2023-06-17 PROCEDURE — 84153 ASSAY OF PSA TOTAL: CPT

## 2023-06-17 PROCEDURE — 80061 LIPID PANEL: CPT

## 2023-06-17 PROCEDURE — 85025 COMPLETE CBC W/AUTO DIFF WBC: CPT

## 2023-06-17 PROCEDURE — 82306 VITAMIN D 25 HYDROXY: CPT

## 2023-06-17 PROCEDURE — 36415 COLL VENOUS BLD VENIPUNCTURE: CPT

## 2023-06-17 PROCEDURE — 84443 ASSAY THYROID STIM HORMONE: CPT

## 2023-06-19 ENCOUNTER — HOSPITAL ENCOUNTER (OUTPATIENT)
Dept: CARDIOLOGY | Facility: MEDICAL CENTER | Age: 58
End: 2023-06-19
Attending: STUDENT IN AN ORGANIZED HEALTH CARE EDUCATION/TRAINING PROGRAM
Payer: COMMERCIAL

## 2023-06-19 DIAGNOSIS — I50.9 CHF (NYHA CLASS II, ACC/AHA STAGE C) (HCC): ICD-10-CM

## 2023-06-19 DIAGNOSIS — I50.32 CHRONIC HEART FAILURE WITH PRESERVED EJECTION FRACTION (HCC): ICD-10-CM

## 2023-06-19 PROCEDURE — 700117 HCHG RX CONTRAST REV CODE 255: Mod: UD | Performed by: STUDENT IN AN ORGANIZED HEALTH CARE EDUCATION/TRAINING PROGRAM

## 2023-06-19 PROCEDURE — 93306 TTE W/DOPPLER COMPLETE: CPT

## 2023-06-20 PROCEDURE — 93306 TTE W/DOPPLER COMPLETE: CPT | Mod: 26 | Performed by: STUDENT IN AN ORGANIZED HEALTH CARE EDUCATION/TRAINING PROGRAM

## 2023-06-20 RX ADMIN — HUMAN ALBUMIN MICROSPHERES AND PERFLUTREN 3 ML: 10; .22 INJECTION, SOLUTION INTRAVENOUS at 13:05

## 2023-07-13 ENCOUNTER — OFFICE VISIT (OUTPATIENT)
Dept: CARDIOLOGY | Facility: MEDICAL CENTER | Age: 58
End: 2023-07-13
Attending: NURSE PRACTITIONER
Payer: COMMERCIAL

## 2023-07-13 ENCOUNTER — OFFICE VISIT (OUTPATIENT)
Dept: MEDICAL GROUP | Facility: CLINIC | Age: 58
End: 2023-07-13
Payer: COMMERCIAL

## 2023-07-13 VITALS
SYSTOLIC BLOOD PRESSURE: 124 MMHG | BODY MASS INDEX: 47.74 KG/M2 | TEMPERATURE: 98.6 F | WEIGHT: 315 LBS | HEART RATE: 69 BPM | RESPIRATION RATE: 18 BRPM | DIASTOLIC BLOOD PRESSURE: 82 MMHG | HEIGHT: 68 IN | OXYGEN SATURATION: 92 %

## 2023-07-13 VITALS
BODY MASS INDEX: 47.74 KG/M2 | SYSTOLIC BLOOD PRESSURE: 142 MMHG | RESPIRATION RATE: 18 BRPM | WEIGHT: 315 LBS | DIASTOLIC BLOOD PRESSURE: 80 MMHG | HEART RATE: 79 BPM | HEIGHT: 68 IN | OXYGEN SATURATION: 91 %

## 2023-07-13 DIAGNOSIS — E55.9 VITAMIN D DEFICIENCY: ICD-10-CM

## 2023-07-13 DIAGNOSIS — I50.9 CHF (NYHA CLASS II, ACC/AHA STAGE C) (HCC): ICD-10-CM

## 2023-07-13 DIAGNOSIS — M25.562 CHRONIC PAIN OF LEFT KNEE: ICD-10-CM

## 2023-07-13 DIAGNOSIS — I50.9 CONGESTIVE HEART FAILURE, UNSPECIFIED HF CHRONICITY, UNSPECIFIED HEART FAILURE TYPE (HCC): ICD-10-CM

## 2023-07-13 DIAGNOSIS — G89.29 CHRONIC PAIN OF LEFT KNEE: ICD-10-CM

## 2023-07-13 DIAGNOSIS — I25.83 CORONARY ARTERY DISEASE DUE TO LIPID RICH PLAQUE: ICD-10-CM

## 2023-07-13 DIAGNOSIS — M79.89 SWELLING OF LOWER LEG: ICD-10-CM

## 2023-07-13 DIAGNOSIS — I50.22 CHRONIC SYSTOLIC HEART FAILURE (HCC): ICD-10-CM

## 2023-07-13 DIAGNOSIS — I50.32 CHRONIC HEART FAILURE WITH PRESERVED EJECTION FRACTION (HCC): ICD-10-CM

## 2023-07-13 DIAGNOSIS — I10 ESSENTIAL HYPERTENSION: ICD-10-CM

## 2023-07-13 DIAGNOSIS — I25.10 CORONARY ARTERY DISEASE DUE TO LIPID RICH PLAQUE: ICD-10-CM

## 2023-07-13 DIAGNOSIS — M25.552 PAIN OF LEFT HIP: ICD-10-CM

## 2023-07-13 DIAGNOSIS — M54.50 ACUTE LEFT-SIDED LOW BACK PAIN WITHOUT SCIATICA: ICD-10-CM

## 2023-07-13 DIAGNOSIS — E78.5 DYSLIPIDEMIA: ICD-10-CM

## 2023-07-13 PROCEDURE — 99214 OFFICE O/P EST MOD 30 MIN: CPT | Performed by: NURSE PRACTITIONER

## 2023-07-13 PROCEDURE — 3079F DIAST BP 80-89 MM HG: CPT | Performed by: NURSE PRACTITIONER

## 2023-07-13 PROCEDURE — 3074F SYST BP LT 130 MM HG: CPT | Performed by: PHYSICIAN ASSISTANT

## 2023-07-13 PROCEDURE — 3077F SYST BP >= 140 MM HG: CPT | Performed by: NURSE PRACTITIONER

## 2023-07-13 PROCEDURE — 3079F DIAST BP 80-89 MM HG: CPT | Performed by: PHYSICIAN ASSISTANT

## 2023-07-13 PROCEDURE — 99212 OFFICE O/P EST SF 10 MIN: CPT | Performed by: NURSE PRACTITIONER

## 2023-07-13 PROCEDURE — 99214 OFFICE O/P EST MOD 30 MIN: CPT | Performed by: PHYSICIAN ASSISTANT

## 2023-07-13 RX ORDER — ROSUVASTATIN CALCIUM 10 MG/1
10 TABLET, COATED ORAL EVERY EVENING
Qty: 90 TABLET | Refills: 3 | Status: SHIPPED | OUTPATIENT
Start: 2023-07-13 | End: 2023-11-27 | Stop reason: SDUPTHER

## 2023-07-13 RX ORDER — CHOLECALCIFEROL (VITAMIN D3) 125 MCG
1 CAPSULE ORAL DAILY
Qty: 90 CAPSULE | Refills: 1 | Status: SHIPPED | OUTPATIENT
Start: 2023-07-13 | End: 2024-01-08

## 2023-07-13 RX ORDER — LOSARTAN POTASSIUM 25 MG/1
25 TABLET ORAL DAILY
Qty: 90 TABLET | Refills: 3 | Status: SHIPPED | OUTPATIENT
Start: 2023-07-13 | End: 2023-08-24 | Stop reason: SDUPTHER

## 2023-07-13 ASSESSMENT — ENCOUNTER SYMPTOMS
SHORTNESS OF BREATH: 0
COUGH: 0
ORTHOPNEA: 0
PND: 0
CLAUDICATION: 0
PALPITATIONS: 1
FEVER: 0
MYALGIAS: 0
ABDOMINAL PAIN: 0
DIZZINESS: 0

## 2023-07-13 ASSESSMENT — FIBROSIS 4 INDEX
FIB4 SCORE: 1.41
FIB4 SCORE: 1.41

## 2023-07-13 NOTE — PROGRESS NOTES
Chief Complaint   Patient presents with    Hyperlipidemia     F/V Dx: Mixed hyperlipidemia    Hypertension     F/V Dx: Essential hypertension    Congestive Heart Failure     F/V Dx: CHF (NYHA class II, ACC/AHA stage C) ([facility])       Subjective     Deo Beard is a 57 y.o. male who presents today for follow-up on his heart failure, hypertension, possible history of CAD.    Patient of Dr. Garcia.  Patient was last seen in clinic on 6/15/2023.  During that visit, he was started on spironolactone 25 mg daily and sent for repeat echocardiogram and lab testing.    Patient reports no problems starting spironolactone.    Patient has been under stress over the past month, his sister had passed away and his girlfriend suffered from an aneurysm.  She had a second surgery today.    Patient reports having a rare palpitation and improvement of lower extremity edema.    He denies chest pain, orthopnea, PND, shortness of breath or dizziness/lightheadedness.    He has not been checking his blood pressure.    Patient will be following up with his primary care today he is hoping to get a referral for weight loss surgery.  Patient previously had a weight loss surgery around 2015 at which time he had a sleeve and switch in [place].  He mentions that provider committed suicide due to malpractice.    Additionally, patient has the following medical problems:     -Previous heart failure    -Presumed CAD on prior stress test    -Diabetes    -Dyslipidemia    -MARY on BiPAP    -Prior polysubstance abuse    Past Medical History:   Diagnosis Date    Abnormal EKG 12/27/2012    Abnormal EKG 12/27/2012    Bronchitis     CAD (coronary artery disease) 2008    Depression     Diabetes ([facility])     HTN (hypertension)     Hyperlipidemia     Hypoxia 12/27/2012    Ischemic cardiomyopathy     Leukocytosis (leucocytosis) 12/27/2012    Medically noncompliant 7/18/2013    MI (myocardial infarction) ([facility])     Silent MI , ( old), noted on EKG in 2008    Migraine   "   Personal history of venous thrombosis and embolism     leg \"years ago\"    Pneumonia     5 months ago    Polysubstance abuse (HCC) 7/18/2013    Kenmar spotted fever     June 2017    Sleep apnea, obstructive     CPAP 2010    Substance abuse (HCC)     2007; Meth    Tobacco abuse 7/18/2013     Past Surgical History:   Procedure Laterality Date    GASTRIC BANDING LAPAROSCOPIC  2015    gastric sleeve and switch    PB INCIS/DRAIN FOREARM DEEP ABSCESS  1996    left AC due to IV drug abuse    HYDROCELECTOMY ADULT      ORCHIECTOMY Right     ORIF, WRIST      left wrist     Family History   Problem Relation Age of Onset    Cancer Mother         intestine    Heart Disease Father         CHF    Stroke Father     Heart Attack Father     Cancer Brother         Colon Cancer    Arthritis Maternal Grandmother     Arthritis Maternal Grandfather     Arthritis Paternal Grandmother     Cancer Paternal Grandfather     Heart Disease Paternal Grandfather      Social History     Socioeconomic History    Marital status: Single     Spouse name: Not on file    Number of children: Not on file    Years of education: Not on file    Highest education level: Not on file   Occupational History    Not on file   Tobacco Use    Smoking status: Former     Packs/day: 2.50     Years: 20.00     Pack years: 50.00     Types: Cigarettes     Quit date: 9/26/2012     Years since quitting: 10.8    Smokeless tobacco: Never   Vaping Use    Vaping Use: Never used   Substance and Sexual Activity    Alcohol use: No    Drug use: Not Currently     Types: Intravenous, Injected (Skin Popping), Marijuana     Comment: occasionally    Sexual activity: Never     Partners: Female   Other Topics Concern    Not on file   Social History Narrative    Not on file     Social Determinants of Health     Financial Resource Strain: Not on file   Food Insecurity: Not on file   Transportation Needs: Not on file   Physical Activity: Not on file   Stress: Not on file   Social " "Connections: Not on file   Intimate Partner Violence: Not on file   Housing Stability: Not on file     No Known Allergies  Outpatient Encounter Medications as of 7/13/2023   Medication Sig Dispense Refill    rosuvastatin (CRESTOR) 10 MG Tab Take 1 Tablet by mouth every evening. 90 Tablet 3    losartan (COZAAR) 25 MG Tab Take 1 Tablet by mouth every day. 90 Tablet 3    spironolactone (ALDACTONE) 25 MG Tab Take 1 Tablet by mouth every day. 90 Tablet 3    furosemide (LASIX) 20 MG Tab 1 TAB BY MOUTH ONCE A DAY ONLY IF NEEDED FOR LEG SWELLING. 90 Tablet 2    potassium chloride SA (KDUR) 20 MEQ Tab CR Take 1 Tablet by mouth every day. 90 Tablet 2    fluticasone-salmeterol (ADVAIR) 100-50 MCG/ACT AEROSOL POWDER, BREATH ACTIVATED Inhale 1 Puff every 12 hours. 180 Each 1     No facility-administered encounter medications on file as of 7/13/2023.     Review of Systems   Constitutional:  Negative for fever and malaise/fatigue.   Respiratory:  Negative for cough and shortness of breath.    Cardiovascular:  Positive for palpitations (rare) and leg swelling (improving). Negative for chest pain, orthopnea, claudication and PND.   Gastrointestinal:  Negative for abdominal pain.   Musculoskeletal:  Negative for myalgias.   Neurological:  Negative for dizziness.   All other systems reviewed and are negative.             Objective     BP (!) 142/80 (BP Location: Left arm, Patient Position: Sitting, BP Cuff Size: Adult)   Pulse 79   Resp 18   Ht 1.727 m (5' 8\")   Wt (!) 164 kg (361 lb)   SpO2 91%   BMI 54.89 kg/m²     Physical Exam  Vitals reviewed.   Constitutional:       Appearance: He is well-developed. He is morbidly obese.   HENT:      Head: Normocephalic and atraumatic.   Eyes:      Pupils: Pupils are equal, round, and reactive to light.   Neck:      Vascular: No JVD.   Cardiovascular:      Rate and Rhythm: Normal rate and regular rhythm.      Heart sounds: Normal heart sounds.   Pulmonary:      Effort: Pulmonary effort is " normal. No respiratory distress.      Breath sounds: Normal breath sounds. No wheezing or rales.   Abdominal:      General: Bowel sounds are normal.      Palpations: Abdomen is soft.   Musculoskeletal:         General: Normal range of motion.      Cervical back: Normal range of motion and neck supple.      Right lower leg: Edema (trace) present.      Left lower leg: Edema (trace) present.   Skin:     General: Skin is warm and dry.   Neurological:      General: No focal deficit present.      Mental Status: He is alert and oriented to person, place, and time.   Psychiatric:         Behavior: Behavior normal.       Lab Results   Component Value Date/Time    CHOLSTRLTOT 178 06/17/2023 09:26 AM     (H) 06/17/2023 09:26 AM    HDL 39 (A) 06/17/2023 09:26 AM    TRIGLYCERIDE 100 06/17/2023 09:26 AM       Lab Results   Component Value Date/Time    SODIUM 136 06/17/2023 09:26 AM    POTASSIUM 4.7 06/17/2023 09:26 AM    CHLORIDE 99 06/17/2023 09:26 AM    CO2 27 06/17/2023 09:26 AM    GLUCOSE 99 06/17/2023 09:26 AM    BUN 15 06/17/2023 09:26 AM    CREATININE 0.66 06/17/2023 09:26 AM    GLOMRATE 105 10/20/2021 10:57 PM     Lab Results   Component Value Date/Time    ALKPHOSPHAT 75 06/17/2023 09:26 AM    ASTSGOT 23 06/17/2023 09:26 AM    ALTSGPT 18 06/17/2023 09:26 AM    TBILIRUBIN 0.5 06/17/2023 09:26 AM      Echocardiogram 12/30/2012  Mild concentric left ventricular hypertrophy. Normal left ventricular   systolic function. Est EF 60%.   Interatrial septum bowed toward the left, consistent with elevated   right atrial pressures. Minimal appearance of bubbles. Valsalva suboptimal.   Structurally normal tricuspid valve. Mild tricuspid regurgitation.     RVSp 40-45 mmHg.      Transthoracic Echo Report 6/19/2023  Technically difficult study.  Normal left ventricular systolic function. The left ventricular   ejection fraction is visually estimated to be 65%.  The right ventricle is not well visualized.  The valves were  suboptimally visualized.  No recent prior study for comparison.          Assessment & Plan     1. Essential hypertension  rosuvastatin (CRESTOR) 10 MG Tab    losartan (COZAAR) 25 MG Tab    Basic Metabolic Panel      2. Dyslipidemia  rosuvastatin (CRESTOR) 10 MG Tab    losartan (COZAAR) 25 MG Tab    Basic Metabolic Panel      3. CHF (NYHA class II, ACC/AHA stage C) ([facility])        4. Chronic heart failure with preserved ejection fraction (HCC)        5. Coronary artery disease due to lipid rich plaque            Medical Decision Making: Today's Assessment/Status/Plan:            HFpEF, stage C, NYHA class II, LVEF 65%  -Echo from [facility] showed an EF of 45 to 50%, recent echo 6/19/2023 showed improvement to 65%  -Patient fairly euvolemic except some trace lower extremity edema  -Continue furosemide 20 mg daily as needed  -Continue potassium 20 mEq daily with diuretic  -Continue spironolactone 25 mg daily  -BMP in 6 weeks  -Obtain echocardiogram prior to next visit to assess LVEF (ordered with contrast due to previous difficult images)     Hypertension  BP borderline elevated  -Start losartan 25 mg daily  -Continue furosemide, spironolactone  -BMP in 6 weeks     Palpitations  -Some records received, no event monitor results and records, will attempt to review request records from Dr. Alamo     Dyslipidemia  -Last  on 6/17/23  -Recommend restarting cholesterol medication.  He is agreeable, start rosuvastatin 10 mg daily  -We will repeat lipid panel in the future     CAD  -Diagnosed via stress test and EKG previously.  Denies any chest pain.  Patient did not have angiogram  -Statin started at this visit, will discuss aspirin at next visit    FU in clinic in 6 weeks with labs. Sooner if needed.    Patient verbalizes understanding and agrees with the plan of care.     PLEASE NOTE: This Note was created using voice recognition Software. I have made every reasonable attempt to correct obvious errors, but I expect that  there are errors of grammar and possibly content that I did not discover before finalizing the note

## 2023-07-14 NOTE — PROGRESS NOTES
cc: CHF    Subjective:     Deo Beard is a 57 y.o. male presenting for CHF    Patient presents to the office for CHF.  He was actually seen by cardiology today.  He does have HFpEF NYHA II stage C heart failure.  Currently cardiology would like to continue with Lasix, start spironolactone and follow-up in 4 weeks with labs.  They did indicate that if blood pressure remains elevated they will need to add further hypertensive medications.  Along with labs reviewed, I it does show a vitamin D deficiency.  Patient is not taking a supplement at this time.    Patient has been having low back and hip pain on the left side. He states that it will get worse with laying down.  He states it is a sharp pain.     Patient is having chronic left knee pain.  He states that before moving here, he had a knee brace which worked very well for him but has had difficulty finding a new brace as his is worn out.  He is requesting help with this at this time.    Review of systems:  See above.   Denies any symptoms unless previously indicated.        Current Outpatient Medications:     rosuvastatin (CRESTOR) 10 MG Tab, Take 1 Tablet by mouth every evening., Disp: 90 Tablet, Rfl: 3    losartan (COZAAR) 25 MG Tab, Take 1 Tablet by mouth every day., Disp: 90 Tablet, Rfl: 3    Cholecalciferol (VITAMIN D) 125 MCG (5000 UT) Cap, Take 1 Capsule by mouth every day., Disp: 90 Capsule, Rfl: 1    NON SPECIFIED, Knee brace, Disp: 1 Each, Rfl: 0    spironolactone (ALDACTONE) 25 MG Tab, Take 1 Tablet by mouth every day., Disp: 90 Tablet, Rfl: 3    furosemide (LASIX) 20 MG Tab, 1 TAB BY MOUTH ONCE A DAY ONLY IF NEEDED FOR LEG SWELLING., Disp: 90 Tablet, Rfl: 2    potassium chloride SA (KDUR) 20 MEQ Tab CR, Take 1 Tablet by mouth every day., Disp: 90 Tablet, Rfl: 2    fluticasone-salmeterol (ADVAIR) 100-50 MCG/ACT AEROSOL POWDER, BREATH ACTIVATED, Inhale 1 Puff every 12 hours., Disp: 180 Each, Rfl: 1    Allergies, past medical history, past  "surgical history, family history, social history reviewed and updated    Objective:     Vitals: /82 (BP Location: Left arm, Patient Position: Sitting, BP Cuff Size: Large adult)   Pulse 69   Temp 37 °C (98.6 °F) (Temporal)   Resp 18   Ht 1.727 m (5' 8\")   Wt (!) 163 kg (358 lb 6.4 oz)   SpO2 92%   BMI 54.49 kg/m²   General: Alert, pleasant, NAD  EYES:   PERRL, EOMI, no icterus or pallor.  Neck supple.     Respiratory: Normal respiratory effort.    Abdomen: Obese  Skin: Warm, dry, no rashes.  Musculoskeletal: Gait is normal.  Moves all extremities well.    Extremities: Normal range of motion all extremities.   Neurological: No tremors, sensation grossly intact,  CN2-12 intact.  Psych:  Affect/mood is normal, judgement is good, memory is intact, grooming is appropriate.     Latest Reference Range & Units 06/17/23 09:26   Sodium 135 - 145 mmol/L 136   Potassium 3.6 - 5.5 mmol/L 4.7   Chloride 96 - 112 mmol/L 99   Co2 20 - 33 mmol/L 27   Anion Gap 7.0 - 16.0  10.0   Glucose 65 - 99 mg/dL 99   Bun 8 - 22 mg/dL 15   Creatinine 0.50 - 1.40 mg/dL 0.66   GFR (CKD-EPI) >60 mL/min/1.73 m 2 110   Calcium 8.5 - 10.5 mg/dL 8.7   Correct Calcium 8.5 - 10.5 mg/dL 8.6   AST(SGOT) 12 - 45 U/L 23   ALT(SGPT) 2 - 50 U/L 18   Alkaline Phosphatase 30 - 99 U/L 75   Total Bilirubin 0.1 - 1.5 mg/dL 0.5   Albumin 3.2 - 4.9 g/dL 4.1   Total Protein 6.0 - 8.2 g/dL 7.7   Globulin 1.9 - 3.5 g/dL 3.6 (H)   A-G Ratio g/dL 1.1   Fasting Status  Fasting   Cholesterol,Tot 100 - 199 mg/dL 178   Triglycerides 0 - 149 mg/dL 100   HDL >=40 mg/dL 39 !   LDL <100 mg/dL 119 (H)   NT-proBNP 0 - 125 pg/mL 275 (H)   25-Hydroxy   Vitamin D 25 30 - 100 ng/mL 18 (L)   Prostatic Specific Antigen Tot 0.00 - 4.00 ng/mL 1.76   TSH 0.380 - 5.330 uIU/mL 1.120   (H): Data is abnormally high  !: Data is abnormal  (L): Data is abnormally low    Assessment/Plan:     Deo was seen today for lab results and leg swelling.    Diagnoses and all orders for this " visit:    Congestive heart failure, unspecified HF chronicity, unspecified heart failure type (HCC)  -     Lipid Profile; Future  -     Comp Metabolic Panel; Future  Chronic systolic heart failure (HCC)  -     Lipid Profile; Future  -     Comp Metabolic Panel; Future  Swelling of lower leg    Patient has seen cardiology.  They have added spironolactone.  We will continue to monitor as well.  Repeat labs in 6 months.    Vitamin D deficiency  -     Cholecalciferol (VITAMIN D) 125 MCG (5000 UT) Cap; Take 1 Capsule by mouth every day.  -     VITAMIN D,25 HYDROXY (DEFICIENCY); Future    Start patient on vitamin D supplement.  Repeat labs in 6 months.    Pain of left hip  -     DX-HIP-COMPLETE - UNILATERAL 2+ LEFT; Future  Acute left-sided low back pain without sciatica  -     DX-LUMBAR SPINE-2 OR 3 VIEWS; Future    X-rays of been ordered to evaluate further.  We will notify patient of results when received.    Chronic pain of left knee  -     NON SPECIFIED; Knee brace    We will submit a prescription for knee brace.        Return in about 6 months (around 1/13/2024).    Please note that this dictation was created using voice recognition software. I have made every reasonable attempt to correct obvious errors, but expect that there are errors of grammar and possible content that I did not discover before finalizing note.

## 2023-07-22 ENCOUNTER — APPOINTMENT (OUTPATIENT)
Dept: RADIOLOGY | Facility: IMAGING CENTER | Age: 58
End: 2023-07-22
Attending: PHYSICIAN ASSISTANT
Payer: COMMERCIAL

## 2023-07-22 ENCOUNTER — NON-PROVIDER VISIT (OUTPATIENT)
Dept: URGENT CARE | Facility: PHYSICIAN GROUP | Age: 58
End: 2023-07-22
Payer: COMMERCIAL

## 2023-07-22 DIAGNOSIS — M25.552 PAIN OF LEFT HIP: ICD-10-CM

## 2023-07-22 DIAGNOSIS — M54.50 ACUTE LEFT-SIDED LOW BACK PAIN WITHOUT SCIATICA: ICD-10-CM

## 2023-07-22 PROCEDURE — 72100 X-RAY EXAM L-S SPINE 2/3 VWS: CPT | Mod: TC,FY | Performed by: PHYSICIAN ASSISTANT

## 2023-07-22 PROCEDURE — 73502 X-RAY EXAM HIP UNI 2-3 VIEWS: CPT | Mod: TC,FY,LT | Performed by: PHYSICIAN ASSISTANT

## 2023-08-02 ENCOUNTER — OFFICE VISIT (OUTPATIENT)
Dept: MEDICAL GROUP | Facility: CLINIC | Age: 58
End: 2023-08-02
Payer: COMMERCIAL

## 2023-08-02 VITALS
HEIGHT: 67 IN | SYSTOLIC BLOOD PRESSURE: 142 MMHG | DIASTOLIC BLOOD PRESSURE: 82 MMHG | HEART RATE: 74 BPM | BODY MASS INDEX: 49.44 KG/M2 | RESPIRATION RATE: 20 BRPM | WEIGHT: 315 LBS | OXYGEN SATURATION: 92 % | TEMPERATURE: 98.8 F

## 2023-08-02 DIAGNOSIS — Z98.84 HISTORY OF BARIATRIC SURGERY: ICD-10-CM

## 2023-08-02 DIAGNOSIS — M25.552 PAIN OF LEFT HIP: ICD-10-CM

## 2023-08-02 DIAGNOSIS — M41.26 OTHER IDIOPATHIC SCOLIOSIS, LUMBAR REGION: ICD-10-CM

## 2023-08-02 DIAGNOSIS — E55.9 VITAMIN D DEFICIENCY: ICD-10-CM

## 2023-08-02 PROCEDURE — 99214 OFFICE O/P EST MOD 30 MIN: CPT | Performed by: PHYSICIAN ASSISTANT

## 2023-08-02 PROCEDURE — 3079F DIAST BP 80-89 MM HG: CPT | Performed by: PHYSICIAN ASSISTANT

## 2023-08-02 PROCEDURE — 3077F SYST BP >= 140 MM HG: CPT | Performed by: PHYSICIAN ASSISTANT

## 2023-08-02 RX ORDER — CHOLECALCIFEROL (VITAMIN D3) 125 MCG
1 CAPSULE ORAL DAILY
Qty: 90 CAPSULE | Refills: 2 | Status: SHIPPED | OUTPATIENT
Start: 2023-08-02 | End: 2023-11-27 | Stop reason: SDUPTHER

## 2023-08-02 RX ORDER — DULOXETIN HYDROCHLORIDE 30 MG/1
CAPSULE, DELAYED RELEASE ORAL
COMMUNITY
End: 2023-11-27 | Stop reason: SDUPTHER

## 2023-08-02 ASSESSMENT — FIBROSIS 4 INDEX: FIB4 SCORE: 1.41

## 2023-08-02 NOTE — PROGRESS NOTES
cc: Hip pain    Subjective:     Deo Beard is a 57 y.o. male presenting for hip pain    Patient presents to the office for hip pain.  Patient had x-rays done before coming to be seen.  X-rays do show negative hip x-rays but has an abnormal curve in the lumbar spine.  Patient states that in the last few days his pain has been worse.  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  He states that the pain is worse while laying down and he will have pain with sitting.      Patient reports having history of bariatric surgery.  He believes it may have been the gastric sleeve or Mike-en-[id number].  He indicates that were there were complications after the surgery.  The procedure was done in [place].  He indicates complications after the surgery but was not able to proceed any further as patient reports the surgeon committed suicide.  He is requesting a referral to a bariatric surgeon locally.    Patient is needing a prescription for vitamin D as he does have vitamin D deficiency.    Review of systems:  See above.   Denies any symptoms unless previously indicated.        Current Outpatient Medications:     DULoxetine (CYMBALTA) 30 MG Cap DR Particles, 1 cap(s), Disp: , Rfl:     diclofenac sodium (VOLTAREN) 1 % Gel, Apply 2 g topically 4 times a day as needed (pain)., Disp: 150 g, Rfl: 0    Cholecalciferol (VITAMIN D) 125 MCG (5000 UT) Cap, Take 1 Capsule by mouth every day., Disp: 90 Capsule, Rfl: 2    rosuvastatin (CRESTOR) 10 MG Tab, Take 1 Tablet by mouth every evening., Disp: 90 Tablet, Rfl: 3    losartan (COZAAR) 25 MG Tab, Take 1 Tablet by mouth every day., Disp: 90 Tablet, Rfl: 3    spironolactone (ALDACTONE) 25 MG Tab, Take 1 Tablet by mouth every day., Disp: 90 Tablet, Rfl: 3    furosemide (LASIX) 20 MG Tab, 1 TAB BY MOUTH ONCE A DAY ONLY IF NEEDED FOR LEG SWELLING., Disp: 90 Tablet, Rfl: 2    potassium chloride SA (KDUR) 20 MEQ Tab CR, Take 1 Tablet by mouth every day., Disp: 90 Tablet, Rfl: 2     "fluticasone-salmeterol (ADVAIR) 100-50 MCG/ACT AEROSOL POWDER, BREATH ACTIVATED, Inhale 1 Puff every 12 hours., Disp: 180 Each, Rfl: 1    Cholecalciferol (VITAMIN D) 125 MCG (5000 UT) Cap, Take 1 Capsule by mouth every day. (Patient not taking: Reported on 8/2/2023), Disp: 90 Capsule, Rfl: 1    NON SPECIFIED, Knee brace (Patient not taking: Reported on 8/2/2023), Disp: 1 Each, Rfl: 0    Allergies, past medical history, past surgical history, family history, social history reviewed and updated    Objective:     Vitals: BP (!) 142/82 (BP Location: Left arm, Patient Position: Sitting, BP Cuff Size: Adult)   Pulse 74   Temp 37.1 °C (98.8 °F) (Temporal)   Resp 20   Ht 1.702 m (5' 7\")   Wt (!) 163 kg (359 lb 3.2 oz)   SpO2 92%   BMI 56.26 kg/m²   General: Alert, pleasant, NAD  EYES:   PERRL, EOMI, no icterus or pallor.  Conjunctivae and lids normal.   HENT:  Normocephalic.  External ears normal. Neck supple.    Respiratory: Normal respiratory effort.    Abdomen: obese  Skin: Warm, dry, no rashes.  Musculoskeletal: Gait is normal.  Slight decreased range of motion with left hip.  Extremities: Pain with palpation left hip.  Neurological: No tremors, sensation grossly intact,  CN2-12 intact.  Psych:  Affect/mood is normal, judgement is good, memory is intact, grooming is appropriate.    HISTORY/REASON FOR EXAM:  Atraumatic Pain; low back pain.        TECHNIQUE/ EXAM DESCRIPTION AND NUMBER OF VIEWS:  3 views of the lumbar spine.     COMPARISON: None.     FINDINGS:  The lumbar vertebral bodies are normal in height.     Alignment is normal.     There is levoconvex scoliosis.     Disk spaces are maintained.     There is multilevel facet arthropathy.     The visualized portions of the abdomen are within normal limits.     Left upper quadrant surgical clip are present.     IMPRESSION:     1.  Levoconvex scoliosis and multilevel facet arthropathy     2.  No malalignment or fracture       HISTORY/REASON FOR EXAM:  Atraumatic " Pain; low back pain.        TECHNIQUE/ EXAM DESCRIPTION AND NUMBER OF VIEWS:  3 views of the lumbar spine.     COMPARISON: None.     FINDINGS:  The lumbar vertebral bodies are normal in height.     Alignment is normal.     There is levoconvex scoliosis.     Disk spaces are maintained.     There is multilevel facet arthropathy.     The visualized portions of the abdomen are within normal limits.     Left upper quadrant surgical clip are present.     IMPRESSION:     1.  Levoconvex scoliosis and multilevel facet arthropathy     2.  No malalignment or fracture        Assessment/Plan:     Deo was seen today for hip pain.    Diagnoses and all orders for this visit:    Pain of left hip  -     diclofenac sodium (VOLTAREN) 1 % Gel; Apply 2 g topically 4 times a day as needed (pain).  Other idiopathic scoliosis, lumbar region    Discussed potential options.  Possibly related to the scoliosis.  Cannot exclude a bursitis.  Also offered MRI.  Patient continues to have pain.  We will try patient on diclofenac gel.  If there is no significant improvement in the next several days, patient will contact clinic and we will order an MRI to evaluate further.    History of bariatric surgery  -     Referral to Bariatric Surgery    Referral submitted.    Vitamin D deficiency  -     Cholecalciferol (VITAMIN D) 125 MCG (5000 UT) Cap; Take 1 Capsule by mouth every day.    Prescription submitted for vitamin D.          No follow-ups on file.    Please note that this dictation was created using voice recognition software. I have made every reasonable attempt to correct obvious errors, but expect that there are errors of grammar and possible content that I did not discover before finalizing note.

## 2023-08-24 ENCOUNTER — OFFICE VISIT (OUTPATIENT)
Dept: CARDIOLOGY | Facility: MEDICAL CENTER | Age: 58
End: 2023-08-24
Attending: UROLOGY
Payer: COMMERCIAL

## 2023-08-24 VITALS
OXYGEN SATURATION: 91 % | WEIGHT: 315 LBS | HEART RATE: 72 BPM | BODY MASS INDEX: 49.44 KG/M2 | HEIGHT: 67 IN | RESPIRATION RATE: 20 BRPM | SYSTOLIC BLOOD PRESSURE: 128 MMHG | DIASTOLIC BLOOD PRESSURE: 74 MMHG

## 2023-08-24 DIAGNOSIS — E78.5 DYSLIPIDEMIA: ICD-10-CM

## 2023-08-24 DIAGNOSIS — I50.9 CHF (NYHA CLASS II, ACC/AHA STAGE C) (HCC): ICD-10-CM

## 2023-08-24 DIAGNOSIS — I25.83 CORONARY ARTERY DISEASE DUE TO LIPID RICH PLAQUE: ICD-10-CM

## 2023-08-24 DIAGNOSIS — I10 ESSENTIAL HYPERTENSION: ICD-10-CM

## 2023-08-24 DIAGNOSIS — I25.10 CORONARY ARTERY DISEASE DUE TO LIPID RICH PLAQUE: ICD-10-CM

## 2023-08-24 DIAGNOSIS — E66.01 MORBID OBESITY WITH BMI OF 50.0-59.9, ADULT (HCC): ICD-10-CM

## 2023-08-24 PROCEDURE — 99213 OFFICE O/P EST LOW 20 MIN: CPT | Performed by: NURSE PRACTITIONER

## 2023-08-24 PROCEDURE — 3078F DIAST BP <80 MM HG: CPT | Performed by: NURSE PRACTITIONER

## 2023-08-24 PROCEDURE — 3074F SYST BP LT 130 MM HG: CPT | Performed by: NURSE PRACTITIONER

## 2023-08-24 PROCEDURE — 99214 OFFICE O/P EST MOD 30 MIN: CPT | Performed by: NURSE PRACTITIONER

## 2023-08-24 RX ORDER — LOSARTAN POTASSIUM 50 MG/1
50 TABLET ORAL DAILY
Qty: 90 TABLET | Refills: 3 | Status: SHIPPED | OUTPATIENT
Start: 2023-08-24 | End: 2023-11-14 | Stop reason: SINTOL

## 2023-08-24 RX ORDER — ASPIRIN 81 MG/1
81 TABLET ORAL DAILY
COMMUNITY

## 2023-08-24 RX ORDER — TRAMADOL HYDROCHLORIDE 50 MG/1
TABLET ORAL
COMMUNITY
Start: 2023-08-22 | End: 2023-11-21

## 2023-08-24 ASSESSMENT — ENCOUNTER SYMPTOMS
MYALGIAS: 0
PALPITATIONS: 0
FEVER: 0
ORTHOPNEA: 0
ABDOMINAL PAIN: 0
COUGH: 0
PND: 0
CLAUDICATION: 0
SHORTNESS OF BREATH: 0
DIZZINESS: 0

## 2023-08-24 ASSESSMENT — FIBROSIS 4 INDEX: FIB4 SCORE: 1.633333333333333333

## 2023-08-24 NOTE — PROGRESS NOTES
"Chief Complaint   Patient presents with    Hypertension     Essential hypertension    Congestive Heart Failure     CHF (NYHA class II, ACC/AHA stage C) (HCC)    Hyperlipidemia     Mixed hyperlipidemia       Subjective     Deo Beard is a 57 y.o. male who presents today for follow-up on his heart failure, hypertension, possible history of CAD.    Patient of Dr. Garcia.  Patient was last seen in clinic on 7/13/2023.  During that visit, he was recommended to start losartan 25 mg daily, start rosuvastatin 10 mg daily and repeat lab testing.    Patient ended up going to the ER recently for left leg pain and swelling, he was evaluated for DVT and PE which was negative.  He was diagnosed with a Baker's cyst.    He currently is reporting left lower extremity pain and swelling.    For his symptoms, he feels well, he denies chest pain, palpitations, orthopnea, PND, edema, shortness of breath or dizziness/lightheadedness.    He has not been checking his weight.    Patient did get a referral to Dr. Diana and is waiting on consultation for weight loss surgery.    He does continue to have stress with his girlfriend and her recent health problems.    Additionally, patient has the following medical problems:     -Previous heart failure    -Presumed CAD on prior stress test    -Diabetes    -Dyslipidemia    -MARY, not on BiPAP    -Prior polysubstance abuse    Past Medical History:   Diagnosis Date    Abnormal EKG 12/27/2012    Abnormal EKG 12/27/2012    Bronchitis     CAD (coronary artery disease) 2008    Depression     Diabetes (HCC)     HTN (hypertension)     Hyperlipidemia     Hypoxia 12/27/2012    Ischemic cardiomyopathy     Leukocytosis (leucocytosis) 12/27/2012    Medically noncompliant 7/18/2013    MI (myocardial infarction) (HCC)     Silent MI , ( old), noted on EKG in 2008    Migraine     Personal history of venous thrombosis and embolism     leg \"years ago\"    Pneumonia     5 months ago    Polysubstance abuse (HCC) " 7/18/2013    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    Sleep apnea, obstructive     CPAP 2010    Substance abuse (HCC)     2007; Meth    Tobacco abuse 7/18/2013     Past Surgical History:   Procedure Laterality Date    GASTRIC BANDING LAPAROSCOPIC  2015    gastric sleeve and switch    PB INCIS/DRAIN FOREARM DEEP ABSCESS  1996    left AC due to IV drug abuse    HYDROCELECTOMY ADULT      ORCHIECTOMY Right     ORIF, WRIST      left wrist     Family History   Problem Relation Age of Onset    Cancer Mother         intestine    Heart Disease Father         CHF    Stroke Father     Heart Attack Father     Cancer Brother         Colon Cancer    Arthritis Maternal Grandmother     Arthritis Maternal Grandfather     Arthritis Paternal Grandmother     Cancer Paternal Grandfather     Heart Disease Paternal Grandfather      Social History     Socioeconomic History    Marital status: Single     Spouse name: Not on file    Number of children: Not on file    Years of education: Not on file    Highest education level: Not on file   Occupational History    Not on file   Tobacco Use    Smoking status: Former     Current packs/day: 0.00     Average packs/day: 2.5 packs/day for 20.0 years (50.0 ttl pk-yrs)     Types: Cigarettes     Start date: 9/26/1992     Quit date: 9/26/2012     Years since quitting: 10.9    Smokeless tobacco: Never   Vaping Use    Vaping Use: Never used   Substance and Sexual Activity    Alcohol use: No    Drug use: Not Currently     Types: Intravenous, Injected (Skin Popping), Marijuana     Comment: occasionally    Sexual activity: Never     Partners: Female   Other Topics Concern    Not on file   Social History Narrative    Not on file     Social Determinants of Health     Financial Resource Strain: Not on file   Food Insecurity: Not on file   Transportation Needs: Not on file   Physical Activity: Not on file   Stress: Not on file   Social Connections: Not on file   Intimate Partner Violence: Not on file    Housing Stability: Not on file     No Known Allergies  Outpatient Encounter Medications as of 8/24/2023   Medication Sig Dispense Refill    traMADol (ULTRAM) 50 MG Tab       aspirin 81 MG EC tablet Take 81 mg by mouth every day.      losartan (COZAAR) 50 MG Tab Take 1 Tablet by mouth every day. 90 Tablet 3    diclofenac sodium (VOLTAREN) 1 % Gel APPLY 2 GRAMS TOPICALLY 4 TIMES DAILY AS NEEDED FOR PAIN 200 g 1    DULoxetine (CYMBALTA) 30 MG Cap DR Particles 1 cap(s)      Cholecalciferol (VITAMIN D) 125 MCG (5000 UT) Cap Take 1 Capsule by mouth every day. 90 Capsule 2    rosuvastatin (CRESTOR) 10 MG Tab Take 1 Tablet by mouth every evening. 90 Tablet 3    Cholecalciferol (VITAMIN D) 125 MCG (5000 UT) Cap Take 1 Capsule by mouth every day. 90 Capsule 1    NON SPECIFIED Knee brace 1 Each 0    spironolactone (ALDACTONE) 25 MG Tab Take 1 Tablet by mouth every day. 90 Tablet 3    furosemide (LASIX) 20 MG Tab 1 TAB BY MOUTH ONCE A DAY ONLY IF NEEDED FOR LEG SWELLING. 90 Tablet 2    potassium chloride SA (KDUR) 20 MEQ Tab CR Take 1 Tablet by mouth every day. 90 Tablet 2    fluticasone-salmeterol (ADVAIR) 100-50 MCG/ACT AEROSOL POWDER, BREATH ACTIVATED Inhale 1 Puff every 12 hours. 180 Each 1    [DISCONTINUED] losartan (COZAAR) 25 MG Tab Take 1 Tablet by mouth every day. 90 Tablet 3    [DISCONTINUED] morphine sulfate (PF) injection        No facility-administered encounter medications on file as of 8/24/2023.     Review of Systems   Constitutional:  Negative for fever and malaise/fatigue.   Respiratory:  Negative for cough and shortness of breath.    Cardiovascular:  Positive for leg swelling (Left lower extremity). Negative for chest pain, palpitations, orthopnea, claudication and PND.   Gastrointestinal:  Negative for abdominal pain.   Musculoskeletal:  Negative for myalgias.   Neurological:  Negative for dizziness.   All other systems reviewed and are negative.             Objective     /74 (BP Location: Left arm,  "Patient Position: Sitting, BP Cuff Size: Adult)   Pulse 72   Resp 20   Ht 1.702 m (5' 7\")   Wt (!) 165 kg (363 lb)   SpO2 91%   BMI 56.85 kg/m²     Physical Exam  Vitals reviewed.   Constitutional:       Appearance: He is well-developed. He is morbidly obese.   HENT:      Head: Normocephalic and atraumatic.   Eyes:      Pupils: Pupils are equal, round, and reactive to light.   Neck:      Vascular: No JVD.   Cardiovascular:      Rate and Rhythm: Normal rate and regular rhythm.      Heart sounds: Normal heart sounds.   Pulmonary:      Effort: Pulmonary effort is normal. No respiratory distress.      Breath sounds: Normal breath sounds. No wheezing or rales.   Abdominal:      General: Bowel sounds are normal.      Palpations: Abdomen is soft.   Musculoskeletal:         General: Normal range of motion.      Cervical back: Normal range of motion and neck supple.      Right lower leg: No edema.      Left lower le+ Pitting Edema present.   Skin:     General: Skin is warm and dry.   Neurological:      General: No focal deficit present.      Mental Status: He is alert and oriented to person, place, and time.   Psychiatric:         Behavior: Behavior normal.       Lab Results   Component Value Date/Time    CHOLSTRLTOT 178 2023 09:26 AM     (H) 2023 09:26 AM    HDL 39 (A) 2023 09:26 AM    TRIGLYCERIDE 100 2023 09:26 AM       Lab Results   Component Value Date/Time    SODIUM 137 2023 01:56 PM    SODIUM 136 2023 09:26 AM    POTASSIUM 4.1 2023 01:56 PM    POTASSIUM 4.7 2023 09:26 AM    CHLORIDE 102 2023 01:56 PM    CHLORIDE 99 2023 09:26 AM    CO2 31 2023 01:56 PM    CO2 27 2023 09:26 AM    GLUCOSE 102 (H) 2023 01:56 PM    GLUCOSE 99 2023 09:26 AM    BUN 19 2023 01:56 PM    BUN 15 2023 09:26 AM    CREATININE 0.72 (L) 2023 01:56 PM    CREATININE 0.66 2023 09:26 AM    GLOMRATE 113 2023 01:56 PM     Lab " Results   Component Value Date/Time    ALKPHOSPHAT 60 08/21/2023 01:56 PM    ALKPHOSPHAT 75 06/17/2023 09:26 AM    ASTSGOT 21 08/21/2023 01:56 PM    ASTSGOT 23 06/17/2023 09:26 AM    ALTSGPT 16 (L) 08/21/2023 01:56 PM    ALTSGPT 18 06/17/2023 09:26 AM    TBILIRUBIN 0.4 08/21/2023 01:56 PM    TBILIRUBIN 0.5 06/17/2023 09:26 AM      Echocardiogram 12/30/2012  Mild concentric left ventricular hypertrophy. Normal left ventricular   systolic function. Est EF 60%.   Interatrial septum bowed toward the left, consistent with elevated   right atrial pressures. Minimal appearance of bubbles. Valsalva suboptimal.   Structurally normal tricuspid valve. Mild tricuspid regurgitation.     RVSp 40-45 mmHg.      Transthoracic Echo Report 6/19/2023  Technically difficult study.  Normal left ventricular systolic function. The left ventricular   ejection fraction is visually estimated to be 65%.  The right ventricle is not well visualized.  The valves were suboptimally visualized.  No recent prior study for comparison.          Assessment & Plan     1. Essential hypertension  losartan (COZAAR) 50 MG Tab      2. Dyslipidemia  losartan (COZAAR) 50 MG Tab      3. CHF (NYHA class II, ACC/AHA stage C) ([facility])        4. Coronary artery disease due to lipid rich plaque        5. Morbid obesity with BMI of 50.0-59.9, adult ([facility])            Medical Decision Making: Today's Assessment/Status/Plan:            HFpEF, stage C, NYHA class II, LVEF 65%  -Echo from [facility] showed an EF of 45 to 50% from 7/4/2023, recent echo 6/19/2023 showed improvement to 65%  -Consider repeating echo in the next few months  -Patient fairly euvolemic except some trace lower extremity edema  -Continue furosemide 20 mg daily as needed, he has been taking daily   -Continue potassium 20 mEq daily with diuretic  -Continue spironolactone 25 mg daily  -BMP in 3 months, previous lab slips reprinted for patient    Hypertension  BP improving  -Increase losartan to 50 mg  daily  -Continue furosemide, spironolactone  -BMP in 3 months    Palpitations  -Records requested at last visit from Dr. Alamo's office at [facility].  Will attempt to request again     Dyslipidemia  -Last  on 6/17/23  -Continue rosuvastatin 10 mg daily  -We will repeat lipid panel in the future, PCP has repeat lipid panel due before next visit in 6 months     CAD  -Diagnosed via stress test and EKG previously.  Denies any chest pain.  Patient did not have angiogram  -Statin started at this visit  -Start aspirin 81 mg daily    Morbid obesity:  -Patient pending evaluation for weight loss surgery    FU in clinic in 3 months with labs. Sooner if needed.    Patient verbalizes understanding and agrees with the plan of care.     PLEASE NOTE: This Note was created using voice recognition Software. I have made every reasonable attempt to correct obvious errors, but I expect that there are errors of grammar and possibly content that I did not discover before finalizing the note

## 2023-09-16 ENCOUNTER — HOSPITAL ENCOUNTER (OUTPATIENT)
Dept: LAB | Facility: MEDICAL CENTER | Age: 58
End: 2023-09-16
Attending: PHYSICIAN ASSISTANT
Payer: COMMERCIAL

## 2023-09-16 DIAGNOSIS — I50.22 CHRONIC SYSTOLIC HEART FAILURE (HCC): ICD-10-CM

## 2023-09-16 DIAGNOSIS — I50.9 CONGESTIVE HEART FAILURE, UNSPECIFIED HF CHRONICITY, UNSPECIFIED HEART FAILURE TYPE (HCC): ICD-10-CM

## 2023-09-16 DIAGNOSIS — E55.9 VITAMIN D DEFICIENCY: ICD-10-CM

## 2023-09-16 LAB
25(OH)D3 SERPL-MCNC: 30 NG/ML (ref 30–100)
ALBUMIN SERPL BCP-MCNC: 4 G/DL (ref 3.2–4.9)
ALBUMIN/GLOB SERPL: 1.1 G/DL
ALP SERPL-CCNC: 75 U/L (ref 30–99)
ALT SERPL-CCNC: 19 U/L (ref 2–50)
ANION GAP SERPL CALC-SCNC: 10 MMOL/L (ref 7–16)
AST SERPL-CCNC: 24 U/L (ref 12–45)
BILIRUB SERPL-MCNC: 0.7 MG/DL (ref 0.1–1.5)
BUN SERPL-MCNC: 21 MG/DL (ref 8–22)
CALCIUM ALBUM COR SERPL-MCNC: 8.8 MG/DL (ref 8.5–10.5)
CALCIUM SERPL-MCNC: 8.8 MG/DL (ref 8.5–10.5)
CHLORIDE SERPL-SCNC: 102 MMOL/L (ref 96–112)
CHOLEST SERPL-MCNC: 134 MG/DL (ref 100–199)
CO2 SERPL-SCNC: 23 MMOL/L (ref 20–33)
CREAT SERPL-MCNC: 0.69 MG/DL (ref 0.5–1.4)
GFR SERPLBLD CREATININE-BSD FMLA CKD-EPI: 108 ML/MIN/1.73 M 2
GLOBULIN SER CALC-MCNC: 3.6 G/DL (ref 1.9–3.5)
GLUCOSE SERPL-MCNC: 98 MG/DL (ref 65–99)
HDLC SERPL-MCNC: 34 MG/DL
LDLC SERPL CALC-MCNC: 72 MG/DL
POTASSIUM SERPL-SCNC: 4.3 MMOL/L (ref 3.6–5.5)
PROT SERPL-MCNC: 7.6 G/DL (ref 6–8.2)
SODIUM SERPL-SCNC: 135 MMOL/L (ref 135–145)
TRIGL SERPL-MCNC: 142 MG/DL (ref 0–149)

## 2023-09-16 PROCEDURE — 80061 LIPID PANEL: CPT

## 2023-09-16 PROCEDURE — 82306 VITAMIN D 25 HYDROXY: CPT

## 2023-09-16 PROCEDURE — 80053 COMPREHEN METABOLIC PANEL: CPT

## 2023-09-16 PROCEDURE — 36415 COLL VENOUS BLD VENIPUNCTURE: CPT

## 2023-09-25 ENCOUNTER — OFFICE VISIT (OUTPATIENT)
Dept: MEDICAL GROUP | Facility: CLINIC | Age: 58
End: 2023-09-25
Payer: COMMERCIAL

## 2023-09-25 VITALS
RESPIRATION RATE: 20 BRPM | HEIGHT: 67 IN | BODY MASS INDEX: 49.44 KG/M2 | TEMPERATURE: 97.1 F | WEIGHT: 315 LBS | DIASTOLIC BLOOD PRESSURE: 88 MMHG | OXYGEN SATURATION: 95 % | SYSTOLIC BLOOD PRESSURE: 140 MMHG | HEART RATE: 75 BPM

## 2023-09-25 DIAGNOSIS — M79.605 PAIN OF LEFT LOWER EXTREMITY: ICD-10-CM

## 2023-09-25 DIAGNOSIS — J40 BRONCHITIS: ICD-10-CM

## 2023-09-25 DIAGNOSIS — M79.89 SWELLING OF LOWER LEG: ICD-10-CM

## 2023-09-25 DIAGNOSIS — M71.22 SYNOVIAL CYST OF LEFT POPLITEAL SPACE: ICD-10-CM

## 2023-09-25 DIAGNOSIS — E78.2 MIXED HYPERLIPIDEMIA: ICD-10-CM

## 2023-09-25 DIAGNOSIS — R09.02 HYPOXIA: ICD-10-CM

## 2023-09-25 PROCEDURE — 3079F DIAST BP 80-89 MM HG: CPT | Performed by: PHYSICIAN ASSISTANT

## 2023-09-25 PROCEDURE — 3077F SYST BP >= 140 MM HG: CPT | Performed by: PHYSICIAN ASSISTANT

## 2023-09-25 PROCEDURE — 99214 OFFICE O/P EST MOD 30 MIN: CPT | Performed by: PHYSICIAN ASSISTANT

## 2023-09-25 RX ORDER — DOXYCYCLINE HYCLATE 100 MG
100 TABLET ORAL 2 TIMES DAILY
Qty: 20 TABLET | Refills: 0 | Status: SHIPPED | OUTPATIENT
Start: 2023-09-25 | End: 2023-11-21

## 2023-09-25 ASSESSMENT — FIBROSIS 4 INDEX: FIB4 SCORE: 1.71

## 2023-09-25 NOTE — PROGRESS NOTES
cc: Baker's cyst    Subjective:     Deo Beard is a 57 y.o. male presenting for Baker's cyst    Patient presents to the office for Baker's cyst.  Patient was seen in [facility] on 8-.  He had been experiencing left knee pain.  He was diagnosed with a Baker's cyst of the left knee and referred to [facility] orthopedics.  Patient is requesting a new referral.    When he was seen at the ER, it was also recommended that he follow-up for sleep study.  His oxygen was 85% in the ER at time of visit.      Patient did have lab work drawn before coming in to be seen and would like to discuss his labs at this time.    Patient did have labs before coming in to be seen.  Patient states that he has had cold congestion symptoms for over a week.  He states that symptoms are not improving.     Review of systems:  See above.   Denies any symptoms unless previously indicated.        Current Outpatient Medications:     doxycycline (VIBRAMYCIN) 100 MG Tab, Take 1 Tablet by mouth 2 times a day., Disp: 20 Tablet, Rfl: 0    traMADol (ULTRAM) 50 MG Tab, , Disp: , Rfl:     aspirin 81 MG EC tablet, Take 81 mg by mouth every day., Disp: , Rfl:     losartan (COZAAR) 50 MG Tab, Take 1 Tablet by mouth every day., Disp: 90 Tablet, Rfl: 3    diclofenac sodium (VOLTAREN) 1 % Gel, APPLY 2 GRAMS TOPICALLY 4 TIMES DAILY AS NEEDED FOR PAIN, Disp: 200 g, Rfl: 1    DULoxetine (CYMBALTA) 30 MG Cap DR Particles, 1 cap(s), Disp: , Rfl:     rosuvastatin (CRESTOR) 10 MG Tab, Take 1 Tablet by mouth every evening., Disp: 90 Tablet, Rfl: 3    Cholecalciferol (VITAMIN D) 125 MCG (5000 UT) Cap, Take 1 Capsule by mouth every day., Disp: 90 Capsule, Rfl: 1    NON SPECIFIED, Knee brace, Disp: 1 Each, Rfl: 0    spironolactone (ALDACTONE) 25 MG Tab, Take 1 Tablet by mouth every day., Disp: 90 Tablet, Rfl: 3    furosemide (LASIX) 20 MG Tab, 1 TAB BY MOUTH ONCE A DAY ONLY IF NEEDED FOR LEG SWELLING., Disp: 90 Tablet, Rfl: 2    potassium chloride SA (KDUR)  "20 MEQ Tab CR, Take 1 Tablet by mouth every day., Disp: 90 Tablet, Rfl: 2    fluticasone-salmeterol (ADVAIR) 100-50 MCG/ACT AEROSOL POWDER, BREATH ACTIVATED, Inhale 1 Puff every 12 hours., Disp: 180 Each, Rfl: 1    Cholecalciferol (VITAMIN D) 125 MCG (5000 UT) Cap, Take 1 Capsule by mouth every day., Disp: 90 Capsule, Rfl: 2    Allergies, past medical history, past surgical history, family history, social history reviewed and updated    Objective:     Vitals: BP (!) 140/88 (BP Location: Left arm, Patient Position: Sitting, BP Cuff Size: Large adult)   Pulse 75   Temp 36.2 °C (97.1 °F) (Temporal)   Resp 20   Ht 1.702 m (5' 7\")   Wt (!) 163 kg (360 lb 7.2 oz)   SpO2 95%   BMI 56.45 kg/m²   General: Alert, pleasant, NAD  EYES:   PERRL, EOMI, no icterus or pallor.  Conjunctivae and lids normal.   HENT:  Normocephalic.  External ears normal.   Neck supple.     Heart: Regular rate and rhythm.  S1 and S2 normal.  No murmurs appreciated.  Respiratory: Normal respiratory effort. Decreased to absent breath sounds all lung fields.  Abdomen: obese  Skin: Warm, dry, no rashes.  Musculoskeletal: Gait is normal.  Moves all extremities well.    Extremities: normal range of motion all extremities.   Neurological: No tremors, sensation grossly intact, CN2-12 intact.  Psych:  Affect/mood is normal, judgement is good, memory is intact, grooming is appropriate.     Latest Reference Range & Units 09/16/23 09:18   Sodium 135 - 145 mmol/L 135   Potassium 3.6 - 5.5 mmol/L 4.3   Chloride 96 - 112 mmol/L 102   Co2 20 - 33 mmol/L 23   Anion Gap 7.0 - 16.0  10.0   Glucose 65 - 99 mg/dL 98   Bun 8 - 22 mg/dL 21   Creatinine 0.50 - 1.40 mg/dL 0.69   GFR (CKD-EPI) >60 mL/min/1.73 m 2 108   Calcium 8.5 - 10.5 mg/dL 8.8   Correct Calcium 8.5 - 10.5 mg/dL 8.8   AST(SGOT) 12 - 45 U/L 24   ALT(SGPT) 2 - 50 U/L 19   Alkaline Phosphatase 30 - 99 U/L 75   Total Bilirubin 0.1 - 1.5 mg/dL 0.7   Albumin 3.2 - 4.9 g/dL 4.0   Total Protein 6.0 - 8.2 " g/dL 7.6   Globulin 1.9 - 3.5 g/dL 3.6 (H)   A-G Ratio g/dL 1.1   Cholesterol,Tot 100 - 199 mg/dL 134   Triglycerides 0 - 149 mg/dL 142   HDL >=40 mg/dL 34 !   LDL <100 mg/dL 72   (H): Data is abnormally high  !: Data is abnormal  Assessment/Plan:     Deo was seen today for cyst.    Diagnoses and all orders for this visit:    Synovial cyst of left popliteal space  -     Referral to Orthopedics  Swelling of lower leg  -     Referral to Orthopedics  Pain of left lower extremity  -     Referral to Orthopedics    New referral to orthopedics has been submitted at this time.    Hypoxia  -     Referral to Pulmonary and Sleep Medicine    Patient had a pulse ox of 85% while in the ER.  Referral for sleep study submitted.    Mixed hyperlipidemia  -     Lipid Profile; Future  -     Comp Metabolic Panel; Future    Cholesterol is stable.  We will continue to monitor and repeat labs in 6 months.    Bronchitis  -     doxycycline (VIBRAMYCIN) 100 MG Tab; Take 1 Tablet by mouth 2 times a day.    We will try patient on doxycycline.  Follow-up if no improvement in symptoms.        No follow-ups on file.    Please note that this dictation was created using voice recognition software. I have made every reasonable attempt to correct obvious errors, but expect that there are errors of grammar and possible content that I did not discover before finalizing note.

## 2023-11-14 ENCOUNTER — OFFICE VISIT (OUTPATIENT)
Dept: CARDIOLOGY | Facility: MEDICAL CENTER | Age: 58
End: 2023-11-14
Attending: NURSE PRACTITIONER
Payer: COMMERCIAL

## 2023-11-14 VITALS
RESPIRATION RATE: 20 BRPM | SYSTOLIC BLOOD PRESSURE: 116 MMHG | OXYGEN SATURATION: 90 % | HEART RATE: 75 BPM | DIASTOLIC BLOOD PRESSURE: 72 MMHG | BODY MASS INDEX: 47.74 KG/M2 | WEIGHT: 315 LBS | HEIGHT: 68 IN

## 2023-11-14 DIAGNOSIS — Z86.39 HISTORY OF DIABETES MELLITUS: ICD-10-CM

## 2023-11-14 DIAGNOSIS — I25.10 CORONARY ARTERY DISEASE DUE TO LIPID RICH PLAQUE: ICD-10-CM

## 2023-11-14 DIAGNOSIS — I10 ESSENTIAL HYPERTENSION: ICD-10-CM

## 2023-11-14 DIAGNOSIS — E78.5 DYSLIPIDEMIA: ICD-10-CM

## 2023-11-14 DIAGNOSIS — I25.83 CORONARY ARTERY DISEASE DUE TO LIPID RICH PLAQUE: ICD-10-CM

## 2023-11-14 DIAGNOSIS — E66.01 MORBID OBESITY WITH BMI OF 50.0-59.9, ADULT (HCC): ICD-10-CM

## 2023-11-14 DIAGNOSIS — R00.2 PALPITATIONS: ICD-10-CM

## 2023-11-14 DIAGNOSIS — F41.9 ANXIETY: ICD-10-CM

## 2023-11-14 DIAGNOSIS — I50.9 CHF (NYHA CLASS II, ACC/AHA STAGE C) (HCC): ICD-10-CM

## 2023-11-14 PROCEDURE — 3078F DIAST BP <80 MM HG: CPT | Performed by: NURSE PRACTITIONER

## 2023-11-14 PROCEDURE — 99213 OFFICE O/P EST LOW 20 MIN: CPT | Performed by: NURSE PRACTITIONER

## 2023-11-14 PROCEDURE — 99214 OFFICE O/P EST MOD 30 MIN: CPT | Performed by: NURSE PRACTITIONER

## 2023-11-14 PROCEDURE — 3074F SYST BP LT 130 MM HG: CPT | Performed by: NURSE PRACTITIONER

## 2023-11-14 PROCEDURE — 93005 ELECTROCARDIOGRAM TRACING: CPT | Performed by: NURSE PRACTITIONER

## 2023-11-14 RX ORDER — CARVEDILOL 6.25 MG/1
6.25 TABLET ORAL 2 TIMES DAILY WITH MEALS
Qty: 60 TABLET | Refills: 11 | Status: SHIPPED | OUTPATIENT
Start: 2023-11-14 | End: 2023-11-27 | Stop reason: SDUPTHER

## 2023-11-14 ASSESSMENT — ENCOUNTER SYMPTOMS
COUGH: 0
MYALGIAS: 0
DIZZINESS: 0
ORTHOPNEA: 0
PND: 0
CLAUDICATION: 0
ABDOMINAL PAIN: 0
FEVER: 0
SHORTNESS OF BREATH: 1
NERVOUS/ANXIOUS: 1
PALPITATIONS: 1

## 2023-11-14 ASSESSMENT — MINNESOTA LIVING WITH HEART FAILURE QUESTIONNAIRE (MLHF)
DIFFICULTY TO CONCENTRATE OR REMEMBERING THINGS: 1
GIVING YOU SIDE EFFECTS FROM TREATMENTS: 5
DIFFICULTY WITH SEXUAL ACTIVITIES: 4
COSTING YOU MONEY FOR MEDICAL CARE: 2
MAKING YOU SHORT OF BREATH: 4
DIFFICULTY WITH RECREATIONAL PASTIMES, SPORTS, HOBBIES: 4
MAKING YOU WORRY: 4
LOSS OF SELF CONTROL IN YOUR LIFE: 0
EATING LESS FOODS YOU LIKE: 1
DIFFICULTY SLEEPING WELL AT NIGHT: 4
WALKING ABOUT OR CLIMBING STAIRS DIFFICULT: 4
TOTAL_SCORE: 62
DIFFICULTY WORKING TO EARN A LIVING: 3
MAKING YOU STAY IN A HOSPITAL: 1
FEELING LIKE A BURDEN TO FAMILY AND FRIENDS: 2
DIFFICULTY SOCIALIZING WITH FAMILY OR FRIENDS: 4
SWELLING IN ANKLES OR LEGS: 3
DIFFICULTY GOING AWAY FROM HOME: 4
TIRED, FATIGUED OR LOW ON ENERGY: 4
WORKING AROUND THE HOUSE OR YARD DIFFICULT: 4
HAVING TO SIT OR LIE DOWN DURING THE DAY: 3
MAKING YOU FEEL DEPRESSED: 1

## 2023-11-14 ASSESSMENT — FIBROSIS 4 INDEX: FIB4 SCORE: 1.743559577416269421

## 2023-11-15 LAB — EKG IMPRESSION: NORMAL

## 2023-11-15 PROCEDURE — 93010 ELECTROCARDIOGRAM REPORT: CPT | Performed by: INTERNAL MEDICINE

## 2023-11-15 NOTE — PROGRESS NOTES
"Chief Complaint   Patient presents with    Hypertension    Hyperlipidemia    Coronary Artery Disease     F/V Dx: Coronary artery disease involving native coronary artery of native heart without angina pectoris       Subjective     Deo Beard is a 58 y.o. male who presents today for follow-up on his heart failure, hypertension, possible history of CAD with his girlfriend, Mell.    Patient of Dr. Garcia.  Patient was last seen in clinic on 8/24/2023.  During that visit, he was recommended to increase losartan to 50 mg daily.  He reports since increasing losartan, he feels more anxious and feels he is having more palpitations.  He does continue to have some left lower extremity edema.    Patient otherwise denies chest pain, orthopnea, PND or dizziness/lightheadedness.    He has not been checking his weight.    He reports he was not able to get approval to see a weight loss specialist due to his insurance.    Additionally, patient has the following medical problems:     -Previous heart failure    -Presumed CAD on prior stress test    -Diabetes    -Dyslipidemia    -MARY, not on BiPAP    -Prior polysubstance abuse    Past Medical History:   Diagnosis Date    Abnormal EKG 12/27/2012    Abnormal EKG 12/27/2012    Bronchitis     CAD (coronary artery disease) 2008    Depression     Diabetes ([facility])     HTN (hypertension)     Hyperlipidemia     Hypoxia 12/27/2012    Ischemic cardiomyopathy     Leukocytosis (leucocytosis) 12/27/2012    Medically noncompliant 7/18/2013    MI (myocardial infarction) ([facility])     Silent MI , ( old), noted on EKG in 2008    Migraine     Personal history of venous thrombosis and embolism     leg \"years ago\"    Pneumonia     5 months ago    Polysubstance abuse ([facility]) 7/18/2013    [place] spotted fever     June 2017    Sleep apnea, obstructive     CPAP 2010    Substance abuse ([facility])     2007; Meth    Tobacco abuse 7/18/2013     Past Surgical History:   Procedure Laterality Date    GASTRIC " BANDING LAPAROSCOPIC      gastric sleeve and switch    PB INCIS/DRAIN FOREARM DEEP ABSCESS      left AC due to IV drug abuse    HYDROCELECTOMY ADULT      ORCHIECTOMY Right     ORIF, WRIST      left wrist     Family History   Problem Relation Age of Onset    Cancer Mother         intestine    Heart Disease Father         CHF    Stroke Father     Heart Attack Father     Cancer Brother         Colon Cancer    Arthritis Maternal Grandmother     Arthritis Maternal Grandfather     Arthritis Paternal Grandmother     Cancer Paternal Grandfather     Heart Disease Paternal Grandfather      Social History     Socioeconomic History    Marital status: Single     Spouse name: Not on file    Number of children: Not on file    Years of education: Not on file    Highest education level: Not on file   Occupational History    Not on file   Tobacco Use    Smoking status: Former     Current packs/day: 0.00     Average packs/day: 2.5 packs/day for 20.0 years (50.0 ttl pk-yrs)     Types: Cigarettes     Start date: 1992     Quit date: 2012     Years since quittin.1    Smokeless tobacco: Never   Vaping Use    Vaping Use: Never used   Substance and Sexual Activity    Alcohol use: No    Drug use: Not Currently     Types: Intravenous, Injected (Skin Popping), Marijuana     Comment: occasionally    Sexual activity: Never     Partners: Female   Other Topics Concern    Not on file   Social History Narrative    Not on file     Social Determinants of Health     Financial Resource Strain: Not on file   Food Insecurity: Not on file   Transportation Needs: Not on file   Physical Activity: Not on file   Stress: Not on file   Social Connections: Not on file   Intimate Partner Violence: Not on file   Housing Stability: Not on file     No Known Allergies  Outpatient Encounter Medications as of 2023   Medication Sig Dispense Refill    carvedilol (COREG) 6.25 MG Tab Take 1 Tablet by mouth 2 times a day with meals. 60 Tablet 11     aspirin 81 MG EC tablet Take 81 mg by mouth every day.      diclofenac sodium (VOLTAREN) 1 % Gel APPLY 2 GRAMS TOPICALLY 4 TIMES DAILY AS NEEDED FOR PAIN 200 g 1    DULoxetine (CYMBALTA) 30 MG Cap DR Particles 1 cap(s)      Cholecalciferol (VITAMIN D) 125 MCG (5000 UT) Cap Take 1 Capsule by mouth every day. 90 Capsule 2    rosuvastatin (CRESTOR) 10 MG Tab Take 1 Tablet by mouth every evening. 90 Tablet 3    Cholecalciferol (VITAMIN D) 125 MCG (5000 UT) Cap Take 1 Capsule by mouth every day. 90 Capsule 1    NON SPECIFIED Knee brace 1 Each 0    spironolactone (ALDACTONE) 25 MG Tab Take 1 Tablet by mouth every day. 90 Tablet 3    furosemide (LASIX) 20 MG Tab 1 TAB BY MOUTH ONCE A DAY ONLY IF NEEDED FOR LEG SWELLING. (Patient taking differently: 1 TAB BY MOUTH ONCE A DAY ONLY IF NEEDED FOR LEG SWELLING.  Pt takes everyday) 90 Tablet 2    potassium chloride SA (KDUR) 20 MEQ Tab CR Take 1 Tablet by mouth every day. 90 Tablet 2    doxycycline (VIBRAMYCIN) 100 MG Tab Take 1 Tablet by mouth 2 times a day. (Patient not taking: Reported on 11/14/2023) 20 Tablet 0    traMADol (ULTRAM) 50 MG Tab  (Patient not taking: Reported on 11/14/2023)      [DISCONTINUED] losartan (COZAAR) 50 MG Tab Take 1 Tablet by mouth every day. 90 Tablet 3    fluticasone-salmeterol (ADVAIR) 100-50 MCG/ACT AEROSOL POWDER, BREATH ACTIVATED Inhale 1 Puff every 12 hours. (Patient not taking: Reported on 11/14/2023) 180 Each 1     No facility-administered encounter medications on file as of 11/14/2023.     Review of Systems   Constitutional:  Negative for fever and malaise/fatigue.   Respiratory:  Positive for shortness of breath. Negative for cough.    Cardiovascular:  Positive for palpitations and leg swelling (Left lower extremity). Negative for chest pain, orthopnea, claudication and PND.   Gastrointestinal:  Negative for abdominal pain.   Musculoskeletal:  Negative for myalgias.   Neurological:  Negative for dizziness.   Psychiatric/Behavioral:  The  "patient is nervous/anxious.    All other systems reviewed and are negative.             Objective     /72 (BP Location: Left arm, Patient Position: Sitting, BP Cuff Size: Adult)   Pulse 75   Resp 20   Ht 1.727 m (5' 8\")   Wt (!) 168 kg (371 lb)   SpO2 90%   BMI 56.41 kg/m²     Physical Exam  Vitals reviewed.   Constitutional:       Appearance: He is well-developed. He is morbidly obese.   HENT:      Head: Normocephalic and atraumatic.   Eyes:      Pupils: Pupils are equal, round, and reactive to light.   Neck:      Vascular: No JVD.   Cardiovascular:      Rate and Rhythm: Normal rate and regular rhythm.      Heart sounds: Normal heart sounds.   Pulmonary:      Effort: Pulmonary effort is normal. No respiratory distress.      Breath sounds: Normal breath sounds. No wheezing or rales.   Abdominal:      General: Bowel sounds are normal.      Palpations: Abdomen is soft.   Musculoskeletal:         General: Normal range of motion.      Cervical back: Normal range of motion and neck supple.      Right lower leg: No edema.      Left lower le+ Pitting Edema present.   Skin:     General: Skin is warm and dry.   Neurological:      General: No focal deficit present.      Mental Status: He is alert and oriented to person, place, and time.   Psychiatric:         Behavior: Behavior normal.       Lab Results   Component Value Date/Time    CHOLSTRLTOT 134 2023 09:18 AM    LDL 72 2023 09:18 AM    HDL 34 (A) 2023 09:18 AM    TRIGLYCERIDE 142 2023 09:18 AM       Lab Results   Component Value Date/Time    SODIUM 135 2023 09:18 AM    POTASSIUM 4.3 2023 09:18 AM    CHLORIDE 102 2023 09:18 AM    CO2 23 2023 09:18 AM    GLUCOSE 98 2023 09:18 AM    BUN 21 2023 09:18 AM    CREATININE 0.69 2023 09:18 AM    GLOMRATE 113 2023 01:56 PM     Lab Results   Component Value Date/Time    ALKPHOSPHAT 75 2023 09:18 AM    ASTSGOT 24 2023 09:18 AM    " ALTSGPT 19 09/16/2023 09:18 AM    TBILIRUBIN 0.7 09/16/2023 09:18 AM      Echocardiogram 12/30/2012  Mild concentric left ventricular hypertrophy. Normal left ventricular   systolic function. Est EF 60%.   Interatrial septum bowed toward the left, consistent with elevated   right atrial pressures. Minimal appearance of bubbles. Valsalva suboptimal.   Structurally normal tricuspid valve. Mild tricuspid regurgitation.     RVSp 40-45 mmHg.      Transthoracic Echo Report 6/19/2023  Technically difficult study.  Normal left ventricular systolic function. The left ventricular   ejection fraction is visually estimated to be 65%.  The right ventricle is not well visualized.  The valves were suboptimally visualized.  No recent prior study for comparison.          Assessment & Plan     1. CHF (NYHA class II, ACC/AHA stage C) ([facility])  carvedilol (COREG) 6.25 MG Tab    Cardiac Event Monitor    EKG    HEMOGLOBIN A1C    Referral to Pharmacotherapy Service      2. Coronary artery disease due to lipid rich plaque  carvedilol (COREG) 6.25 MG Tab    Cardiac Event Monitor    EKG    HEMOGLOBIN A1C    Referral to Pharmacotherapy Service      3. Dyslipidemia  carvedilol (COREG) 6.25 MG Tab    Cardiac Event Monitor    EKG    HEMOGLOBIN A1C    Referral to Pharmacotherapy Service      4. History of diabetes mellitus  HEMOGLOBIN A1C    Referral to Pharmacotherapy Service      5. Morbid obesity with BMI of 50.0-59.9, adult ([facility])        6. Palpitations        7. Essential hypertension        8. Anxiety            Medical Decision Making: Today's Assessment/Status/Plan:        Palpitations and anxiety:  -EKG today shows sinus rhythm 74, does have slightly prolonged SD interval, will follow  -Recommend stopping losartan  -Start carvedilol 6.25 mg twice a day, patient did have PVC on EKG, he could be having more.  -Recommend getting an event monitor, patient does report completing an event monitor last year at [facility], but we have not been able to get  results of this.  -Records previously requested at last visit from Dr. Alamo's office at [facility].    HFpEF, stage C, NYHA class II, LVEF 65%  -Echo from [facility] showed an EF of 45 to 50% from 7/4/2022, recent echo 6/19/2023 showed improvement to 65%  -Consider repeating echo next year  -Patient fairly euvolemic except some trace lower extremity edema  -Continue furosemide 20 mg daily as needed, he has been taking daily   -Continue potassium 20 mEq daily with diuretic  -Continue spironolactone 25 mg daily    Hypertension  BP stable at this time  -Recommend stopping losartan due to concern of anxiety, start carvedilol 6.25 mg twice a day  -Continue furosemide, spironolactone     Dyslipidemia  -Recent LDL 72 on 9/16/2023  -Continue rosuvastatin 10 mg daily     CAD  -Diagnosed via stress test and EKG previously.  Denies any chest pain.  Patient did not have angiogram  -Statin started at this visit  -Continue aspirin 81 mg daily    Morbid obesity:  -Patient recommended to call insurance plan to see if they have suggestions for weight loss, if they do not cover referral over to bariatric surgery.  -In the meantime, I will refer him over to pharmacotherapy to help with weight loss with a GLP 1 medications and a history of diabetes.  -I will update his A1c, he will complete soon    FU in clinic in 6 weeks after event monitor. Sooner if needed.    Patient verbalizes understanding and agrees with the plan of care.     PLEASE NOTE: This Note was created using voice recognition Software. I have made every reasonable attempt to correct obvious errors, but I expect that there are errors of grammar and possibly content that I did not discover before finalizing the note

## 2023-11-21 ENCOUNTER — NON-PROVIDER VISIT (OUTPATIENT)
Dept: CARDIOLOGY | Facility: MEDICAL CENTER | Age: 58
End: 2023-11-21
Attending: NURSE PRACTITIONER
Payer: COMMERCIAL

## 2023-11-21 VITALS
SYSTOLIC BLOOD PRESSURE: 139 MMHG | HEART RATE: 67 BPM | DIASTOLIC BLOOD PRESSURE: 87 MMHG | BODY MASS INDEX: 56.41 KG/M2 | WEIGHT: 315 LBS

## 2023-11-21 DIAGNOSIS — I49.3 PVC'S (PREMATURE VENTRICULAR CONTRACTIONS): ICD-10-CM

## 2023-11-21 DIAGNOSIS — I49.1 APC (ATRIAL PREMATURE CONTRACTIONS): ICD-10-CM

## 2023-11-21 DIAGNOSIS — I50.9 CHF (NYHA CLASS II, ACC/AHA STAGE C) (HCC): ICD-10-CM

## 2023-11-21 DIAGNOSIS — E78.5 DYSLIPIDEMIA: ICD-10-CM

## 2023-11-21 DIAGNOSIS — I47.10 SVT (SUPRAVENTRICULAR TACHYCARDIA) (HCC): ICD-10-CM

## 2023-11-21 DIAGNOSIS — Z86.39 HISTORY OF DIABETES MELLITUS: ICD-10-CM

## 2023-11-21 DIAGNOSIS — I25.10 CORONARY ARTERY DISEASE DUE TO LIPID RICH PLAQUE: ICD-10-CM

## 2023-11-21 DIAGNOSIS — I25.83 CORONARY ARTERY DISEASE DUE TO LIPID RICH PLAQUE: ICD-10-CM

## 2023-11-21 PROCEDURE — 93246 EXT ECG>7D<15D RECORDING: CPT

## 2023-11-21 PROCEDURE — 99213 OFFICE O/P EST LOW 20 MIN: CPT

## 2023-11-21 RX ORDER — DAPAGLIFLOZIN 10 MG/1
10 TABLET, FILM COATED ORAL DAILY
Qty: 30 TABLET | Refills: 6 | Status: SHIPPED | OUTPATIENT
Start: 2023-11-21 | End: 2023-11-27 | Stop reason: SDUPTHER

## 2023-11-21 ASSESSMENT — FIBROSIS 4 INDEX: FIB4 SCORE: 1.743559577416269421

## 2023-11-21 NOTE — PROGRESS NOTES
Patient enrolled in the 14 day ePatch Holter monitor per YO Chandler.  In clinic hook up, monitor s/n [id number]. Pending EOS.

## 2023-11-21 NOTE — PROGRESS NOTES
"CHF Pharmacotherapy visit:    Deo Beard  1965    Informed written consent was given on: 23    HPI  Deo Beard is here for CHF and past DM   Pertinent Interval History since last visit:   New pt appt   Most recent EF:  Echo from [facility] showed an EF of 45 to 50% from 2022, recent echo 2023 showed improvement to 65%     Immunization History   Administered Date(s) Administered    Tdap Vaccine 2018       SOCIAL HISTORY  Social History     Tobacco Use   Smoking Status Former    Current packs/day: 0.00    Average packs/day: 2.5 packs/day for 20.0 years (50.0 ttl pk-yrs)    Types: Cigarettes    Start date: 1992    Quit date: 2012    Years since quittin.1   Smokeless Tobacco Never        Recent Imaging Studies:    None since last visit    DATA REVIEW  INR   Date Value Ref Range Status   2012 0.92 0.87 - 1.13 Final     Comment:     INR - Non-therapeutic Reference Range: 0.87-1.13  INR - Therapeutic Reference Range: 2.0-4.0     No results found for: \"POCINR\"   Lab Results   Component Value Date/Time    HBA1C 5.2 10/31/2017 02:56 PM          Lab Results   Component Value Date/Time    CHOLSTRLTOT 134 2023 09:18 AM    LDL 72 2023 09:18 AM    HDL 34 (A) 2023 09:18 AM    TRIGLYCERIDE 142 2023 09:18 AM       Lab Results   Component Value Date/Time    SODIUM 135 2023 09:18 AM    POTASSIUM 4.3 2023 09:18 AM    CHLORIDE 102 2023 09:18 AM    CO2 23 2023 09:18 AM    GLUCOSE 98 2023 09:18 AM    BUN 21 2023 09:18 AM    CREATININE 0.69 2023 09:18 AM    GLOMRATE 113 2023 01:56 PM     Lab Results   Component Value Date/Time    ALKPHOSPHAT 75 2023 09:18 AM    ASTSGOT 24 2023 09:18 AM    ALTSGPT 19 2023 09:18 AM    TBILIRUBIN 0.7 2023 09:18 AM    INR 0.92 2012 10:45 AM    ALBUMIN 4.0 2023 09:18 AM      Lab Results   Component Value Date/Time    RBC 4.50 2023 01:56 PM    " "RBC 5.18 06/17/2023 09:25 AM    HEMOGLOBIN 13.6 08/21/2023 01:56 PM    HEMOGLOBIN 15.6 06/17/2023 09:25 AM    HEMATOCRIT 40.1 08/21/2023 01:56 PM    HEMATOCRIT 46.7 06/17/2023 09:25 AM    MCV 89.0 08/21/2023 01:56 PM    MCV 90.2 06/17/2023 09:25 AM    MCH 30.2 08/21/2023 01:56 PM    MCH 30.1 06/17/2023 09:25 AM    MCHC 33.9 08/21/2023 01:56 PM    MCHC 33.4 06/17/2023 09:25 AM    MPV 8.8 08/21/2023 01:56 PM    MPV 11.5 06/17/2023 09:25 AM      No results found for: \"MALBCRT\", \"MICROALBUR\"    Significant changes to laboratory values since last visit that require repeat labs:  N/a  Other Pertinent Blood Work:   N/a      Current Outpatient Medications:     carvedilol, 6.25 mg, Oral, BID WITH MEALS, Taking    aspirin, 81 mg, Oral, DAILY, Taking    diclofenac sodium, APPLY 2 GRAMS TOPICALLY 4 TIMES DAILY AS NEEDED FOR PAIN, Taking    Vitamin D, 1 Capsule, Oral, DAILY, Taking    rosuvastatin, 10 mg, Oral, Q EVENING, Taking    Vitamin D, 1 Capsule, Oral, DAILY, Taking    spironolactone, 25 mg, Oral, DAILY, Taking    furosemide, 1 TAB BY MOUTH ONCE A DAY ONLY IF NEEDED FOR LEG SWELLING. (Patient taking differently: 1 TAB BY MOUTH ONCE A DAY ONLY IF NEEDED FOR LEG SWELLING. Pt takes everyday), Taking    potassium chloride SA, 20 mEq, Oral, DAILY, Taking    DULoxetine, 1 cap(s) (Patient not taking: Reported on 11/21/2023), Not Taking    NON SPECIFIED, Knee brace    Vitals:    11/21/23 1512   BP: 139/87   Pulse: 67       ASSESSMENT AND PLAN    CHF & HTN:  PUMP line number [phone] (PUMP) reviewed with patient  Is blood pressure at less <130/80 in clinic today: no  Home BP and HR:  n/a, HR n/a  Lower extremity edema present: some   Shortness of breath at rest: some   HINOJOSA, dyspnea on exertion: yes  Orthopnea: no  PND, paroxysmal nocturnal dyspnea:  no  Change in weight: increase   Exercise habits: no regular exercise program   Current Adherence to CHF and other therapies: Complete  Kidney function:   Latest Reference Range & " Units 06/17/23 09:26 08/21/23 13:56 09/16/23 09:18   Creatinine 0.50 - 1.40 mg/dL 0.66 0.72 (L) (E) 0.69       CHF medications:  Entresto or ACE/ARB: declined due to anxiety with past use of losartan   Beta blocker: continue Carvedilol 6.25 mg BID  Diuretic:continue lasix 20mg daily   Aldosterone antagonist: continue spironolactone 25mg   SGLT-2 Inhibitor:  start Farxiga 10mg daily     Weight:  Goal weight about 270lb   Declined Trulicity or Ozempic due to cost.   We will looks to see what his A1C is as he did have diabetes in the past, prior to gastric bypass surgery.    He lost approximately 100 pounds, but did regain the weight.  Reports complications with bypass surgery    Lipids:  At goal  lipid medication:   No change today.       Lifestyle   Lifestyle Recommendations From Today's Visit:   Continue to eat DASH/MED style diet.   Continue to exercise as tolerated.  Salt restriction to <2300mg daily       Blood Work Ordered At Today's visit: labs as ordered   Studies Ordered at Todays Visit: no   Follow-Up: 4 week(s)    Vlad Sher, PharmD  [facility]  Phone: [phone], Fax: [phone]    This note was created using voice recognition software (Dragon). The accuracy of the dictation is limited by the abilities of the software. I have reviewed the note prior to signing, however some errors in grammar and context are still possible. If you have any questions related to this note please do not hesitate to contact our office.     CC:  AARON Davis Ruth, A.P.R.N.

## 2023-11-24 ENCOUNTER — TELEPHONE (OUTPATIENT)
Dept: CARDIOLOGY | Facility: MEDICAL CENTER | Age: 58
End: 2023-11-24
Payer: COMMERCIAL

## 2023-11-24 ENCOUNTER — TELEPHONE (OUTPATIENT)
Dept: MEDICAL GROUP | Facility: CLINIC | Age: 58
End: 2023-11-24

## 2023-11-24 DIAGNOSIS — I25.5 ISCHEMIC CARDIOMYOPATHY: ICD-10-CM

## 2023-11-24 DIAGNOSIS — M25.552 PAIN OF LEFT HIP: ICD-10-CM

## 2023-11-24 DIAGNOSIS — I50.22 CHRONIC SYSTOLIC HEART FAILURE (HCC): ICD-10-CM

## 2023-11-24 DIAGNOSIS — E55.9 VITAMIN D DEFICIENCY: ICD-10-CM

## 2023-11-24 DIAGNOSIS — E78.5 DYSLIPIDEMIA: ICD-10-CM

## 2023-11-24 DIAGNOSIS — I50.20 ACC/AHA STAGE C SYSTOLIC HEART FAILURE (HCC): ICD-10-CM

## 2023-11-24 DIAGNOSIS — Z86.79 HISTORY OF CHF (CONGESTIVE HEART FAILURE): ICD-10-CM

## 2023-11-24 DIAGNOSIS — M79.89 LEG SWELLING: ICD-10-CM

## 2023-11-24 DIAGNOSIS — I50.9 CONGESTIVE HEART FAILURE, UNSPECIFIED HF CHRONICITY, UNSPECIFIED HEART FAILURE TYPE (HCC): ICD-10-CM

## 2023-11-24 DIAGNOSIS — M79.89 SWELLING OF LOWER LEG: ICD-10-CM

## 2023-11-24 NOTE — TELEPHONE ENCOUNTER
Greetings!!     I spoke with Deo and he'd like to switch all medications to [facility].  Please send new prescriptions for the following medications.  Unfortunately we've been unable to transfer the prescriptions from the current pharmacy.      -dapagliflozin propanediol (FARXIGA) 10 MG Tab  -carvedilol (COREG) 6.25 MG Tab  -rosuvastatin (CRESTOR) 10 MG Tab  -spironolactone (ALDACTONE) 25 MG Tab      FYI: to liaisons please send to [facility]      Nery Germain  Rx Coordinator   [phone]

## 2023-11-24 NOTE — TELEPHONE ENCOUNTER
Greetings!!     I spoke with Deo and he'd like to switch all medications to [facility].  Please send new prescriptions for the following medications.  Unfortunately we've been unable to transfer the prescriptions from the current pharmacy.        -diclofenac sodium (VOLTAREN) 1 % Gel   -Cholecalciferol (VITAMIN D) 125 MCG (5000 UT) Cap   -potassium chloride SA (KDUR) 20 MEQ Tab CR   -furosemide (LASIX) 20 MG Tab   -DULoxetine (CYMBALTA) 30 MG Cap DR Lady Germain  Rx Coordinator   [phone]

## 2023-11-25 ENCOUNTER — HOSPITAL ENCOUNTER (OUTPATIENT)
Dept: LAB | Facility: MEDICAL CENTER | Age: 58
End: 2023-11-25
Attending: PHYSICIAN ASSISTANT
Payer: COMMERCIAL

## 2023-11-25 ENCOUNTER — HOSPITAL ENCOUNTER (OUTPATIENT)
Dept: LAB | Facility: MEDICAL CENTER | Age: 58
End: 2023-11-25
Attending: NURSE PRACTITIONER
Payer: COMMERCIAL

## 2023-11-25 DIAGNOSIS — I25.10 CORONARY ARTERY DISEASE DUE TO LIPID RICH PLAQUE: ICD-10-CM

## 2023-11-25 DIAGNOSIS — I10 ESSENTIAL HYPERTENSION: ICD-10-CM

## 2023-11-25 DIAGNOSIS — I50.9 CHF (NYHA CLASS II, ACC/AHA STAGE C) (HCC): ICD-10-CM

## 2023-11-25 DIAGNOSIS — I25.83 CORONARY ARTERY DISEASE DUE TO LIPID RICH PLAQUE: ICD-10-CM

## 2023-11-25 DIAGNOSIS — Z86.39 HISTORY OF DIABETES MELLITUS: ICD-10-CM

## 2023-11-25 DIAGNOSIS — E78.5 DYSLIPIDEMIA: ICD-10-CM

## 2023-11-25 LAB
ANION GAP SERPL CALC-SCNC: 5 MMOL/L (ref 7–16)
BUN SERPL-MCNC: 18 MG/DL (ref 8–22)
CALCIUM SERPL-MCNC: 8.7 MG/DL (ref 8.5–10.5)
CHLORIDE SERPL-SCNC: 100 MMOL/L (ref 96–112)
CO2 SERPL-SCNC: 30 MMOL/L (ref 20–33)
CREAT SERPL-MCNC: 0.72 MG/DL (ref 0.5–1.4)
EST. AVERAGE GLUCOSE BLD GHB EST-MCNC: 126 MG/DL
GFR SERPLBLD CREATININE-BSD FMLA CKD-EPI: 106 ML/MIN/1.73 M 2
GLUCOSE SERPL-MCNC: 90 MG/DL (ref 65–99)
HBA1C MFR BLD: 6 % (ref 4–5.6)
POTASSIUM SERPL-SCNC: 4.8 MMOL/L (ref 3.6–5.5)
SODIUM SERPL-SCNC: 135 MMOL/L (ref 135–145)

## 2023-11-25 PROCEDURE — 36415 COLL VENOUS BLD VENIPUNCTURE: CPT

## 2023-11-25 PROCEDURE — 83036 HEMOGLOBIN GLYCOSYLATED A1C: CPT

## 2023-11-25 PROCEDURE — 80048 BASIC METABOLIC PNL TOTAL CA: CPT

## 2023-11-27 DIAGNOSIS — I50.9 CHF (NYHA CLASS II, ACC/AHA STAGE C) (HCC): ICD-10-CM

## 2023-11-27 DIAGNOSIS — E78.5 DYSLIPIDEMIA: ICD-10-CM

## 2023-11-27 DIAGNOSIS — Z86.39 HISTORY OF DIABETES MELLITUS: ICD-10-CM

## 2023-11-27 DIAGNOSIS — I50.32 CHRONIC HEART FAILURE WITH PRESERVED EJECTION FRACTION (HCC): ICD-10-CM

## 2023-11-27 DIAGNOSIS — I25.83 CORONARY ARTERY DISEASE DUE TO LIPID RICH PLAQUE: ICD-10-CM

## 2023-11-27 DIAGNOSIS — I10 ESSENTIAL HYPERTENSION: ICD-10-CM

## 2023-11-27 DIAGNOSIS — I25.10 CORONARY ARTERY DISEASE DUE TO LIPID RICH PLAQUE: ICD-10-CM

## 2023-11-27 RX ORDER — DAPAGLIFLOZIN 10 MG/1
10 TABLET, FILM COATED ORAL DAILY
Qty: 30 TABLET | Refills: 6 | Status: SHIPPED | OUTPATIENT
Start: 2023-11-27 | End: 2023-11-27 | Stop reason: SDUPTHER

## 2023-11-27 RX ORDER — SPIRONOLACTONE 25 MG/1
25 TABLET ORAL DAILY
Qty: 90 TABLET | Refills: 3 | Status: SHIPPED | OUTPATIENT
Start: 2023-11-27

## 2023-11-27 RX ORDER — FUROSEMIDE 20 MG/1
TABLET ORAL
Qty: 90 TABLET | Refills: 2 | Status: SHIPPED | OUTPATIENT
Start: 2023-11-27

## 2023-11-27 RX ORDER — DULOXETIN HYDROCHLORIDE 30 MG/1
CAPSULE, DELAYED RELEASE ORAL
Qty: 30 CAPSULE | Refills: 0 | Status: SHIPPED | OUTPATIENT
Start: 2023-11-27

## 2023-11-27 RX ORDER — CHOLECALCIFEROL (VITAMIN D3) 125 MCG
1 CAPSULE ORAL DAILY
Qty: 90 CAPSULE | Refills: 2 | Status: SHIPPED | OUTPATIENT
Start: 2023-11-27

## 2023-11-27 RX ORDER — CARVEDILOL 6.25 MG/1
6.25 TABLET ORAL 2 TIMES DAILY WITH MEALS
Qty: 60 TABLET | Refills: 11 | Status: SHIPPED | OUTPATIENT
Start: 2023-11-27 | End: 2024-01-08 | Stop reason: SDUPTHER

## 2023-11-27 RX ORDER — DAPAGLIFLOZIN 10 MG/1
10 TABLET, FILM COATED ORAL DAILY
Qty: 90 TABLET | Refills: 2 | Status: SHIPPED | OUTPATIENT
Start: 2023-11-27

## 2023-11-27 RX ORDER — POTASSIUM CHLORIDE 20 MEQ/1
20 TABLET, EXTENDED RELEASE ORAL DAILY
Qty: 90 TABLET | Refills: 2 | Status: SHIPPED | OUTPATIENT
Start: 2023-11-27

## 2023-11-27 RX ORDER — DAPAGLIFLOZIN 10 MG/1
10 TABLET, FILM COATED ORAL DAILY
Qty: 30 TABLET | Refills: 6 | Status: SHIPPED | OUTPATIENT
Start: 2023-11-27 | End: 2023-11-27

## 2023-11-27 RX ORDER — ROSUVASTATIN CALCIUM 10 MG/1
10 TABLET, COATED ORAL EVERY EVENING
Qty: 90 TABLET | Refills: 3 | Status: SHIPPED | OUTPATIENT
Start: 2023-11-27

## 2023-11-30 PROCEDURE — RXMED WILLOW AMBULATORY MEDICATION CHARGE: Performed by: NURSE PRACTITIONER

## 2023-11-30 PROCEDURE — RXMED WILLOW AMBULATORY MEDICATION CHARGE: Performed by: PHYSICIAN ASSISTANT

## 2023-12-01 ENCOUNTER — PHARMACY VISIT (OUTPATIENT)
Dept: PHARMACY | Facility: MEDICAL CENTER | Age: 58
End: 2023-12-01
Payer: COMMERCIAL

## 2023-12-01 ENCOUNTER — APPOINTMENT (OUTPATIENT)
Dept: SLEEP MEDICINE | Facility: MEDICAL CENTER | Age: 58
End: 2023-12-01
Attending: PHYSICIAN ASSISTANT
Payer: COMMERCIAL

## 2023-12-18 ENCOUNTER — TELEPHONE (OUTPATIENT)
Dept: CARDIOLOGY | Facility: MEDICAL CENTER | Age: 58
End: 2023-12-18
Payer: COMMERCIAL

## 2023-12-18 NOTE — TELEPHONE ENCOUNTER
Called and spoke with patient.    Patient denies any symptoms at this time.  Patient does state he gets SOB often.    Discussed ER precautions.    Advised patient I will call him back after I get ADE[facility] recommendations

## 2023-12-19 PROCEDURE — 93248 EXT ECG>7D<15D REV&INTERPJ: CPT | Performed by: INTERNAL MEDICINE

## 2024-01-05 NOTE — TELEPHONE ENCOUNTER
DUSTY Chandler  You3 minutes ago (4:07 PM)       Event monitor results indicate patient does not have A-fib per diagnostic qualifications, will discuss with patient at his visit next week

## 2024-01-05 NOTE — TELEPHONE ENCOUNTER
Event monitor results indicate patient does not have A-fib per diagnostic qualifications, will discuss with patient at his visit next week

## 2024-01-08 ENCOUNTER — NON-PROVIDER VISIT (OUTPATIENT)
Dept: CARDIOLOGY | Facility: MEDICAL CENTER | Age: 59
End: 2024-01-08
Attending: NURSE PRACTITIONER
Payer: COMMERCIAL

## 2024-01-08 VITALS
OXYGEN SATURATION: 90 % | BODY MASS INDEX: 47.74 KG/M2 | DIASTOLIC BLOOD PRESSURE: 74 MMHG | WEIGHT: 315 LBS | HEART RATE: 70 BPM | RESPIRATION RATE: 16 BRPM | HEIGHT: 68 IN | SYSTOLIC BLOOD PRESSURE: 140 MMHG

## 2024-01-08 VITALS — BODY MASS INDEX: 55.65 KG/M2 | WEIGHT: 315 LBS

## 2024-01-08 DIAGNOSIS — R00.2 PALPITATIONS: ICD-10-CM

## 2024-01-08 DIAGNOSIS — I10 PRIMARY HYPERTENSION: ICD-10-CM

## 2024-01-08 DIAGNOSIS — I10 HTN (HYPERTENSION), MALIGNANT: ICD-10-CM

## 2024-01-08 DIAGNOSIS — E11.9 TYPE 2 DIABETES MELLITUS WITHOUT COMPLICATION, WITHOUT LONG-TERM CURRENT USE OF INSULIN (HCC): ICD-10-CM

## 2024-01-08 DIAGNOSIS — E78.5 DYSLIPIDEMIA: ICD-10-CM

## 2024-01-08 DIAGNOSIS — I50.9 CHF (NYHA CLASS II, ACC/AHA STAGE C) (HCC): ICD-10-CM

## 2024-01-08 DIAGNOSIS — I25.10 CORONARY ARTERY DISEASE DUE TO LIPID RICH PLAQUE: ICD-10-CM

## 2024-01-08 DIAGNOSIS — E66.9 OBESITY (BMI 35.0-39.9 WITHOUT COMORBIDITY): ICD-10-CM

## 2024-01-08 DIAGNOSIS — E66.01 MORBID OBESITY WITH BMI OF 50.0-59.9, ADULT (HCC): ICD-10-CM

## 2024-01-08 DIAGNOSIS — I25.83 CORONARY ARTERY DISEASE DUE TO LIPID RICH PLAQUE: ICD-10-CM

## 2024-01-08 PROCEDURE — 3078F DIAST BP <80 MM HG: CPT | Performed by: NURSE PRACTITIONER

## 2024-01-08 PROCEDURE — 99213 OFFICE O/P EST LOW 20 MIN: CPT | Performed by: NURSE PRACTITIONER

## 2024-01-08 PROCEDURE — 3077F SYST BP >= 140 MM HG: CPT | Performed by: NURSE PRACTITIONER

## 2024-01-08 PROCEDURE — 99214 OFFICE O/P EST MOD 30 MIN: CPT | Performed by: NURSE PRACTITIONER

## 2024-01-08 PROCEDURE — RXMED WILLOW AMBULATORY MEDICATION CHARGE: Performed by: NURSE PRACTITIONER

## 2024-01-08 RX ORDER — SEMAGLUTIDE 0.68 MG/ML
0.25 INJECTION, SOLUTION SUBCUTANEOUS
Qty: 3 ML | Refills: 1 | Status: SHIPPED | OUTPATIENT
Start: 2024-01-08 | End: 2024-01-29

## 2024-01-08 RX ORDER — CARVEDILOL 12.5 MG/1
12.5 TABLET ORAL 2 TIMES DAILY WITH MEALS
Qty: 180 TABLET | Refills: 3 | Status: SHIPPED | OUTPATIENT
Start: 2024-01-08

## 2024-01-08 RX ORDER — GLUCOSAMINE HCL/CHONDROITIN SU 500-400 MG
CAPSULE ORAL
Qty: 100 EACH | Refills: 3 | Status: SHIPPED | OUTPATIENT
Start: 2024-01-08

## 2024-01-08 RX ORDER — DIPHENHYDRAMINE HYDROCHLORIDE 25 MG/1
CAPSULE, LIQUID FILLED ORAL
Qty: 1 KIT | Refills: 0 | Status: SHIPPED | OUTPATIENT
Start: 2024-01-08

## 2024-01-08 RX ORDER — LANCETS 30 GAUGE
EACH MISCELLANEOUS
Qty: 100 EACH | Refills: 3 | Status: SHIPPED | OUTPATIENT
Start: 2024-01-08

## 2024-01-08 ASSESSMENT — FIBROSIS 4 INDEX
FIB4 SCORE: 1.743559577416269421
FIB4 SCORE: 1.743559577416269421

## 2024-01-08 ASSESSMENT — ENCOUNTER SYMPTOMS
PND: 0
FEVER: 0
MYALGIAS: 0
CLAUDICATION: 0
SHORTNESS OF BREATH: 0
PALPITATIONS: 1
DIZZINESS: 0
COUGH: 0
ORTHOPNEA: 0
NERVOUS/ANXIOUS: 1
ABDOMINAL PAIN: 0

## 2024-01-08 NOTE — PROGRESS NOTES
"Patient Consult Note    Primary care physician: Marian Chang P.A.-C.    Reason for consult: Management of Controlled Type 2 Diabetes    HPI:  Deo Beard is a 58 y.o. old patient who comes in today for evaluation of above stated problem.    Allergies  Patient has no known allergies.    Current Diabetes Medication Regimen  SGLT-2 Inhibitor: dapagliflozin (Farxiga) 10 mg daily    Previous Diabetes Medications and Reason for Discontinuation  Metformin - Dc'd d/t controlled disease    Potential Barriers to Care:  Adherence: N/a  Side effects: N/a  Affordability: None at this time    SMBG  Pt has home glucometer and proper testing technique - Does not monitor d/t controlled disease  Discussed BG Goals: FBG 80 - 130, 2hPP < 180, A1c < 7%    Hypoglycemia  Hypoglycemia awareness: Yes  Nocturnal hypoglycemia: None  Hypoglycemia:  None  Pt's treatment of Hypoglycemia  Discussed 15:15 Rule    Lifestyle  Current Exercise - None  Exercise Goal - Discussed goal of 150 min/week. Encouraged more walking.    Dietary:  Breakfast - Sausage and egg croissant  Lunch - Bologna sandwich w/ chips  Dinner - Varies. Typically meat and vegetable  Snacks - None typically  Drinks - Pepsi x 3 per day (regular - he's unwilling to DC), water    Preventative Management  BP regimen (ACEi/ARB): None  Statin: rosuvastatin (Crestor) 10 mg daily  Last Microalbumin/Cr:  No results found for: \"MALBCRT\", \"MICROALBUR\"  Last A1c:  Lab Results   Component Value Date/Time    HBA1C 6.0 (H) 11/25/2023 08:26 AM      Last Retinal Scan: Pt to schedule prior to f/u    Monofilament exam: Due - will complete at f/u visits    Labs  Lab Results   Component Value Date/Time    SODIUM 135 11/25/2023 08:26 AM    POTASSIUM 4.8 11/25/2023 08:26 AM    CHLORIDE 100 11/25/2023 08:26 AM    CO2 30 11/25/2023 08:26 AM    GLUCOSE 90 11/25/2023 08:26 AM    BUN 18 11/25/2023 08:26 AM    CREATININE 0.72 11/25/2023 08:26 AM    GLOMRATE 113 08/21/2023 01:56 PM     Lab Results "   Component Value Date/Time    ALKPHOSPHAT 75 09/16/2023 09:18 AM    ASTSGOT 24 09/16/2023 09:18 AM    ALTSGPT 19 09/16/2023 09:18 AM    TBILIRUBIN 0.7 09/16/2023 09:18 AM    INR 0.92 12/26/2012 10:45 AM    ALBUMIN 4.0 09/16/2023 09:18 AM        Physical Examination:  Vital signs: Wt (!) 166 kg (366 lb)   BMI 55.65 kg/m²  Body mass index is 55.65 kg/m².    Assessment and Plan:    1. DM2  Basic physiology of DMII was explained to patient as well as microvascular/macrovascular complications. The importance of increasing physical activity to improve diabetes control was discussed with the patient. Patient was also educated on changing diet and making better choices to help control blood sugar.   Discussed Goals: FBG 80 - 130, 2hPP < 180, a1c < 7.0%   Last a1c drawn on 11/25/23 was 6%, which is at goal  Sent in glucometer Rx as pt does not currently have one - instructed to test PRN given controlled DM.  Pt referred to pharmacotherapy clinic to assist pt w/ continued DM control as well as obesity management.  Pt w/ hx of gastric sleeve. He did lose ~ 100 lbs, but unfortunately did regain the weight back.  Pt previously declined GLP1 therapy d/t concern for cost - pt did transition his ins and believes he would now have better coverage. He's now open to trying a GLP1 agent.    - Medication changes:  START Ozempic 0.25 mg subQ once weekly   Counseled pt regarding MOA, SE, and administration  He will obtain  copay card if unaffordable  - Continue:  Farxiga 10 mg once daily     - Lifestyle changes:  Diet: Maximize lean proteins and veggies. Cut out/down on carbs. Avoid simple sugars.   Exercise: Increase as tolerated    Follow Up:  3 weeks    Cassius Ventura, PharmD, BCACP    CC:   Marian Chang P.A.-C.

## 2024-01-09 ENCOUNTER — PHARMACY VISIT (OUTPATIENT)
Dept: PHARMACY | Facility: MEDICAL CENTER | Age: 59
End: 2024-01-09
Payer: COMMERCIAL

## 2024-01-09 NOTE — PROGRESS NOTES
"Chief Complaint   Patient presents with    Hypertension     Follow up       Subjective     Deo Beard is a 58 y.o. male who presents today for follow-up on his heart failure, hypertension, possible history of CAD with his mother, Gerhard.    Patient of Dr. Garcia.  Patient was last seen in clinic on 11/14/2023.  During that visit, losartan was discontinued and patient was started on carvedilol 6.25 mg twice a day for his palpitations.  He reports no problems with the medications.  Patient was also sent for an event monitor to evaluate his palpitations.    Since starting the medications, patient reports that his palpitations have improved.  He also mentions his left lower extremity edema has resolved.  He denies chest pain, orthopnea, PND, edema or dizziness/lightheadedness.  He does have some shortness of breath    He has not been checking his weight.    Patient did get new insurance this year and will be checking in to see if he can follow-up with the bariatric surgeon.    Additionally, patient has the following medical problems:     -Previous heart failure    -Presumed CAD on prior stress test    -Diabetes    -Dyslipidemia    -MARY, not on BiPAP    -Prior polysubstance abuse    Past Medical History:   Diagnosis Date    Abnormal EKG 12/27/2012    Abnormal EKG 12/27/2012    Bronchitis     CAD (coronary artery disease) 2008    Depression     Diabetes ([facility])     HTN (hypertension)     Hyperlipidemia     Hypoxia 12/27/2012    Ischemic cardiomyopathy     Leukocytosis (leucocytosis) 12/27/2012    Medically noncompliant 7/18/2013    MI (myocardial infarction) ([facility])     Silent MI , ( old), noted on EKG in 2008    Migraine     Personal history of venous thrombosis and embolism     leg \"years ago\"    Pneumonia     5 months ago    Polysubstance abuse ([facility]) 7/18/2013    [place] spotted fever     June 2017    Sleep apnea, obstructive     CPAP 2010    Substance abuse ([facility])     2007; Meth    Tobacco abuse 7/18/2013 "     Past Surgical History:   Procedure Laterality Date    GASTRIC BANDING LAPAROSCOPIC      gastric sleeve and switch    PB INCIS/DRAIN FOREARM DEEP ABSCESS      left AC due to IV drug abuse    HYDROCELECTOMY ADULT      ORCHIECTOMY Right     ORIF, WRIST      left wrist     Family History   Problem Relation Age of Onset    Cancer Mother         intestine    Heart Disease Father         CHF    Stroke Father     Heart Attack Father     Cancer Brother         Colon Cancer    Arthritis Maternal Grandmother     Arthritis Maternal Grandfather     Arthritis Paternal Grandmother     Cancer Paternal Grandfather     Heart Disease Paternal Grandfather      Social History     Socioeconomic History    Marital status: Single     Spouse name: Not on file    Number of children: Not on file    Years of education: Not on file    Highest education level: Not on file   Occupational History    Not on file   Tobacco Use    Smoking status: Former     Current packs/day: 0.00     Average packs/day: 2.5 packs/day for 20.0 years (50.0 ttl pk-yrs)     Types: Cigarettes     Start date: 1992     Quit date: 2012     Years since quittin.2    Smokeless tobacco: Never   Vaping Use    Vaping Use: Never used   Substance and Sexual Activity    Alcohol use: No    Drug use: Not Currently     Types: Intravenous, Injected (Skin Popping), Marijuana     Comment: occasionally    Sexual activity: Never     Partners: Female   Other Topics Concern    Not on file   Social History Narrative    Not on file     Social Determinants of Health     Financial Resource Strain: Not on file   Food Insecurity: Not on file   Transportation Needs: Not on file   Physical Activity: Not on file   Stress: Not on file   Social Connections: Not on file   Intimate Partner Violence: Not on file   Housing Stability: Not on file     No Known Allergies  Outpatient Encounter Medications as of 2024   Medication Sig Dispense Refill    Semaglutide,0.25 or 0.5MG/DOS,  (OZEMPIC, 0.25 OR 0.5 MG/DOSE,) 2 MG/3ML Solution Pen-injector Inject 0.25 mg under the skin every 7 days. 3 mL 1    carvedilol (COREG) 12.5 MG Tab Take 1 Tablet by mouth 2 times a day with meals. 180 Tablet 3    diclofenac sodium (VOLTAREN) 1 % Gel APPLY 2 GRAMS TOPICALLY 4 TIMES DAILY AS NEEDED FOR PAIN 200 g 0    Cholecalciferol (VITAMIN D) 125 MCG (5000 UT) Cap Take 1 Capsule by mouth every day. 90 Capsule 2    potassium chloride SA (KDUR) 20 MEQ Tab CR Take 1 Tablet by mouth every day. 90 Tablet 2    furosemide (LASIX) 20 MG Tab TAKE 1 TAB BY MOUTH ONCE A DAY ONLY IF NEEDED FOR LEG SWELLING. 90 Tablet 2    DULoxetine (CYMBALTA) 30 MG Cap DR Particles Take 1 cap(s) orally every day. 30 Capsule 0    rosuvastatin (CRESTOR) 10 MG Tab Take 1 Tablet by mouth every evening. 90 Tablet 3    spironolactone (ALDACTONE) 25 MG Tab Take 1 Tablet by mouth every day. 90 Tablet 3    dapagliflozin propanediol (FARXIGA) 10 MG Tab Take 1 Tablet by mouth every day. 90 Tablet 2    aspirin 81 MG EC tablet Take 81 mg by mouth every day.      Blood Glucose Monitoring Suppl (BLOOD GLUCOSE MONITOR SYSTEM) w/Device Kit Test blood sugar as recommended by provider. 1 Kit 0    Blood Glucose Test Strips Use one strip to test blood sugar once daily early morning before first meal. 100 Strip 3    Lancets Use one lancet to test blood sugar once daily early morning before first meal. 100 Each 3    Alcohol Swabs Wipe site with prep pad prior to injection. 100 Each 3    [DISCONTINUED] carvedilol (COREG) 6.25 MG Tab Take 1 Tablet by mouth 2 times a day with meals. 60 Tablet 11    NON SPECIFIED Knee brace 1 Each 0    [DISCONTINUED] Cholecalciferol (VITAMIN D) 125 MCG (5000 UT) Cap Take 1 Capsule by mouth every day. 90 Capsule 1     No facility-administered encounter medications on file as of 1/8/2024.     Review of Systems   Constitutional:  Negative for fever and malaise/fatigue.   Respiratory:  Negative for cough and shortness of breath.   "  Cardiovascular:  Positive for palpitations (improved). Negative for chest pain, orthopnea, claudication, leg swelling (Left lower extremity-improved) and PND.   Gastrointestinal:  Negative for abdominal pain.   Musculoskeletal:  Negative for myalgias.   Neurological:  Negative for dizziness.   Psychiatric/Behavioral:  The patient is nervous/anxious.    All other systems reviewed and are negative.             Objective     BP (!) 140/74 (BP Location: Left arm, Patient Position: Sitting)   Pulse 70   Resp 16   Ht 1.727 m (5' 8\")   Wt (!) 165 kg (364 lb)   SpO2 90%   BMI 55.35 kg/m²     Physical Exam  Vitals reviewed.   Constitutional:       Appearance: He is well-developed. He is morbidly obese.   HENT:      Head: Normocephalic and atraumatic.   Eyes:      Pupils: Pupils are equal, round, and reactive to light.   Neck:      Vascular: No JVD.   Cardiovascular:      Rate and Rhythm: Normal rate and regular rhythm.      Heart sounds: Normal heart sounds.   Pulmonary:      Effort: Pulmonary effort is normal. No respiratory distress.      Breath sounds: Normal breath sounds. No wheezing or rales.   Abdominal:      General: Bowel sounds are normal.      Palpations: Abdomen is soft.   Musculoskeletal:         General: Normal range of motion.      Cervical back: Normal range of motion and neck supple.      Right lower leg: No edema.      Left lower leg: No edema.   Skin:     General: Skin is warm and dry.   Neurological:      General: No focal deficit present.      Mental Status: He is alert and oriented to person, place, and time.   Psychiatric:         Behavior: Behavior normal.       Lab Results   Component Value Date/Time    CHOLSTRLTOT 134 09/16/2023 09:18 AM    LDL 72 09/16/2023 09:18 AM    HDL 34 (A) 09/16/2023 09:18 AM    TRIGLYCERIDE 142 09/16/2023 09:18 AM       Lab Results   Component Value Date/Time    SODIUM 135 11/25/2023 08:26 AM    POTASSIUM 4.8 11/25/2023 08:26 AM    CHLORIDE 100 11/25/2023 08:26 AM "    CO2 30 11/25/2023 08:26 AM    GLUCOSE 90 11/25/2023 08:26 AM    BUN 18 11/25/2023 08:26 AM    CREATININE 0.72 11/25/2023 08:26 AM    GLOMRATE 113 08/21/2023 01:56 PM     Lab Results   Component Value Date/Time    ALKPHOSPHAT 75 09/16/2023 09:18 AM    ASTSGOT 24 09/16/2023 09:18 AM    ALTSGPT 19 09/16/2023 09:18 AM    TBILIRUBIN 0.7 09/16/2023 09:18 AM      Echocardiogram 12/30/2012  Mild concentric left ventricular hypertrophy. Normal left ventricular   systolic function. Est EF 60%.   Interatrial septum bowed toward the left, consistent with elevated   right atrial pressures. Minimal appearance of bubbles. Valsalva suboptimal.   Structurally normal tricuspid valve. Mild tricuspid regurgitation.     RVSp 40-45 mmHg.      Transthoracic Echo Report 6/19/2023  Technically difficult study.  Normal left ventricular systolic function. The left ventricular   ejection fraction is visually estimated to be 65%.  The right ventricle is not well visualized.  The valves were suboptimally visualized.  No recent prior study for comparison.        E patch event monitor 11/21/2023-12/5/2023  Summary:     Short runs of supraventricular tachycardia that do not meet criteria for diagnosis of atrial flutter or atrial fibrillation.   Rare premature atrial complexes (<1%) and premature ventricular complexes (<1%).   No malignant arrhythmias identified.   No sinus pause.   No significant AV block.     Assessment & Plan     1. CHF (NYHA class II, ACC/AHA stage C) ([facility])  carvedilol (COREG) 12.5 MG Tab    Comp Metabolic Panel    Lipid Profile      2. Coronary artery disease due to lipid rich plaque  carvedilol (COREG) 12.5 MG Tab    Comp Metabolic Panel    Lipid Profile      3. Dyslipidemia  carvedilol (COREG) 12.5 MG Tab    Comp Metabolic Panel    Lipid Profile      4. Morbid obesity with BMI of 50.0-59.9, adult ([facility])        5. Palpitations        6. HTN (hypertension), malignant            Medical Decision Making: Today's  Assessment/Status/Plan:        Palpitations and anxiety:  -Improved  -Reviewed results of event monitor  -Patient did have a short run of SVT that did not meet criteria for A-fib/flutter  -Increase carvedilol to 12.5 mg twice a day  -Records previously requested at last visit from Dr. Alamo's office at [facility].    HFpEF, stage C, NYHA class II, LVEF 65%  -Echo from [facility] showed an EF of 45 to 50% from 7/4/2022, recent echo 6/19/2023 showed improvement to 65%  -Consider repeating echo next year  -Patient fairly euvolemic except some trace lower extremity edema  -Continue furosemide 20 mg daily as needed, he has been taking daily   -Continue potassium 20 mEq daily with diuretic  -Continue spironolactone 25 mg daily    Hypertension  BP borderline elevated today after changing meds last visit   -Increase carvedilol to 12.5 mg twice a day  -Continue furosemide, spironolactone     Dyslipidemia  -Recent LDL 72 on 9/16/2023  -Continue rosuvastatin 10 mg daily     CAD  -Diagnosed via stress test and EKG previously.  Denies any chest pain.  Patient did not have angiogram  -Continue rosuvastatin 10 mg daily  -Continue aspirin 81 mg daily    Morbid obesity:  -Patient will reach out to new insurance to see if his new plan covers bariatric surgery.  - he is currently followed by pharmacotherapy and was started on Ozempic today    FU in clinic in 6 months with Dr. Garcia. Sooner if needed.    Patient verbalizes understanding and agrees with the plan of care.     PLEASE NOTE: This Note was created using voice recognition Software. I have made every reasonable attempt to correct obvious errors, but I expect that there are errors of grammar and possibly content that I did not discover before finalizing the note

## 2024-01-25 NOTE — PROGRESS NOTES
"Patient Consult Note    Primary care physician: Marian Chang P.A.-C.    Reason for consult: Management of Controlled Type 2 Diabetes    HPI:  Deo Beard is a 58 y.o. old patient who comes in today for evaluation of above stated problem.    Allergies  Patient has no known allergies.    Current Diabetes Medication Regimen  SGLT-2 Inhibitor: dapagliflozin (Farxiga) 10 mg daily  GLP1RA: Ozempic 0.25mg q 7 days    Previous Diabetes Medications and Reason for Discontinuation  Metformin - Dc'd d/t controlled disease    Potential Barriers to Care:  Adherence: N/a  Side effects: N/a  Affordability: None at this time    SMBG  Pt has home glucometer and proper testing technique - Does not monitor d/t controlled disease  Discussed BG Goals: FBG 80 - 130, 2hPP < 180, A1c < 7%    Hypoglycemia  Hypoglycemia awareness: Yes  Nocturnal hypoglycemia: None  Hypoglycemia:  None  Pt's treatment of Hypoglycemia  Discussed 15:15 Rule    Lifestyle  Current Exercise - None  Exercise Goal - Discussed goal of 150 min/week. Encouraged more walking.    Dietary:  Breakfast - Sausage and egg croissant  Lunch - Bologna sandwich w/ chips  Dinner - Varies. Typically meat and vegetable  Snacks - None typically  Drinks - Pepsi x 3 per day (regular - he's unwilling to DC), water    Preventative Management  BP regimen (ACEi/ARB): None  Statin: rosuvastatin (Crestor) 10 mg daily  Last Microalbumin/Cr:  No results found for: \"MALBCRT\", \"MICROALBUR\"  Last A1c:  Lab Results   Component Value Date/Time    HBA1C 6.0 (H) 11/25/2023 08:26 AM      Last Retinal Scan: Pt to schedule prior to f/u    Monofilament exam: Due - will complete at f/u visits    Labs  Lab Results   Component Value Date/Time    SODIUM 135 11/25/2023 08:26 AM    POTASSIUM 4.8 11/25/2023 08:26 AM    CHLORIDE 100 11/25/2023 08:26 AM    CO2 30 11/25/2023 08:26 AM    GLUCOSE 90 11/25/2023 08:26 AM    BUN 18 11/25/2023 08:26 AM    CREATININE 0.72 11/25/2023 08:26 AM    GLOMRATE 113 " 08/21/2023 01:56 PM     Lab Results   Component Value Date/Time    ALKPHOSPHAT 75 09/16/2023 09:18 AM    ASTSGOT 24 09/16/2023 09:18 AM    ALTSGPT 19 09/16/2023 09:18 AM    TBILIRUBIN 0.7 09/16/2023 09:18 AM    INR 0.92 12/26/2012 10:45 AM    ALBUMIN 4.0 09/16/2023 09:18 AM        Physical Examination:  Vital signs: There were no vitals taken for this visit. There is no height or weight on file to calculate BMI.    Assessment and Plan:    1. DM2  Discussed Goals: FBG 80 - 130, 2hPP < 180, a1c < 7.0%   Last a1c drawn on 11/25/23 was 6%, which is at goal  Sent in glucometer Rx as pt does not currently have one - instructed to test PRN given controlled DM.  Pt referred to pharmacotherapy clinic to assist pt w/ continued DM control as well as obesity management.  Pt w/ hx of gastric sleeve. He did lose ~ 100 lbs, but unfortunately did regain the weight back.  Pt denies any side effects associated with the Ozempic. He is willing to increase the dose at this time. Discussed the time frame for increasing the dose as being about every 4 weeks.   Educated patient on ways to avoid some of the common side effects associated with Ozempic. Pt verbalized understanding.    - Medication changes:  Increase Ozempic to 0.5 mg every 7 days  - Continue:  Farxiga 10 mg once daily     - Lifestyle changes:  Diet: Maximize lean proteins and veggies. Cut out/down on carbs. Avoid simple sugars.   Exercise: Start simple by walking daily and Increase as tolerated    Follow Up:  5 weeks    Carly Delarosa, PharmD, BCACP    CC:   Marian Chang P.A.-C.

## 2024-01-27 ENCOUNTER — HOSPITAL ENCOUNTER (OUTPATIENT)
Dept: LAB | Facility: MEDICAL CENTER | Age: 59
End: 2024-01-27
Attending: NURSE PRACTITIONER
Payer: COMMERCIAL

## 2024-01-27 ENCOUNTER — HOSPITAL ENCOUNTER (OUTPATIENT)
Dept: LAB | Facility: MEDICAL CENTER | Age: 59
End: 2024-01-27
Attending: PHYSICIAN ASSISTANT
Payer: COMMERCIAL

## 2024-01-27 DIAGNOSIS — I50.9 CHF (NYHA CLASS II, ACC/AHA STAGE C) (HCC): ICD-10-CM

## 2024-01-27 DIAGNOSIS — I10 PRIMARY HYPERTENSION: ICD-10-CM

## 2024-01-27 DIAGNOSIS — E78.5 DYSLIPIDEMIA: ICD-10-CM

## 2024-01-27 DIAGNOSIS — E11.9 TYPE 2 DIABETES MELLITUS WITHOUT COMPLICATION, WITHOUT LONG-TERM CURRENT USE OF INSULIN (HCC): ICD-10-CM

## 2024-01-27 DIAGNOSIS — E78.2 MIXED HYPERLIPIDEMIA: ICD-10-CM

## 2024-01-27 DIAGNOSIS — E66.9 OBESITY (BMI 35.0-39.9 WITHOUT COMORBIDITY): ICD-10-CM

## 2024-01-27 DIAGNOSIS — I25.83 CORONARY ARTERY DISEASE DUE TO LIPID RICH PLAQUE: ICD-10-CM

## 2024-01-27 DIAGNOSIS — I25.10 CORONARY ARTERY DISEASE DUE TO LIPID RICH PLAQUE: ICD-10-CM

## 2024-01-27 LAB
ALBUMIN SERPL BCP-MCNC: 3.6 G/DL (ref 3.2–4.9)
ALBUMIN SERPL BCP-MCNC: 4 G/DL (ref 3.2–4.9)
ALBUMIN/GLOB SERPL: 0.8 G/DL
ALBUMIN/GLOB SERPL: 1 G/DL
ALP SERPL-CCNC: 79 U/L (ref 30–99)
ALP SERPL-CCNC: 80 U/L (ref 30–99)
ALT SERPL-CCNC: 17 U/L (ref 2–50)
ALT SERPL-CCNC: 18 U/L (ref 2–50)
ANION GAP SERPL CALC-SCNC: 12 MMOL/L (ref 7–16)
ANION GAP SERPL CALC-SCNC: 8 MMOL/L (ref 7–16)
AST SERPL-CCNC: 22 U/L (ref 12–45)
AST SERPL-CCNC: 24 U/L (ref 12–45)
BILIRUB SERPL-MCNC: 0.7 MG/DL (ref 0.1–1.5)
BILIRUB SERPL-MCNC: 0.7 MG/DL (ref 0.1–1.5)
BUN SERPL-MCNC: 19 MG/DL (ref 8–22)
BUN SERPL-MCNC: 21 MG/DL (ref 8–22)
CALCIUM ALBUM COR SERPL-MCNC: 8.7 MG/DL (ref 8.5–10.5)
CALCIUM ALBUM COR SERPL-MCNC: 8.8 MG/DL (ref 8.5–10.5)
CALCIUM SERPL-MCNC: 8.5 MG/DL (ref 8.5–10.5)
CALCIUM SERPL-MCNC: 8.7 MG/DL (ref 8.5–10.5)
CHLORIDE SERPL-SCNC: 100 MMOL/L (ref 96–112)
CHLORIDE SERPL-SCNC: 99 MMOL/L (ref 96–112)
CHOLEST SERPL-MCNC: 117 MG/DL (ref 100–199)
CHOLEST SERPL-MCNC: 120 MG/DL (ref 100–199)
CO2 SERPL-SCNC: 25 MMOL/L (ref 20–33)
CO2 SERPL-SCNC: 27 MMOL/L (ref 20–33)
CREAT SERPL-MCNC: 0.68 MG/DL (ref 0.5–1.4)
CREAT SERPL-MCNC: 0.73 MG/DL (ref 0.5–1.4)
CREAT UR-MCNC: 107.24 MG/DL
FASTING STATUS PATIENT QL REPORTED: NORMAL
FASTING STATUS PATIENT QL REPORTED: NORMAL
GFR SERPLBLD CREATININE-BSD FMLA CKD-EPI: 106 ML/MIN/1.73 M 2
GFR SERPLBLD CREATININE-BSD FMLA CKD-EPI: 108 ML/MIN/1.73 M 2
GLOBULIN SER CALC-MCNC: 3.9 G/DL (ref 1.9–3.5)
GLOBULIN SER CALC-MCNC: 4.7 G/DL (ref 1.9–3.5)
GLUCOSE SERPL-MCNC: 96 MG/DL (ref 65–99)
GLUCOSE SERPL-MCNC: 98 MG/DL (ref 65–99)
HDLC SERPL-MCNC: 33 MG/DL
HDLC SERPL-MCNC: 34 MG/DL
LDLC SERPL CALC-MCNC: 64 MG/DL
LDLC SERPL CALC-MCNC: 66 MG/DL
MICROALBUMIN UR-MCNC: 12.6 MG/DL
MICROALBUMIN/CREAT UR: 117 MG/G (ref 0–30)
POTASSIUM SERPL-SCNC: 4.3 MMOL/L (ref 3.6–5.5)
POTASSIUM SERPL-SCNC: 4.4 MMOL/L (ref 3.6–5.5)
PROT SERPL-MCNC: 7.9 G/DL (ref 6–8.2)
PROT SERPL-MCNC: 8.3 G/DL (ref 6–8.2)
SODIUM SERPL-SCNC: 134 MMOL/L (ref 135–145)
SODIUM SERPL-SCNC: 137 MMOL/L (ref 135–145)
TRIGL SERPL-MCNC: 101 MG/DL (ref 0–149)
TRIGL SERPL-MCNC: 99 MG/DL (ref 0–149)

## 2024-01-27 PROCEDURE — 80053 COMPREHEN METABOLIC PANEL: CPT | Mod: 91

## 2024-01-27 PROCEDURE — 36415 COLL VENOUS BLD VENIPUNCTURE: CPT

## 2024-01-27 PROCEDURE — 80061 LIPID PANEL: CPT | Mod: 91

## 2024-01-27 PROCEDURE — 82043 UR ALBUMIN QUANTITATIVE: CPT

## 2024-01-27 PROCEDURE — 80061 LIPID PANEL: CPT

## 2024-01-27 PROCEDURE — 80053 COMPREHEN METABOLIC PANEL: CPT

## 2024-01-27 PROCEDURE — 82570 ASSAY OF URINE CREATININE: CPT

## 2024-01-29 ENCOUNTER — NON-PROVIDER VISIT (OUTPATIENT)
Dept: CARDIOLOGY | Facility: MEDICAL CENTER | Age: 59
End: 2024-01-29
Attending: NURSE PRACTITIONER
Payer: COMMERCIAL

## 2024-01-29 DIAGNOSIS — E66.9 OBESITY (BMI 35.0-39.9 WITHOUT COMORBIDITY): ICD-10-CM

## 2024-01-29 DIAGNOSIS — E11.9 TYPE 2 DIABETES MELLITUS WITHOUT COMPLICATION, WITHOUT LONG-TERM CURRENT USE OF INSULIN (HCC): ICD-10-CM

## 2024-01-29 PROCEDURE — 99212 OFFICE O/P EST SF 10 MIN: CPT

## 2024-02-09 PROCEDURE — RXMED WILLOW AMBULATORY MEDICATION CHARGE: Performed by: NURSE PRACTITIONER

## 2024-02-09 PROCEDURE — RXMED WILLOW AMBULATORY MEDICATION CHARGE: Performed by: PHYSICIAN ASSISTANT

## 2024-02-12 PROCEDURE — RXMED WILLOW AMBULATORY MEDICATION CHARGE: Performed by: PHYSICIAN ASSISTANT

## 2024-02-16 ENCOUNTER — PHARMACY VISIT (OUTPATIENT)
Dept: PHARMACY | Facility: MEDICAL CENTER | Age: 59
End: 2024-02-16
Payer: COMMERCIAL

## 2024-02-26 ENCOUNTER — APPOINTMENT (OUTPATIENT)
Dept: MEDICAL GROUP | Facility: CLINIC | Age: 59
End: 2024-02-26
Payer: COMMERCIAL

## 2024-02-27 ENCOUNTER — TELEPHONE (OUTPATIENT)
Dept: CARDIOLOGY | Facility: MEDICAL CENTER | Age: 59
End: 2024-02-27
Payer: COMMERCIAL

## 2024-02-27 NOTE — TELEPHONE ENCOUNTER
Received EMR message in Cardiology, ran test claim and rejected for Prior Authorization is required. Prior Authorization for Accu-Chek Guide w/ Device Kit  (Quantity: 1, Days: 30) has been submitted via Cover My Meds: Key ((Key: [id number]) - [id number])    Insurance: [employer] - commercial    Will follow up in 24-48 business hours.

## 2024-03-04 ENCOUNTER — APPOINTMENT (OUTPATIENT)
Dept: MEDICAL GROUP | Facility: CLINIC | Age: 59
End: 2024-03-04
Payer: COMMERCIAL

## 2024-03-04 ENCOUNTER — NON-PROVIDER VISIT (OUTPATIENT)
Dept: CARDIOLOGY | Facility: MEDICAL CENTER | Age: 59
End: 2024-03-04
Attending: NURSE PRACTITIONER
Payer: COMMERCIAL

## 2024-03-04 ENCOUNTER — TELEPHONE (OUTPATIENT)
Dept: CARDIOLOGY | Facility: MEDICAL CENTER | Age: 59
End: 2024-03-04

## 2024-03-04 VITALS — WEIGHT: 315 LBS | BODY MASS INDEX: 54.92 KG/M2

## 2024-03-04 DIAGNOSIS — E11.9 TYPE 2 DIABETES MELLITUS WITHOUT COMPLICATION, WITHOUT LONG-TERM CURRENT USE OF INSULIN (HCC): Primary | ICD-10-CM

## 2024-03-04 PROCEDURE — 99212 OFFICE O/P EST SF 10 MIN: CPT | Performed by: INTERNAL MEDICINE

## 2024-03-04 RX ORDER — SEMAGLUTIDE 1.34 MG/ML
1 INJECTION, SOLUTION SUBCUTANEOUS
Qty: 3 ML | Refills: 1 | Status: SHIPPED | OUTPATIENT
Start: 2024-03-04

## 2024-03-04 ASSESSMENT — FIBROSIS 4 INDEX: FIB4 SCORE: 1.791337179005920395

## 2024-03-04 NOTE — TELEPHONE ENCOUNTER
Prior Authorization for Ozempic (1 MG/DOSE) 4MG/3ML pen-injectors (Quantity: 3, Days: 28) has been submitted via Cover My Meds: Key ([id number])    Insurance: [employer]    Will follow up in 24-48 business hours.

## 2024-03-04 NOTE — Clinical Note
Sung Maldonado!  Looks like you were working on a PA for this pt's DM testing supplies. Just curious if you had any updates on which brand is insurance preferred?  Thank you, Fabiana

## 2024-03-04 NOTE — PROGRESS NOTES
Patient Consult Note    Primary care physician: Marian Chang P.A.-C.    Reason for consult: Management of Controlled Type 2 Diabetes    HPI:  Deo Beard is a 58 y.o. old patient who comes in today for evaluation of above stated problem.     Allergies  Patient has no known allergies.    Current Diabetes Medication Regimen  SGLT-2 Inhibitor: dapagliflozin (Farxiga) 10 mg daily  GLP1RA: Ozempic 0.5 mg q 7 days    Previous Diabetes Medications and Reason for Discontinuation  Metformin - Dc'd d/t controlled disease    Potential Barriers to Care:  Adherence: N/a  Side effects: N/a  Affordability: No issues    SMBG  Pt has home glucometer and proper testing technique - Does not monitor; Rx for testing supplies sent at last visit, but pt has not received from the pharmacy.  Discussed BG Goals: FBG 80 - 130, 2hPP < 180, A1c < 7%    Hypoglycemia  Hypoglycemia awareness: Yes  Nocturnal hypoglycemia: None  Hypoglycemia:  None  Pt's treatment of Hypoglycemia  Discussed 15:15 Rule    Lifestyle  Current Exercise - No formal exercise, does walk around at work.  Exercise Goal - Discussed goal of 150 min/week. Encouraged more walking, but pt states he is dealing with a lot of things right now.    Dietary: decreased appetite since increasing Ozempic dose  Breakfast - Sausage and egg croissant  Lunch - Bologna sandwich w/ chips  Dinner - Varies. Typically meat and vegetable  Snacks - None typically  Drinks - Pepsi x 3 per day (regular - he's unwilling to DC), water    Preventative Management  BP regimen (ACEi/ARB): None  Statin: rosuvastatin (Crestor) 10 mg daily  Last Microalbumin/Cr:  Lab Results   Component Value Date/Time    MALBCRT 117 (H) 01/27/2024 07:41 AM    MICROALBUR 12.6 01/27/2024 07:41 AM     Last A1c:  Lab Results   Component Value Date/Time    HBA1C 6.0 (H) 11/25/2023 08:26 AM      Last Retinal Scan: Pt to schedule prior to f/u    Monofilament exam: Due - will complete at f/u visits    Labs  Lab Results    Component Value Date/Time    SODIUM 134 (L) 01/27/2024 07:41 AM    POTASSIUM 4.3 01/27/2024 07:41 AM    CHLORIDE 99 01/27/2024 07:41 AM    CO2 27 01/27/2024 07:41 AM    GLUCOSE 98 01/27/2024 07:41 AM    BUN 21 01/27/2024 07:41 AM    CREATININE 0.73 01/27/2024 07:41 AM    GLOMRATE 113 08/21/2023 01:56 PM     Lab Results   Component Value Date/Time    ALKPHOSPHAT 79 01/27/2024 07:41 AM    ASTSGOT 24 01/27/2024 07:41 AM    ALTSGPT 18 01/27/2024 07:41 AM    TBILIRUBIN 0.7 01/27/2024 07:41 AM    INR 0.92 12/26/2012 10:45 AM    ALBUMIN 4.0 01/27/2024 07:41 AM        Physical Examination:  Vital signs: Wt (!) 164 kg (361 lb 3.2 oz)   BMI 54.92 kg/m²  Body mass index is 54.92 kg/m².  Pt declined BP/HR    Assessment and Plan:    1. DM2  Discussed Goals: FBG 80 - 130, 2hPP < 180, a1c < 7.0%   Last A1c drawn on 11/25/23 was 6%, which is at goal  Previously sent in Rx for testing supplies as pt does not currently have one. Pt has not received supplies. Per chart review, Rx coordinator is working on determining if [employer] or [employer] would be insurance preferred. Will attempt to coordinate with Rx coordinator and [facility].  Pt referred to pharmacotherapy clinic to assist pt w/ continued DM control as well as obesity management.  Pt w/ hx of gastric sleeve. He did lose ~ 100 lbs, but unfortunately did regain the weight back.  Pt denies any side effects associated with the Ozempic. He is willing to increase the dose at this time    - Medication changes:  Increase Ozempic to 1 mg every 7 days  - Continue:  Farxiga 10 mg once daily     - Lifestyle changes:  Diet: Maximize lean proteins and veggies. Cut out/down on carbs. Avoid simple sugars.   Exercise: Start simple by walking daily and Increase as tolerated    Follow Up:  6 weeks (after three doses of Ozempic 1 mg)    Fabiana Villar, PharmD, BCACP    CC:   Marian Chang P.A.-C.

## 2024-03-08 ENCOUNTER — TELEPHONE (OUTPATIENT)
Dept: CARDIOLOGY | Facility: MEDICAL CENTER | Age: 59
End: 2024-03-08
Payer: COMMERCIAL

## 2024-03-08 PROCEDURE — RXMED WILLOW AMBULATORY MEDICATION CHARGE: Performed by: NURSE PRACTITIONER

## 2024-03-08 NOTE — TELEPHONE ENCOUNTER
Received ERX in Cardiology [facility], ran test claim and rejected for Prior Authorization is required. Prior Authorization for One Touch Ultra 2 Device (Quantity: 1, Days: 30) has been submitted via Cover My Meds: Key ([id number]) PA Case ID #: [id number]    Insurance: [employer] / [employer] - commercial     Outcome    This medication or product is on your plan's list of covered drugs. Prior authorization is not required at this time. If your pharmacy has questions regarding the processing of your prescription, please have them call the [employer] pharmacy help desk at [phone].     Notified [place] pharmacy of no PA needed and to process for One Touch Ultra 2 Device, Ultra 2 test strips, and Delica lancets for pharmacy coverage.

## 2024-03-12 ENCOUNTER — PHARMACY VISIT (OUTPATIENT)
Dept: PHARMACY | Facility: MEDICAL CENTER | Age: 59
End: 2024-03-12
Payer: COMMERCIAL

## 2024-04-01 PROCEDURE — RXMED WILLOW AMBULATORY MEDICATION CHARGE: Performed by: NURSE PRACTITIONER

## 2024-04-03 ENCOUNTER — PHARMACY VISIT (OUTPATIENT)
Dept: PHARMACY | Facility: MEDICAL CENTER | Age: 59
End: 2024-04-03
Payer: COMMERCIAL

## 2024-04-08 ENCOUNTER — APPOINTMENT (OUTPATIENT)
Dept: MEDICAL GROUP | Facility: CLINIC | Age: 59
End: 2024-04-08
Payer: COMMERCIAL

## 2024-04-13 ENCOUNTER — HOSPITAL ENCOUNTER (OUTPATIENT)
Dept: LAB | Facility: MEDICAL CENTER | Age: 59
End: 2024-04-13
Attending: STUDENT IN AN ORGANIZED HEALTH CARE EDUCATION/TRAINING PROGRAM
Payer: COMMERCIAL

## 2024-04-13 DIAGNOSIS — I50.32 CHRONIC HEART FAILURE WITH PRESERVED EJECTION FRACTION (HCC): ICD-10-CM

## 2024-04-13 DIAGNOSIS — I50.9 CHF (NYHA CLASS II, ACC/AHA STAGE C) (HCC): ICD-10-CM

## 2024-04-13 LAB
ANION GAP SERPL CALC-SCNC: 8 MMOL/L (ref 7–16)
BUN SERPL-MCNC: 18 MG/DL (ref 8–22)
CALCIUM SERPL-MCNC: 8.4 MG/DL (ref 8.5–10.5)
CHLORIDE SERPL-SCNC: 102 MMOL/L (ref 96–112)
CO2 SERPL-SCNC: 26 MMOL/L (ref 20–33)
CREAT SERPL-MCNC: 0.62 MG/DL (ref 0.5–1.4)
GFR SERPLBLD CREATININE-BSD FMLA CKD-EPI: 111 ML/MIN/1.73 M 2
GLUCOSE SERPL-MCNC: 85 MG/DL (ref 65–99)
POTASSIUM SERPL-SCNC: 4.2 MMOL/L (ref 3.6–5.5)
SODIUM SERPL-SCNC: 136 MMOL/L (ref 135–145)

## 2024-04-13 PROCEDURE — 80048 BASIC METABOLIC PNL TOTAL CA: CPT

## 2024-04-13 PROCEDURE — 36415 COLL VENOUS BLD VENIPUNCTURE: CPT

## 2024-04-15 ENCOUNTER — APPOINTMENT (OUTPATIENT)
Dept: CARDIOLOGY | Facility: MEDICAL CENTER | Age: 59
End: 2024-04-15
Attending: NURSE PRACTITIONER
Payer: COMMERCIAL

## 2024-04-23 ENCOUNTER — NON-PROVIDER VISIT (OUTPATIENT)
Dept: CARDIOLOGY | Facility: MEDICAL CENTER | Age: 59
End: 2024-04-23
Attending: NURSE PRACTITIONER
Payer: COMMERCIAL

## 2024-04-23 VITALS
WEIGHT: 315 LBS | HEIGHT: 68 IN | BODY MASS INDEX: 47.74 KG/M2 | HEART RATE: 78 BPM | SYSTOLIC BLOOD PRESSURE: 117 MMHG | DIASTOLIC BLOOD PRESSURE: 60 MMHG

## 2024-04-23 DIAGNOSIS — E11.9 TYPE 2 DIABETES MELLITUS WITHOUT COMPLICATION, WITHOUT LONG-TERM CURRENT USE OF INSULIN (HCC): ICD-10-CM

## 2024-04-23 DIAGNOSIS — I10 HTN (HYPERTENSION), MALIGNANT: ICD-10-CM

## 2024-04-23 PROCEDURE — 99212 OFFICE O/P EST SF 10 MIN: CPT

## 2024-04-23 PROCEDURE — RXMED WILLOW AMBULATORY MEDICATION CHARGE: Performed by: NURSE PRACTITIONER

## 2024-04-23 RX ORDER — CARVEDILOL 25 MG/1
25 TABLET ORAL 2 TIMES DAILY WITH MEALS
Qty: 180 TABLET | Refills: 1 | Status: SHIPPED | OUTPATIENT
Start: 2024-04-23

## 2024-04-23 RX ORDER — SEMAGLUTIDE 2.68 MG/ML
2 INJECTION, SOLUTION SUBCUTANEOUS
Qty: 9 ML | Refills: 1 | Status: SHIPPED | OUTPATIENT
Start: 2024-04-23

## 2024-04-23 ASSESSMENT — FIBROSIS 4 INDEX: FIB4 SCORE: 1.791337179005920395

## 2024-04-23 NOTE — PROGRESS NOTES
"CHF Pharmacotherapy visit:    Deo Beard  1965    Informed written consent was given on: 23    HPI  Deo eBard is here for CHF and past DM   Pertinent Interval History since last visit:   F/u appt   Most recent EF:  Echo from [facility] showed an EF of 45 to 50% from 2022,   Echo 2023 showed improvement to 65%     Immunization History   Administered Date(s) Administered    Tdap Vaccine 2018       SOCIAL HISTORY  Social History     Tobacco Use   Smoking Status Former    Current packs/day: 0.00    Average packs/day: 2.5 packs/day for 20.0 years (50.0 ttl pk-yrs)    Types: Cigarettes    Start date: 1992    Quit date: 2012    Years since quittin.5   Smokeless Tobacco Never        Recent Imaging Studies:    None since last visit    DATA REVIEW  INR   Date Value Ref Range Status   2012 0.92 0.87 - 1.13 Final     Comment:     INR - Non-therapeutic Reference Range: 0.87-1.13  INR - Therapeutic Reference Range: 2.0-4.0     No results found for: \"POCINR\"   Lab Results   Component Value Date/Time    HBA1C 6.0 (H) 2023 08:26 AM          Lab Results   Component Value Date/Time    CHOLSTRLTOT 120 2024 07:41 AM    LDL 66 2024 07:41 AM    HDL 34 (A) 2024 07:41 AM    TRIGLYCERIDE 101 2024 07:41 AM       Lab Results   Component Value Date/Time    SODIUM 136 2024 08:20 AM    POTASSIUM 4.2 2024 08:20 AM    CHLORIDE 102 2024 08:20 AM    CO2 26 2024 08:20 AM    GLUCOSE 85 2024 08:20 AM    BUN 18 2024 08:20 AM    CREATININE 0.62 2024 08:20 AM    GLOMRATE 113 2023 01:56 PM     Lab Results   Component Value Date/Time    ALKPHOSPHAT 79 2024 07:41 AM    ASTSGOT 24 2024 07:41 AM    ALTSGPT 18 2024 07:41 AM    TBILIRUBIN 0.7 2024 07:41 AM    INR 0.92 2012 10:45 AM    ALBUMIN 4.0 2024 07:41 AM      Lab Results   Component Value Date/Time    RBC 4.50 2023 01:56 PM    RBC " 5.18 06/17/2023 09:25 AM    HEMOGLOBIN 13.6 08/21/2023 01:56 PM    HEMOGLOBIN 15.6 06/17/2023 09:25 AM    HEMATOCRIT 40.1 08/21/2023 01:56 PM    HEMATOCRIT 46.7 06/17/2023 09:25 AM    MCV 89.0 08/21/2023 01:56 PM    MCV 90.2 06/17/2023 09:25 AM    MCH 30.2 08/21/2023 01:56 PM    MCH 30.1 06/17/2023 09:25 AM    MCHC 33.9 08/21/2023 01:56 PM    MCHC 33.4 06/17/2023 09:25 AM    MPV 8.8 08/21/2023 01:56 PM    MPV 11.5 06/17/2023 09:25 AM      Lab Results   Component Value Date/Time    MALBCRT 117 (H) 01/27/2024 07:41 AM    MICROALBUR 12.6 01/27/2024 07:41 AM       Significant changes to laboratory values since last visit that require repeat labs:  N/a  Other Pertinent Blood Work:   N/a      Current Outpatient Medications:     Ozempic (2 MG/DOSE), 2 mg, Subcutaneous, Q7 DAYS    Blood Glucose Monitor System, Test blood sugar as recommended by provider.    glucose blood, Use one strip to test blood sugar once daily early morning before first meal.    Lancets, Use one lancet to test blood sugar once daily early morning before first meal.    Alcohol Swabs, Wipe site with prep pad prior to injection.    carvedilol, 12.5 mg, Oral, BID WITH MEALS    diclofenac sodium, APPLY 2 GRAMS TOPICALLY 4 TIMES DAILY AS NEEDED FOR PAIN    Vitamin D, 1 Capsule, Oral, DAILY    potassium chloride SA, 20 mEq, Oral, DAILY    furosemide, TAKE 1 TABLET BY MOUTH ONCE A DAY ONLY IF NEEDED FOR LEG SWELLING.    DULoxetine, Take 1 cap(s) orally every day.    rosuvastatin, 10 mg, Oral, Q EVENING    spironolactone, 25 mg, Oral, DAILY    dapagliflozin propanediol, 10 mg, Oral, DAILY    aspirin, 81 mg, Oral, DAILY    NON SPECIFIED, Knee brace    Vitals:    04/23/24 1603   BP: 117/60   Pulse: 78       ASSESSMENT AND PLAN    CHF & HTN:  PUMP line number [phone] (PUMP) reviewed with patient  Is blood pressure at less <130/80 in clinic today: y  Home BP and HR:  <120/80, HR <80  Lower extremity edema present: very little   Shortness of breath at rest: some    HINOJOSA, dyspnea on exertion: very little   Orthopnea: no  PND, paroxysmal nocturnal dyspnea:  no  Change in weight: increase   Exercise habits: no regular exercise program   Current Adherence to CHF and other therapies: Complete  Kidney function:   Latest Reference Range & Units 01/27/24 07:40 01/27/24 07:41 04/13/24 08:20   Creatinine 0.50 - 1.40 mg/dL 0.68 0.73 0.62   GFR (CKD-EPI) >60 mL/min/1.73 m 2 108 106 111     CHF medications:  Entresto or ACE/ARB: declined due to anxiety with past use of losartan   Beta blocker: increase to Carvedilol 25mg BID due to HR of 78  Diuretic:continue currently off  lasix 20mg daily, last dose was 2 months ago.  He is still on KCL 20meq, but K is wnl, so will continue    Aldosterone antagonist: continue spironolactone 25mg   SGLT-2 Inhibitor:  continue Farxiga 10mg daily     Weight:  Goal weight about 270lb   Increase to Ozempic 2mg every 7 days     Lipids:  LDL at goal of <70  lipid medication:   Continue Crestor 10mg daily       Lifestyle   Lifestyle Recommendations From Today's Visit:   Continue to eat DASH/MED style diet.   Continue to exercise as tolerated.  Salt restriction to <2300mg daily       Blood Work Ordered At Today's visit: labs as ordered   Studies Ordered at Todays Visit: no   Follow-Up: 12 week(s)    Vlad Sher, PharmD  [facility]  Phone: [phone], Fax: [phone]    This note was created using voice recognition software (Dragon). The accuracy of the dictation is limited by the abilities of the software. I have reviewed the note prior to signing, however some errors in grammar and context are still possible. If you have any questions related to this note please do not hesitate to contact our office.     CC:  AARON Davis Ruth, A.P.R.N.

## 2024-05-06 ENCOUNTER — APPOINTMENT (OUTPATIENT)
Dept: MEDICAL GROUP | Facility: CLINIC | Age: 59
End: 2024-05-06
Payer: COMMERCIAL

## 2024-05-06 VITALS
RESPIRATION RATE: 18 BRPM | OXYGEN SATURATION: 97 % | SYSTOLIC BLOOD PRESSURE: 130 MMHG | BODY MASS INDEX: 47.74 KG/M2 | DIASTOLIC BLOOD PRESSURE: 80 MMHG | WEIGHT: 315 LBS | TEMPERATURE: 97.7 F | HEART RATE: 74 BPM | HEIGHT: 68 IN

## 2024-05-06 DIAGNOSIS — E11.9 TYPE 2 DIABETES MELLITUS WITHOUT COMPLICATION, WITHOUT LONG-TERM CURRENT USE OF INSULIN (HCC): ICD-10-CM

## 2024-05-06 DIAGNOSIS — I50.9 CONGESTIVE HEART FAILURE, UNSPECIFIED HF CHRONICITY, UNSPECIFIED HEART FAILURE TYPE (HCC): ICD-10-CM

## 2024-05-06 DIAGNOSIS — R20.2 PARESTHESIA: ICD-10-CM

## 2024-05-06 DIAGNOSIS — E78.2 MIXED HYPERLIPIDEMIA: ICD-10-CM

## 2024-05-06 DIAGNOSIS — I10 PRIMARY HYPERTENSION: ICD-10-CM

## 2024-05-06 PROCEDURE — RXMED WILLOW AMBULATORY MEDICATION CHARGE: Performed by: NURSE PRACTITIONER

## 2024-05-06 PROCEDURE — 99214 OFFICE O/P EST MOD 30 MIN: CPT | Performed by: PHYSICIAN ASSISTANT

## 2024-05-06 PROCEDURE — 92250 FUNDUS PHOTOGRAPHY W/I&R: CPT | Mod: 26 | Performed by: PHYSICIAN ASSISTANT

## 2024-05-06 PROCEDURE — 3075F SYST BP GE 130 - 139MM HG: CPT | Performed by: PHYSICIAN ASSISTANT

## 2024-05-06 PROCEDURE — 3079F DIAST BP 80-89 MM HG: CPT | Performed by: PHYSICIAN ASSISTANT

## 2024-05-06 ASSESSMENT — FIBROSIS 4 INDEX: FIB4 SCORE: 1.791337179005920395

## 2024-05-06 ASSESSMENT — PATIENT HEALTH QUESTIONNAIRE - PHQ9: CLINICAL INTERPRETATION OF PHQ2 SCORE: 0

## 2024-05-06 NOTE — PROGRESS NOTES
cc: diabetes    Subjective:     Deo Beard is a 58 y.o. male presenting for diabetes    Patient presents to the office for diabetes.  Patient did have labs drawn before coming in to be seen today.  Along with type 2 diabetes, patient also has mixed hyperlipidemia, hypertension, and congestive heart failure.  He does see cardiology approximately every 6 months.    Patient indicates that he has been having paresthesia in both hands and wrist for several months.  He rides a motorcycle and he has had a difficult time riding motorcycle with his symptoms.    Review of systems:  See above.   Denies any symptoms unless previously indicated.        Current Outpatient Medications:     Semaglutide, 2 MG/DOSE, (OZEMPIC, 2 MG/DOSE,) 8 MG/3ML Solution Pen-injector, Inject 2 mg under the skin every 7 days. Mail to pt, Disp: 9 mL, Rfl: 1    carvedilol (COREG) 25 MG Tab, Take 1 Tablet by mouth 2 times a day with meals., Disp: 180 Tablet, Rfl: 1    Blood Glucose Monitoring Suppl (BLOOD GLUCOSE MONITOR SYSTEM) w/Device Kit, Test blood sugar as recommended by provider., Disp: 1 Kit, Rfl: 0    glucose blood strip, Use one strip to test blood sugar once daily early morning before first meal., Disp: 100 Strip, Rfl: 3    Lancets, Use one lancet to test blood sugar once daily early morning before first meal., Disp: 100 Each, Rfl: 3    Alcohol Swabs, Wipe site with prep pad prior to injection., Disp: 100 Each, Rfl: 3    diclofenac sodium (VOLTAREN) 1 % Gel, APPLY 2 GRAMS TOPICALLY 4 TIMES DAILY AS NEEDED FOR PAIN, Disp: 200 g, Rfl: 0    Cholecalciferol (VITAMIN D) 125 MCG (5000 UT) Cap, Take 1 Capsule by mouth every day., Disp: 90 Capsule, Rfl: 2    potassium chloride SA (KDUR) 20 MEQ Tab CR, Take 1 Tablet by mouth every day., Disp: 90 Tablet, Rfl: 2    DULoxetine (CYMBALTA) 30 MG Cap DR Particles, Take 1 cap(s) orally every day., Disp: 30 Capsule, Rfl: 0    rosuvastatin (CRESTOR) 10 MG Tab, Take 1 Tablet by mouth every evening.,  "Disp: 90 Tablet, Rfl: 3    spironolactone (ALDACTONE) 25 MG Tab, Take 1 Tablet by mouth every day., Disp: 90 Tablet, Rfl: 3    dapagliflozin propanediol (FARXIGA) 10 MG Tab, Take 1 Tablet by mouth every day., Disp: 90 Tablet, Rfl: 2    aspirin 81 MG EC tablet, Take 81 mg by mouth every day., Disp: , Rfl:     NON SPECIFIED, Knee brace, Disp: 1 Each, Rfl: 0    furosemide (LASIX) 20 MG Tab, TAKE 1 TABLET BY MOUTH ONCE A DAY ONLY IF NEEDED FOR LEG SWELLING. (Patient not taking: Reported on 5/6/2024), Disp: 90 Tablet, Rfl: 2    Allergies, past medical history, past surgical history, family history, social history reviewed and updated    Objective:     Vitals: /80 (BP Location: Left arm, Patient Position: Sitting, BP Cuff Size: Large adult)   Pulse 74   Temp 36.5 °C (97.7 °F) (Temporal)   Resp 18   Ht 1.727 m (5' 8\")   Wt (!) 158 kg (348 lb 12.3 oz)   SpO2 97%   BMI 53.03 kg/m²   General: Alert, pleasant, NAD  EYES:   PERRL, EOMI, no icterus or pallor.  Conjunctivae and lids normal.   HENT:  Normocephalic.  External ears normal.   Neck supple.   Respiratory: Normal respiratory effort.    Abdomen: obese  Skin: Warm, dry, no rashes.  Musculoskeletal: Gait is normal.  Moves all extremities well.    Extremities: Monofilament testing with a 10 gram force: sensation intact: intact bilaterally  Visual Inspection: Feet with maceration, ulcers, fissures.  Pedal pulses: decreased bilaterally .  Varicosities present.  Neurological: No tremors, sensation grossly intact, pCN2-12 intact.  Psych:  Affect/mood is normal, judgement is good, memory is intact, grooming is appropriate.     Latest Reference Range & Units 01/27/24 07:40 01/27/24 07:41 04/13/24 08:20   Sodium 135 - 145 mmol/L 137 134 (L) 136   Potassium 3.6 - 5.5 mmol/L 4.4 4.3 4.2   Chloride 96 - 112 mmol/L 100 99 102   Co2 20 - 33 mmol/L 25 27 26   Anion Gap 7.0 - 16.0  12.0 8.0 8.0   Glucose 65 - 99 mg/dL 96 98 85   Bun 8 - 22 mg/dL 19 21 18   Creatinine 0.50 - " 1.40 mg/dL 0.68 0.73 0.62   GFR (CKD-EPI) >60 mL/min/1.73 m 2 108 106 111   Calcium 8.5 - 10.5 mg/dL 8.5 8.7 8.4 (L)   Correct Calcium 8.5 - 10.5 mg/dL 8.8 8.7    AST(SGOT) 12 - 45 U/L 22 24    ALT(SGPT) 2 - 50 U/L 17 18    Alkaline Phosphatase 30 - 99 U/L 80 79    Total Bilirubin 0.1 - 1.5 mg/dL 0.7 0.7    Albumin 3.2 - 4.9 g/dL 3.6 4.0    Total Protein 6.0 - 8.2 g/dL 8.3 (H) 7.9    Globulin 1.9 - 3.5 g/dL 4.7 (H) 3.9 (H)    A-G Ratio g/dL 0.8 1.0    Fasting Status  Fasting Fasting    Cholesterol,Tot 100 - 199 mg/dL 117 120    Triglycerides 0 - 149 mg/dL 99 101    HDL >=40 mg/dL 33 ! 34 !    LDL <100 mg/dL 64 66    Micro Alb Creat Ratio 0 - 30 mg/g  117 (H)    Creatinine, Urine mg/dL  107.24    Microalbumin, Urine Random mg/dL  12.6    (L): Data is abnormally low  (H): Data is abnormally high  !: Data is abnormal    Assessment/Plan:     Deo was seen today for follow-up.    Diagnoses and all orders for this visit:    Type 2 diabetes mellitus without complication, without long-term current use of insulin (HCC)  -     Diabetic Monofilament LE Exam  -     POCT Retinal Eye Exam  -     Lipid Profile; Future  -     Comp Metabolic Panel; Future  -     HEMOGLOBIN A1C; Future  Mixed hyperlipidemia  -     Lipid Profile; Future  -     Comp Metabolic Panel; Future  Primary hypertension  -     Lipid Profile; Future  -     Comp Metabolic Panel; Future  Congestive heart failure, unspecified HF chronicity, unspecified heart failure type (HCC)    Labs are stable.  Continue with current medications.  Repeat labs in approximately 6 to 9 months.    Paresthesia    Will try bilateral wrist splints to see if this helps.  If no improvement, may need a nerve conduction study.        Return in about 6 months (around 11/6/2024), or if symptoms worsen or fail to improve, for 6-9 months.    Please note that this dictation was created using voice recognition software. I have made every reasonable attempt to correct obvious errors, but expect  that there are errors of grammar and possible content that I did not discover before finalizing note.

## 2024-05-09 PROCEDURE — RXMED WILLOW AMBULATORY MEDICATION CHARGE: Performed by: NURSE PRACTITIONER

## 2024-05-09 PROCEDURE — RXMED WILLOW AMBULATORY MEDICATION CHARGE: Performed by: PHYSICIAN ASSISTANT

## 2024-05-13 ENCOUNTER — PHARMACY VISIT (OUTPATIENT)
Dept: PHARMACY | Facility: MEDICAL CENTER | Age: 59
End: 2024-05-13
Payer: COMMERCIAL

## 2024-05-14 ENCOUNTER — TELEPHONE (OUTPATIENT)
Dept: MEDICAL GROUP | Facility: CLINIC | Age: 59
End: 2024-05-14
Payer: COMMERCIAL

## 2024-05-14 ENCOUNTER — PHARMACY VISIT (OUTPATIENT)
Dept: PHARMACY | Facility: MEDICAL CENTER | Age: 59
End: 2024-05-14
Payer: COMMERCIAL

## 2024-05-24 NOTE — TELEPHONE ENCOUNTER
Per [facility], records have been requested from [facility]. Fax confirmation has been received and sent to scan through [employer].       Pending records    asymptomatic

## 2024-06-08 ENCOUNTER — HOSPITAL ENCOUNTER (OUTPATIENT)
Dept: LAB | Facility: MEDICAL CENTER | Age: 59
End: 2024-06-08
Attending: NURSE PRACTITIONER
Payer: COMMERCIAL

## 2024-06-08 DIAGNOSIS — I10 PRIMARY HYPERTENSION: ICD-10-CM

## 2024-06-08 DIAGNOSIS — E78.2 MIXED HYPERLIPIDEMIA: ICD-10-CM

## 2024-06-08 DIAGNOSIS — E11.9 TYPE 2 DIABETES MELLITUS WITHOUT COMPLICATION, WITHOUT LONG-TERM CURRENT USE OF INSULIN (HCC): ICD-10-CM

## 2024-06-08 LAB
ALBUMIN SERPL BCP-MCNC: 3.8 G/DL (ref 3.2–4.9)
ALBUMIN/GLOB SERPL: 1 G/DL
ALP SERPL-CCNC: 77 U/L (ref 30–99)
ALT SERPL-CCNC: 13 U/L (ref 2–50)
ANION GAP SERPL CALC-SCNC: 10 MMOL/L (ref 7–16)
AST SERPL-CCNC: 19 U/L (ref 12–45)
BILIRUB SERPL-MCNC: 0.6 MG/DL (ref 0.1–1.5)
BUN SERPL-MCNC: 25 MG/DL (ref 8–22)
CALCIUM ALBUM COR SERPL-MCNC: 9 MG/DL (ref 8.5–10.5)
CALCIUM SERPL-MCNC: 8.8 MG/DL (ref 8.5–10.5)
CHLORIDE SERPL-SCNC: 102 MMOL/L (ref 96–112)
CHOLEST SERPL-MCNC: 120 MG/DL (ref 100–199)
CO2 SERPL-SCNC: 26 MMOL/L (ref 20–33)
CREAT SERPL-MCNC: 0.63 MG/DL (ref 0.5–1.4)
CREAT UR-MCNC: 199.13 MG/DL
EST. AVERAGE GLUCOSE BLD GHB EST-MCNC: 123 MG/DL
GFR SERPLBLD CREATININE-BSD FMLA CKD-EPI: 110 ML/MIN/1.73 M 2
GLOBULIN SER CALC-MCNC: 3.8 G/DL (ref 1.9–3.5)
GLUCOSE SERPL-MCNC: 93 MG/DL (ref 65–99)
HBA1C MFR BLD: 5.9 % (ref 4–5.6)
HDLC SERPL-MCNC: 32 MG/DL
LDLC SERPL CALC-MCNC: 64 MG/DL
MICROALBUMIN UR-MCNC: 8.8 MG/DL
MICROALBUMIN/CREAT UR: 44 MG/G (ref 0–30)
POTASSIUM SERPL-SCNC: 4.1 MMOL/L (ref 3.6–5.5)
PROT SERPL-MCNC: 7.6 G/DL (ref 6–8.2)
SODIUM SERPL-SCNC: 138 MMOL/L (ref 135–145)
TRIGL SERPL-MCNC: 119 MG/DL (ref 0–149)

## 2024-06-08 PROCEDURE — 82043 UR ALBUMIN QUANTITATIVE: CPT

## 2024-06-08 PROCEDURE — 82570 ASSAY OF URINE CREATININE: CPT

## 2024-06-08 PROCEDURE — 80061 LIPID PANEL: CPT

## 2024-06-08 PROCEDURE — 83036 HEMOGLOBIN GLYCOSYLATED A1C: CPT

## 2024-06-08 PROCEDURE — 36415 COLL VENOUS BLD VENIPUNCTURE: CPT

## 2024-06-08 PROCEDURE — 80053 COMPREHEN METABOLIC PANEL: CPT

## 2024-06-10 ENCOUNTER — PHARMACY VISIT (OUTPATIENT)
Dept: PHARMACY | Facility: MEDICAL CENTER | Age: 59
End: 2024-06-10
Payer: COMMERCIAL

## 2024-06-10 PROCEDURE — RXMED WILLOW AMBULATORY MEDICATION CHARGE: Performed by: NURSE PRACTITIONER

## 2024-06-12 PROCEDURE — RXMED WILLOW AMBULATORY MEDICATION CHARGE: Performed by: PHYSICIAN ASSISTANT

## 2024-06-13 ENCOUNTER — PHARMACY VISIT (OUTPATIENT)
Dept: PHARMACY | Facility: MEDICAL CENTER | Age: 59
End: 2024-06-13
Payer: COMMERCIAL

## 2024-07-08 ENCOUNTER — OFFICE VISIT (OUTPATIENT)
Dept: CARDIOLOGY | Facility: MEDICAL CENTER | Age: 59
End: 2024-07-08
Attending: STUDENT IN AN ORGANIZED HEALTH CARE EDUCATION/TRAINING PROGRAM
Payer: COMMERCIAL

## 2024-07-08 ENCOUNTER — TELEPHONE (OUTPATIENT)
Dept: CARDIOLOGY | Facility: MEDICAL CENTER | Age: 59
End: 2024-07-08
Payer: COMMERCIAL

## 2024-07-08 ENCOUNTER — NON-PROVIDER VISIT (OUTPATIENT)
Dept: CARDIOLOGY | Facility: MEDICAL CENTER | Age: 59
End: 2024-07-08
Attending: NURSE PRACTITIONER
Payer: COMMERCIAL

## 2024-07-08 VITALS
DIASTOLIC BLOOD PRESSURE: 68 MMHG | HEART RATE: 85 BPM | SYSTOLIC BLOOD PRESSURE: 100 MMHG | RESPIRATION RATE: 18 BRPM | OXYGEN SATURATION: 91 % | BODY MASS INDEX: 47.74 KG/M2 | WEIGHT: 315 LBS | HEIGHT: 68 IN

## 2024-07-08 DIAGNOSIS — E78.5 DYSLIPIDEMIA: ICD-10-CM

## 2024-07-08 DIAGNOSIS — E11.9 TYPE 2 DIABETES MELLITUS WITHOUT COMPLICATION, WITHOUT LONG-TERM CURRENT USE OF INSULIN (HCC): ICD-10-CM

## 2024-07-08 DIAGNOSIS — I50.32 CHRONIC HEART FAILURE WITH PRESERVED EJECTION FRACTION (HCC): ICD-10-CM

## 2024-07-08 DIAGNOSIS — I47.10 PAROXYSMAL SVT (SUPRAVENTRICULAR TACHYCARDIA) (HCC): ICD-10-CM

## 2024-07-08 DIAGNOSIS — I20.9 ANGINA PECTORIS (HCC): ICD-10-CM

## 2024-07-08 DIAGNOSIS — I50.9 CHF (NYHA CLASS II, ACC/AHA STAGE C) (HCC): ICD-10-CM

## 2024-07-08 DIAGNOSIS — I25.83 CORONARY ARTERY DISEASE DUE TO LIPID RICH PLAQUE: ICD-10-CM

## 2024-07-08 DIAGNOSIS — I25.10 CORONARY ARTERY DISEASE DUE TO LIPID RICH PLAQUE: ICD-10-CM

## 2024-07-08 DIAGNOSIS — I10 ESSENTIAL HYPERTENSION: ICD-10-CM

## 2024-07-08 PROCEDURE — 99215 OFFICE O/P EST HI 40 MIN: CPT | Performed by: STUDENT IN AN ORGANIZED HEALTH CARE EDUCATION/TRAINING PROGRAM

## 2024-07-08 PROCEDURE — 3074F SYST BP LT 130 MM HG: CPT | Performed by: STUDENT IN AN ORGANIZED HEALTH CARE EDUCATION/TRAINING PROGRAM

## 2024-07-08 PROCEDURE — 3078F DIAST BP <80 MM HG: CPT | Performed by: STUDENT IN AN ORGANIZED HEALTH CARE EDUCATION/TRAINING PROGRAM

## 2024-07-08 PROCEDURE — 99213 OFFICE O/P EST LOW 20 MIN: CPT | Performed by: STUDENT IN AN ORGANIZED HEALTH CARE EDUCATION/TRAINING PROGRAM

## 2024-07-08 PROCEDURE — RXMED WILLOW AMBULATORY MEDICATION CHARGE: Performed by: NURSE PRACTITIONER

## 2024-07-08 PROCEDURE — RXMED WILLOW AMBULATORY MEDICATION CHARGE: Performed by: STUDENT IN AN ORGANIZED HEALTH CARE EDUCATION/TRAINING PROGRAM

## 2024-07-08 PROCEDURE — 99212 OFFICE O/P EST SF 10 MIN: CPT | Performed by: STUDENT IN AN ORGANIZED HEALTH CARE EDUCATION/TRAINING PROGRAM

## 2024-07-08 RX ORDER — ROSUVASTATIN CALCIUM 20 MG/1
20 TABLET, COATED ORAL EVERY EVENING
Qty: 90 TABLET | Refills: 3 | Status: SHIPPED | OUTPATIENT
Start: 2024-07-08

## 2024-07-08 ASSESSMENT — ENCOUNTER SYMPTOMS
WEAKNESS: 0
SYNCOPE: 0
ABDOMINAL PAIN: 0
DYSPNEA ON EXERTION: 0
FOCAL WEAKNESS: 0
ORTHOPNEA: 0
SHORTNESS OF BREATH: 0
NEAR-SYNCOPE: 0
COUGH: 0
DIARRHEA: 0
NAUSEA: 0
PND: 0
PALPITATIONS: 0
DIZZINESS: 0
NIGHT SWEATS: 0
WHEEZING: 0
VOMITING: 0
IRREGULAR HEARTBEAT: 0
FEVER: 0

## 2024-07-08 ASSESSMENT — FIBROSIS 4 INDEX: FIB4 SCORE: 1.67

## 2024-07-10 ENCOUNTER — PHARMACY VISIT (OUTPATIENT)
Dept: PHARMACY | Facility: MEDICAL CENTER | Age: 59
End: 2024-07-10
Payer: COMMERCIAL

## 2024-07-15 ENCOUNTER — APPOINTMENT (OUTPATIENT)
Dept: ADMISSIONS | Facility: MEDICAL CENTER | Age: 59
End: 2024-07-15
Attending: INTERNAL MEDICINE
Payer: COMMERCIAL

## 2024-07-18 ENCOUNTER — PRE-ADMISSION TESTING (OUTPATIENT)
Dept: ADMISSIONS | Facility: MEDICAL CENTER | Age: 59
End: 2024-07-18
Attending: INTERNAL MEDICINE
Payer: COMMERCIAL

## 2024-07-31 ENCOUNTER — HOSPITAL ENCOUNTER (OUTPATIENT)
Facility: MEDICAL CENTER | Age: 59
End: 2024-07-31
Attending: INTERNAL MEDICINE | Admitting: INTERNAL MEDICINE
Payer: COMMERCIAL

## 2024-07-31 ENCOUNTER — APPOINTMENT (OUTPATIENT)
Dept: CARDIOLOGY | Facility: MEDICAL CENTER | Age: 59
End: 2024-07-31
Attending: STUDENT IN AN ORGANIZED HEALTH CARE EDUCATION/TRAINING PROGRAM
Payer: COMMERCIAL

## 2024-07-31 VITALS
DIASTOLIC BLOOD PRESSURE: 73 MMHG | WEIGHT: 315 LBS | BODY MASS INDEX: 47.74 KG/M2 | TEMPERATURE: 96.9 F | OXYGEN SATURATION: 92 % | SYSTOLIC BLOOD PRESSURE: 138 MMHG | RESPIRATION RATE: 16 BRPM | HEIGHT: 68 IN | HEART RATE: 68 BPM

## 2024-07-31 DIAGNOSIS — I25.83 CORONARY ARTERY DISEASE DUE TO LIPID RICH PLAQUE: ICD-10-CM

## 2024-07-31 DIAGNOSIS — I20.9 ANGINA PECTORIS (HCC): ICD-10-CM

## 2024-07-31 DIAGNOSIS — I25.10 CORONARY ARTERY DISEASE DUE TO LIPID RICH PLAQUE: ICD-10-CM

## 2024-07-31 LAB
ALBUMIN SERPL BCP-MCNC: 3.8 G/DL (ref 3.2–4.9)
ALBUMIN/GLOB SERPL: 1.1 G/DL
ALP SERPL-CCNC: 73 U/L (ref 30–99)
ALT SERPL-CCNC: 17 U/L (ref 2–50)
ANION GAP SERPL CALC-SCNC: 10 MMOL/L (ref 7–16)
APTT PPP: 27.4 SEC (ref 24.7–36)
AST SERPL-CCNC: 20 U/L (ref 12–45)
BILIRUB SERPL-MCNC: 0.5 MG/DL (ref 0.1–1.5)
BUN SERPL-MCNC: 16 MG/DL (ref 8–22)
CALCIUM ALBUM COR SERPL-MCNC: 8.9 MG/DL (ref 8.5–10.5)
CALCIUM SERPL-MCNC: 8.7 MG/DL (ref 8.5–10.5)
CHLORIDE SERPL-SCNC: 103 MMOL/L (ref 96–112)
CO2 SERPL-SCNC: 25 MMOL/L (ref 20–33)
CREAT SERPL-MCNC: 0.6 MG/DL (ref 0.5–1.4)
EKG IMPRESSION: NORMAL
ERYTHROCYTE [DISTWIDTH] IN BLOOD BY AUTOMATED COUNT: 47.2 FL (ref 35.9–50)
GFR SERPLBLD CREATININE-BSD FMLA CKD-EPI: 112 ML/MIN/1.73 M 2
GLOBULIN SER CALC-MCNC: 3.5 G/DL (ref 1.9–3.5)
GLUCOSE SERPL-MCNC: 87 MG/DL (ref 65–99)
HCT VFR BLD AUTO: 44.6 % (ref 42–52)
HGB BLD-MCNC: 14.7 G/DL (ref 14–18)
INR PPP: 0.93 (ref 0.87–1.13)
MCH RBC QN AUTO: 29.9 PG (ref 27–33)
MCHC RBC AUTO-ENTMCNC: 33 G/DL (ref 32.3–36.5)
MCV RBC AUTO: 90.8 FL (ref 81.4–97.8)
PLATELET # BLD AUTO: 190 K/UL (ref 164–446)
PMV BLD AUTO: 11.1 FL (ref 9–12.9)
POTASSIUM SERPL-SCNC: 3.9 MMOL/L (ref 3.6–5.5)
PROT SERPL-MCNC: 7.3 G/DL (ref 6–8.2)
PROTHROMBIN TIME: 12.6 SEC (ref 12–14.6)
RBC # BLD AUTO: 4.91 M/UL (ref 4.7–6.1)
SODIUM SERPL-SCNC: 138 MMOL/L (ref 135–145)
WBC # BLD AUTO: 9.1 K/UL (ref 4.8–10.8)

## 2024-07-31 PROCEDURE — 93458 L HRT ARTERY/VENTRICLE ANGIO: CPT | Mod: 26 | Performed by: INTERNAL MEDICINE

## 2024-07-31 PROCEDURE — 700101 HCHG RX REV CODE 250

## 2024-07-31 PROCEDURE — 99152 MOD SED SAME PHYS/QHP 5/>YRS: CPT | Performed by: INTERNAL MEDICINE

## 2024-07-31 PROCEDURE — 93005 ELECTROCARDIOGRAM TRACING: CPT | Performed by: STUDENT IN AN ORGANIZED HEALTH CARE EDUCATION/TRAINING PROGRAM

## 2024-07-31 PROCEDURE — 93010 ELECTROCARDIOGRAM REPORT: CPT | Performed by: INTERNAL MEDICINE

## 2024-07-31 PROCEDURE — 160002 HCHG RECOVERY MINUTES (STAT)

## 2024-07-31 PROCEDURE — 700117 HCHG RX CONTRAST REV CODE 255: Performed by: INTERNAL MEDICINE

## 2024-07-31 PROCEDURE — 700111 HCHG RX REV CODE 636 W/ 250 OVERRIDE (IP): Mod: JZ

## 2024-07-31 PROCEDURE — 99152 MOD SED SAME PHYS/QHP 5/>YRS: CPT

## 2024-07-31 PROCEDURE — 700102 HCHG RX REV CODE 250 W/ 637 OVERRIDE(OP)

## 2024-07-31 PROCEDURE — 80053 COMPREHEN METABOLIC PANEL: CPT

## 2024-07-31 PROCEDURE — 85730 THROMBOPLASTIN TIME PARTIAL: CPT

## 2024-07-31 PROCEDURE — 85027 COMPLETE CBC AUTOMATED: CPT

## 2024-07-31 PROCEDURE — 160035 HCHG PACU - 1ST 60 MINS PHASE I

## 2024-07-31 PROCEDURE — 85610 PROTHROMBIN TIME: CPT

## 2024-07-31 PROCEDURE — A9270 NON-COVERED ITEM OR SERVICE: HCPCS

## 2024-07-31 RX ORDER — SODIUM CHLORIDE 9 MG/ML
INJECTION, SOLUTION INTRAVENOUS CONTINUOUS
Status: DISCONTINUED | OUTPATIENT
Start: 2024-07-31 | End: 2024-07-31 | Stop reason: HOSPADM

## 2024-07-31 RX ORDER — ASPIRIN 81 MG/1
TABLET, CHEWABLE ORAL
Status: COMPLETED
Start: 2024-07-31 | End: 2024-07-31

## 2024-07-31 RX ORDER — MIDAZOLAM HYDROCHLORIDE 1 MG/ML
INJECTION INTRAMUSCULAR; INTRAVENOUS
Status: COMPLETED
Start: 2024-07-31 | End: 2024-07-31

## 2024-07-31 RX ORDER — HEPARIN SODIUM 200 [USP'U]/100ML
INJECTION, SOLUTION INTRAVENOUS
Status: COMPLETED
Start: 2024-07-31 | End: 2024-07-31

## 2024-07-31 RX ORDER — HEPARIN SODIUM 1000 [USP'U]/ML
INJECTION, SOLUTION INTRAVENOUS; SUBCUTANEOUS
Status: COMPLETED
Start: 2024-07-31 | End: 2024-07-31

## 2024-07-31 RX ORDER — VERAPAMIL HYDROCHLORIDE 2.5 MG/ML
INJECTION, SOLUTION INTRAVENOUS
Status: COMPLETED
Start: 2024-07-31 | End: 2024-07-31

## 2024-07-31 RX ORDER — LIDOCAINE HYDROCHLORIDE 20 MG/ML
INJECTION, SOLUTION INFILTRATION; PERINEURAL
Status: COMPLETED
Start: 2024-07-31 | End: 2024-07-31

## 2024-07-31 RX ADMIN — HEPARIN SODIUM 2000 UNITS: 200 INJECTION, SOLUTION INTRAVENOUS at 10:42

## 2024-07-31 RX ADMIN — LIDOCAINE HYDROCHLORIDE: 20 INJECTION, SOLUTION INFILTRATION; PERINEURAL at 10:41

## 2024-07-31 RX ADMIN — HEPARIN SODIUM: 1000 INJECTION, SOLUTION INTRAVENOUS; SUBCUTANEOUS at 10:41

## 2024-07-31 RX ADMIN — NITROGLYCERIN 10 ML: 20 INJECTION INTRAVENOUS at 10:42

## 2024-07-31 RX ADMIN — IOHEXOL 40 ML: 350 INJECTION, SOLUTION INTRAVENOUS at 11:12

## 2024-07-31 RX ADMIN — VERAPAMIL HYDROCHLORIDE 2.5 MG: 2.5 INJECTION, SOLUTION INTRAVENOUS at 10:41

## 2024-07-31 RX ADMIN — MIDAZOLAM HYDROCHLORIDE 1.5 MG: 1 INJECTION, SOLUTION INTRAMUSCULAR; INTRAVENOUS at 11:12

## 2024-07-31 RX ADMIN — ASPIRIN 81 MG: 81 TABLET, CHEWABLE ORAL at 10:47

## 2024-07-31 RX ADMIN — FENTANYL CITRATE 50 MCG: 50 INJECTION, SOLUTION INTRAMUSCULAR; INTRAVENOUS at 11:13

## 2024-07-31 ASSESSMENT — FIBROSIS 4 INDEX: FIB4 SCORE: 1.67

## 2024-07-31 NOTE — PROGRESS NOTES
1344 Discharge instructions given to patient and his family. All concerns and questions addressed. Pt and wife verbalized understanding.    1345 PIV removed. Tip intact. Dressing applied. No signs of bleeding.

## 2024-07-31 NOTE — LETTER
July 31, 2024    To Whom It May Concern:         This is confirmation that Deo Beard attended his scheduled appointment with Dr. Ulises Gee on 7/31/24.     Mr. Beard may return to work without limitations on August 5th, 2024.          If you have any questions please do not hesitate to call me at the phone number listed below.    Sincerely,          Asiya Calderon, MSN, YO  [facility], [facility]  [address] [address]  Phone: [phone]  Fax: [phone]

## 2024-07-31 NOTE — DISCHARGE INSTRUCTIONS
What to Expect Post Sedation    Rest and take it easy for the first 24 hours.  A responsible adult is recommended to remain with you during that time.  It is normal to feel sleepy.  We encourage you to not do anything that requires balance, judgment or coordination.    FOR 24 HOURS DO NOT:  Drive, operate machinery or run household appliances.  Drink beer or alcoholic beverages.  Make important decisions or sign legal documents.    MILD FLU-LIKE SYMPTOMS ARE NORMAL. You may experience generalized muscle aches, throat irritation, headache, and/or some nausea.  If any questions arise, call your provider.  If your provider is not available, please feel free to call the Surgical Center at [phone].    Medications: Resume taking daily medication.  Take prescribed pain medication with food.  If no medication is prescribed, you may take non-aspirin pain medication if needed.  PAIN  medication can be very constipating. Take a stool softener or laxative such as senokot, pericolace, or milk of magnesia if needed.    Diet: Resume your normal diet as tolerated.  A diet low in cholesterol, fat, and sodium is recommended for good health. To avoid nausea, slowly advance diet as tolerated, avoiding spicy or greasy foods for the first day.  Add more substantial food to your diet according to your provider's instructions.     Dressing: Keep dressing and incision dry and intact for 24 hours, may remove dressing and shower after 12 pm on 8/1/24, do not need to replace.  Do not submerge site in water for 7 days.     BOWEL FUNCTION:  Prescription pain medication may cause constipation. If you are having problems, use what you normally would or call your provider for suggestions. It also helps to stay regular by including fiber in your diet (for example: bran or fruits and vegetables) and drink plenty of liquids (water, juice, etc.).    Activity: No strenuous activity for 1 week.  You may tire easily; rest as needed during the  day.    10 Pound Weight Restriction Post Surgery. Do not lift anything heavier than a gallon of milk. Routine activities such as walking and using the stairs are safe. You may sleep in any position that is comfortable. Avoid strenuous activities and exercise that involves twisting, bending, and running.    Discharge Instructions:  POST ANGIOGRAM  General Care Instructions  Maintain a bandage over the incision site for 24 hours.  It's normal to find a small bruise or dime-sized lump at the insertion site. This should disappear within a few weeks.  Do not apply lotions or powders to the site.  Do not immerse the catheter insertion site in water (bathtub/swimming) for five days. It is ok to shower 24 hours after the procedure.  You may resume your normal diet immediately; on the day of your procedure, drink 6-10 glasses of water to help flush the contrast liquid out of your system.  If the doctor inserted the catheter in your arm:  For 3 days, DO NOT lift anything heavier than 10 pounds (approximately a gallon of milk). DO NOT do any heavy pushing, pulling, or twisting.    Medications  If your current medications need to be changed, you will be provided with an updated list of your medications prior to discharge.  If you take warfarin (Coumadin), resume taking your usual dose the evening after the procedure.  DO NOT STOP taking prescribed blood thinning (anti-platelet) medications unless instructed by your cardiologist.  These medications include:  Aspirin, Clopidogrel (Plavix), Ticagrelor (Brilinta), or Prasugrel (Effient)   If you take one of the following anticoagulants, RESUME 24 HOURS after your procedure:  Apixiban (Eliquis), Rivaroxaban (Xarelto), Dabigatran (Pradaxa), Edoxaban (Savaysa)  If you take metformin (Glucophage), RESUME 48 HOURS after your procedure.    When to call your healthcare provider  Call your cardiologist right away at [phone] if you have any of the following:   Problems/Concerns taking  any of your prescribed heart medicines.   The insertion site has increasing pain, swelling, redness, bleeding, or drainage.   Your arm or leg below where the insertion site changes color, is cool, or is numb.   You have chest pain or shortness of breath that does not go away with rest.   Your pulse feels irregular -- very slow (less than 60 beats/minute) or very fast (over 100 beats/minute).   You have dizziness, fainting, or you are very tired.   You are coughing up blood or yellow or green mucus.   You have chills or a fever over 101°F (38.3°C).    If there is bleeding at the catheter insertion site, apply pressure for 10 minutes.  If bleeding persists, call 911, and continue to hold pressure until advanced medical support arrives.

## 2024-07-31 NOTE — OR NURSING
1124 - Patient arrival to PPU after left heart cath. Pt awake and alert. Assessed site with cath lab RN. TR band to right wrist is clean, dry, intact. Site is soft. VSS. Denies pain at this time. Denies numbness or tingling to right hand. Educated patient on wrist precautions    1133 - belongings returned to patient at bedside  1130 - Tolerating oral intake.  1135 - updated patient's mom Gerhard  1150 - family at bedside  1225 - 3 ml air removed from TR band, oozing present, 2 ml air replaced. No oozing, bleeding, or hematoma  1245 - 3 ml air removed from TR band, no bleeding or oozing and site is soft.  1300 - 3 ml air removed from TR band, no bleeding or oozing and site is soft.  1303-provided pt with IS  1345 - TR band replaced with guaze and tegaderm  1402 - work note from Asiya given to patient.  1404 - Pt's VSS; denies N/V; patient up to edge of bed, denies discomfort. Dressing CDI to right wrist. D/c orders received. All lines and monitors discontinued. IV dc'd. Patient states ready to d/c home. No prescriptions given. Pt taken down via wheelchair with RN, in stable condition to meet bishop Hennessy with all belongings present.

## 2024-07-31 NOTE — PROCEDURES
Cardiac Catheterization report    7/31/2024  11:40 AM    Referring MD: Dr. 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  HEMODYNAMICS   Ao: 107/69 mmHg   LVEDP: 18 mmHg   Gradient on LV pullback: No    II. CORONARY ANGIOGRAPHY:  Left main coronary artery: Large-caliber vessel which becomes extremely aneurysmal at the confluence of the OM/ramus, circumflex, LAD with no obstructive lesions.  Left anterior descending artery: Large caliber ectatic vessel that is largely aneurysmal in its proximal to midportion.  Continues to supply a large positively remodeled vessels with heavy nonobstructive atherosclerotic burden.  Left circumflex coronary artery: Moderate to large caliber vessel arising from the aneurysmal segment of the distal left main without obstructive coronary disease.  There is a high obtuse marginal/ramus which is small to moderate and also free from angiographically high-grade disease.  Right coronary artery: Moderate caliber vessel with diffuse nonobstructive atherosclerosis and a heavily positively remodeled vessel.        Procedure details:  The risks and benefits of cardiac catheterization and possible intervention were explained to the patient including death, heart attack, stroke, and emergency surgery.  The patient verbalized understanding and wished to proceed.  The patient was brought to the cardiac catheterization laboratory in the fasting state and prepped and draped in the usual sterile fashion.  The right wrist was  locally anesthetized with lidocaine and the right radial artery was cannulated with 5/6-Cypriot equipment and standard radial cocktail was given.  Coronary angiography was performed using standard  diagnostic catheters in the usual fashion and used to cross the aortic valve to perform  left heart catheterization. All catheters and guidewires were removed.  A TR-Band was placed without difficulty to achieve patent hemostasis.  Patient tolerated procedure well left the Cath Lab in stable condition.    Complications:  None apparent    Sedation time:  Moderate sedation directly monitored by me during the case while supervising the administration of the sedation medication by an independent trained RN to assist in the monitoring of the patient's level of consciousness and physiological status. I, the supervising physician was present the entire time from beginning of medication administration until the end of the procedure from 10:55 AM until 11:12 AM. For detailed administration records please see the moderate sedation documentation in the media tab.    Following the procedure I discussed the results with the patient.    Ulises Gee MD, FACC, [facility]

## 2024-08-05 DIAGNOSIS — E11.9 TYPE 2 DIABETES MELLITUS WITHOUT COMPLICATION, WITHOUT LONG-TERM CURRENT USE OF INSULIN (HCC): ICD-10-CM

## 2024-08-06 RX ORDER — SEMAGLUTIDE 2.68 MG/ML
2 INJECTION, SOLUTION SUBCUTANEOUS
Qty: 9 ML | Refills: 1 | Status: SHIPPED | OUTPATIENT
Start: 2024-08-06

## 2024-08-07 PROCEDURE — RXMED WILLOW AMBULATORY MEDICATION CHARGE: Performed by: NURSE PRACTITIONER

## 2024-08-07 PROCEDURE — RXMED WILLOW AMBULATORY MEDICATION CHARGE: Performed by: PHYSICIAN ASSISTANT

## 2024-08-08 ENCOUNTER — PHARMACY VISIT (OUTPATIENT)
Dept: PHARMACY | Facility: MEDICAL CENTER | Age: 59
End: 2024-08-08
Payer: COMMERCIAL

## 2024-09-09 DIAGNOSIS — I50.20 ACC/AHA STAGE C SYSTOLIC HEART FAILURE (HCC): ICD-10-CM

## 2024-09-09 RX ORDER — DAPAGLIFLOZIN 10 MG/1
10 TABLET, FILM COATED ORAL DAILY
Qty: 90 TABLET | Refills: 2 | Status: SHIPPED | OUTPATIENT
Start: 2024-09-09

## 2024-09-10 PROCEDURE — RXMED WILLOW AMBULATORY MEDICATION CHARGE: Performed by: NURSE PRACTITIONER

## 2024-09-11 PROCEDURE — RXMED WILLOW AMBULATORY MEDICATION CHARGE: Performed by: NURSE PRACTITIONER

## 2024-09-16 ENCOUNTER — PHARMACY VISIT (OUTPATIENT)
Dept: PHARMACY | Facility: MEDICAL CENTER | Age: 59
End: 2024-09-16
Payer: COMMERCIAL

## 2024-09-19 ENCOUNTER — OFFICE VISIT (OUTPATIENT)
Dept: CARDIOLOGY | Facility: MEDICAL CENTER | Age: 59
End: 2024-09-19
Attending: NURSE PRACTITIONER
Payer: COMMERCIAL

## 2024-09-19 VITALS
HEIGHT: 68 IN | BODY MASS INDEX: 47.74 KG/M2 | OXYGEN SATURATION: 92 % | HEART RATE: 76 BPM | WEIGHT: 315 LBS | SYSTOLIC BLOOD PRESSURE: 110 MMHG | DIASTOLIC BLOOD PRESSURE: 70 MMHG

## 2024-09-19 DIAGNOSIS — I10 HTN (HYPERTENSION), MALIGNANT: ICD-10-CM

## 2024-09-19 DIAGNOSIS — I50.9 HEART FAILURE, NYHA CLASS 2 (HCC): ICD-10-CM

## 2024-09-19 DIAGNOSIS — E66.01 MORBID OBESITY WITH BMI OF 50.0-59.9, ADULT (HCC): ICD-10-CM

## 2024-09-19 DIAGNOSIS — I50.20 ACC/AHA STAGE C SYSTOLIC HEART FAILURE (HCC): ICD-10-CM

## 2024-09-19 DIAGNOSIS — Z79.899 HIGH RISK MEDICATION USE: ICD-10-CM

## 2024-09-19 DIAGNOSIS — I25.10 CORONARY ARTERY DISEASE DUE TO LIPID RICH PLAQUE: ICD-10-CM

## 2024-09-19 DIAGNOSIS — I25.83 CORONARY ARTERY DISEASE DUE TO LIPID RICH PLAQUE: ICD-10-CM

## 2024-09-19 DIAGNOSIS — E78.5 DYSLIPIDEMIA: ICD-10-CM

## 2024-09-19 PROCEDURE — 3078F DIAST BP <80 MM HG: CPT | Performed by: NURSE PRACTITIONER

## 2024-09-19 PROCEDURE — 3074F SYST BP LT 130 MM HG: CPT | Performed by: NURSE PRACTITIONER

## 2024-09-19 PROCEDURE — 99214 OFFICE O/P EST MOD 30 MIN: CPT | Performed by: NURSE PRACTITIONER

## 2024-09-19 PROCEDURE — 99213 OFFICE O/P EST LOW 20 MIN: CPT | Performed by: NURSE PRACTITIONER

## 2024-09-19 ASSESSMENT — ENCOUNTER SYMPTOMS
PALPITATIONS: 0
MYALGIAS: 0
SHORTNESS OF BREATH: 1
PND: 0
COUGH: 0
ORTHOPNEA: 0
NERVOUS/ANXIOUS: 1
ABDOMINAL PAIN: 0
FEVER: 0
CLAUDICATION: 0
DIZZINESS: 0

## 2024-09-19 ASSESSMENT — FIBROSIS 4 INDEX: FIB4 SCORE: 1.455213750217997841

## 2024-09-19 NOTE — PROGRESS NOTES
Chief Complaint   Patient presents with    Congestive Heart Failure     F/V Dx: CHF (NYHA class II, ACC/AHA stage C) (HCC)    Coronary Artery Disease     F/V Dx: Coronary artery disease due to lipid rich plaque       Subjective     Deo Beard is a 58 y.o. male who presents today for follow-up on his heart failure, hypertension,  CAD.    Patient of Dr. Garcia.  Patient was last seen in clinic on 7/8/2024 with Dr. Garcia.  During that visit, he was sent for a cath and then recommended to increase Rosuvastatin to 20 mg daily.  He reports no problems with the medication change.    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     He has been doing well, but does continue to have some shortness of breath.  He otherwise denies chest pain, palpitations, orthopnea, PND, edema or dizziness/lightheadedness.    He is not weighing himself at home.    Additionally, patient has the following medical problems:     -Previous heart failure    -Presumed CAD on prior stress test    -Diabetes    -Dyslipidemia    -MARY, not on BiPAP    -Prior polysubstance abuse    Past Medical History:   Diagnosis Date    Abnormal EKG 12/27/2012    Abnormal EKG 12/27/2012    Bronchitis 07/18/2024    history of    CAD (coronary artery disease) 2008    Cataract 07/18/2024    cataracts at present both eyes, no surgery as yet    Dental disorder 07/18/2024    upper and lower dentures    Depression 07/18/2024    medicated    Diabetes (HCC) 07/18/2024    medicated    High cholesterol 07/18/2024    medicated    HTN (hypertension) 07/18/2024    medicated    Hyperlipidemia     Hypoxia 12/27/2012    Ischemic cardiomyopathy     Leukocytosis (leucocytosis) 12/27/2012    Medically noncompliant 07/18/2013    MI (myocardial infarction) (HCC) 07/18/2024    Silent MI , ( old), noted on EKG in 2008, no residual issues    Migraine     Pain 07/18/2024    back    Personal  "history of venous thrombosis and embolism 2024    left leg \"years ago\"    Pneumonia 2024    5 months ago    Polysubstance abuse (HCC) 2013    [place] spotted fever     2017    Sleep apnea, obstructive 2024    CPAP , doesn't use at present, lost 100 lbs    Snoring 2024    at times    Substance abuse (HCC)     ; Meth    Tobacco abuse 2013     Past Surgical History:   Procedure Laterality Date    GASTRIC BANDING LAPAROSCOPIC      gastric sleeve and switch    PB INCIS/DRAIN FOREARM DEEP ABSCESS      left AC due to IV drug abuse    HYDROCELECTOMY ADULT      ORCHIECTOMY Right     ORIF, WRIST      left wrist     Family History   Problem Relation Age of Onset    Cancer Mother         intestine    Heart Disease Father         CHF    Stroke Father     Heart Attack Father     Cancer Brother         Colon Cancer    Arthritis Maternal Grandmother     Arthritis Maternal Grandfather     Arthritis Paternal Grandmother     Cancer Paternal Grandfather     Heart Disease Paternal Grandfather      Social History     Socioeconomic History    Marital status: Single     Spouse name: Not on file    Number of children: Not on file    Years of education: Not on file    Highest education level: Not on file   Occupational History    Not on file   Tobacco Use    Smoking status: Former     Current packs/day: 0.00     Average packs/day: 2.5 packs/day for 20.0 years (50.0 ttl pk-yrs)     Types: Cigarettes     Start date: 1992     Quit date: 2012     Years since quittin.9    Smokeless tobacco: Never   Vaping Use    Vaping status: Never Used   Substance and Sexual Activity    Alcohol use: Not Currently     Comment: every 2 months    Drug use: Not Currently     Types: Intravenous     Comment: history of meth times one    Sexual activity: Never     Partners: Female   Other Topics Concern    Not on file   Social History Narrative    Not on file     Social Determinants of " Health     Financial Resource Strain: Not on file   Food Insecurity: Not on file   Transportation Needs: Not on file   Physical Activity: Not on file   Stress: Not on file   Social Connections: Not on file   Intimate Partner Violence: Low Risk  (8/21/2023)    [facility]    History of Abuse     History of Abuse: Denies   Housing Stability: Not on file     No Known Allergies  Outpatient Encounter Medications as of 9/19/2024   Medication Sig Dispense Refill    dapagliflozin propanediol (FARXIGA) 10 MG Tab Take 1 Tablet by mouth every day. 90 Tablet 2    Semaglutide, 2 MG/DOSE, (OZEMPIC, 2 MG/DOSE,) 8 MG/3ML Solution Pen-injector Inject 2 mg under the skin every 7 days. 9 mL 1    rosuvastatin (CRESTOR) 20 MG Tab Take 1 Tablet by mouth every evening. 90 Tablet 3    carvedilol (COREG) 25 MG Tab Take 1 Tablet by mouth 2 times a day with meals. 180 Tablet 1    Blood Glucose Monitoring Suppl (BLOOD GLUCOSE MONITOR SYSTEM) w/Device Kit Test blood sugar as recommended by provider. 1 Kit 0    glucose blood strip Use one strip to test blood sugar once daily early morning before first meal. 100 Strip 3    Lancets Use one lancet to test blood sugar once daily early morning before first meal. 100 Each 3    Alcohol Swabs Wipe site with prep pad prior to injection. 100 Each 3    diclofenac sodium (VOLTAREN) 1 % Gel APPLY 2 GRAMS TOPICALLY 4 TIMES DAILY AS NEEDED FOR PAIN 200 g 0    Cholecalciferol (VITAMIN D) 125 MCG (5000 UT) Cap Take 1 Capsule by mouth every day. 90 Capsule 2    potassium chloride SA (KDUR) 20 MEQ Tab CR Take 1 Tablet by mouth every day. 90 Tablet 2    furosemide (LASIX) 20 MG Tab TAKE 1 TABLET BY MOUTH ONCE A DAY ONLY IF NEEDED FOR LEG SWELLING. 90 Tablet 2    DULoxetine (CYMBALTA) 30 MG Cap DR Particles Take 1 cap(s) orally every day. 30 Capsule 0    spironolactone (ALDACTONE) 25 MG Tab Take 1 Tablet by mouth every day. 90 Tablet 3    aspirin 81 MG EC tablet  "Take 81 mg by mouth every day. CHECK WITH PRESCRIBING PHYSICIAN FOR INSTRUCTIONS FOR PROCEDURE 7/31/24      NON SPECIFIED Knee brace 1 Each 0    [DISCONTINUED] carvedilol (COREG) 12.5 MG Tab Take 1 Tablet by mouth 2 times a day with meals. 180 Tablet 3     No facility-administered encounter medications on file as of 9/19/2024.     Review of Systems   Constitutional:  Negative for fever and malaise/fatigue.   Respiratory:  Positive for shortness of breath. Negative for cough.    Cardiovascular:  Negative for chest pain, palpitations, orthopnea, claudication, leg swelling and PND.   Gastrointestinal:  Negative for abdominal pain.   Musculoskeletal:  Negative for myalgias.   Neurological:  Negative for dizziness.   Psychiatric/Behavioral:  The patient is nervous/anxious.    All other systems reviewed and are negative.             Objective     /70 (BP Location: Left arm, Patient Position: Sitting, BP Cuff Size: Adult)   Pulse 76   Ht 1.727 m (5' 7.99\")   Wt (!) 153 kg (337 lb 9.6 oz)   SpO2 92%   BMI 51.34 kg/m²     Physical Exam  Vitals reviewed.   Constitutional:       Appearance: He is well-developed. He is morbidly obese.   HENT:      Head: Normocephalic and atraumatic.   Eyes:      Pupils: Pupils are equal, round, and reactive to light.   Neck:      Vascular: No JVD.   Cardiovascular:      Rate and Rhythm: Normal rate and regular rhythm.      Heart sounds: Normal heart sounds.   Pulmonary:      Effort: Pulmonary effort is normal. No respiratory distress.      Breath sounds: Normal breath sounds. No wheezing or rales.   Abdominal:      General: Bowel sounds are normal.      Palpations: Abdomen is soft.   Musculoskeletal:         General: Normal range of motion.      Cervical back: Normal range of motion and neck supple.      Right lower leg: Edema (Trace) present.      Left lower leg: Edema (Trace) present.   Skin:     General: Skin is warm and dry.   Neurological:      General: No focal deficit present. "      Mental Status: He is alert and oriented to person, place, and time.   Psychiatric:         Behavior: Behavior normal.       Lab Results   Component Value Date/Time    CHOLSTRLTOT 120 06/08/2024 07:43 AM    LDL 64 06/08/2024 07:43 AM    HDL 32 (A) 06/08/2024 07:43 AM    TRIGLYCERIDE 119 06/08/2024 07:43 AM       Lab Results   Component Value Date/Time    SODIUM 138 07/31/2024 09:43 AM    POTASSIUM 3.9 07/31/2024 09:43 AM    CHLORIDE 103 07/31/2024 09:43 AM    CO2 25 07/31/2024 09:43 AM    GLUCOSE 87 07/31/2024 09:43 AM    BUN 16 07/31/2024 09:43 AM    CREATININE 0.60 07/31/2024 09:43 AM    GLOMRATE 113 08/21/2023 01:56 PM     Lab Results   Component Value Date/Time    ALKPHOSPHAT 73 07/31/2024 09:43 AM    ASTSGOT 20 07/31/2024 09:43 AM    ALTSGPT 17 07/31/2024 09:43 AM    TBILIRUBIN 0.5 07/31/2024 09:43 AM      Echocardiogram 12/30/2012  Mild concentric left ventricular hypertrophy. Normal left ventricular   systolic function. Est EF 60%.   Interatrial septum bowed toward the left, consistent with elevated   right atrial pressures. Minimal appearance of bubbles. Valsalva suboptimal.   Structurally normal tricuspid valve. Mild tricuspid regurgitation.     RVSp 40-45 mmHg.      Transthoracic Echo Report 6/19/2023  Technically difficult study.  Normal left ventricular systolic function. The left ventricular   ejection fraction is visually estimated to be 65%.  The right ventricle is not well visualized.  The valves were suboptimally visualized.  No recent prior study for comparison.        E patch event monitor 11/21/2023-12/5/2023  Summary:     Short runs of supraventricular tachycardia that do not meet criteria for diagnosis of atrial flutter or atrial fibrillation.   Rare premature atrial complexes (<1%) and premature ventricular complexes (<1%).   No malignant arrhythmias identified.   No sinus pause.   No significant AV block.     Cardiac Catheterization report 7/31/2024  Referring MD: Dr. 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  HEMODYNAMICS              Ao: 107/69 mmHg              LVEDP: 18 mmHg              Gradient on LV pullback: No     II. CORONARY ANGIOGRAPHY:  Left main coronary artery: Large-caliber vessel which becomes extremely aneurysmal at the confluence of the OM/ramus, circumflex, LAD with no obstructive lesions.  Left anterior descending artery: Large caliber ectatic vessel that is largely aneurysmal in its proximal to midportion.  Continues to supply a large positively remodeled vessels with heavy nonobstructive atherosclerotic burden.  Left circumflex coronary artery: Moderate to large caliber vessel arising from the aneurysmal segment of the distal left main without obstructive coronary disease.  There is a high obtuse marginal/ramus which is small to moderate and also free from angiographically high-grade disease.  Right coronary artery: Moderate caliber vessel with diffuse nonobstructive atherosclerosis and a heavily positively remodeled vessel.          Assessment & Plan     1. ACC/AHA stage C systolic heart failure (HCC)  Comp Metabolic Panel    Lipid Profile      2. High risk medication use  Comp Metabolic Panel    Lipid Profile      3. Coronary artery disease due to lipid rich plaque  Comp Metabolic Panel    Lipid Profile      4. Heart failure, NYHA class 2 (HCC)        5. HTN (hypertension), malignant        6. Dyslipidemia        7. Morbid obesity with BMI of 50.0-59.9, adult (HCC)               Medical Decision Making: Today's Assessment/Status/Plan:          HFpEF, stage C, NYHA class 1-2, LVEF 65%  -Echo from [facility] showed an EF of 45 to 50% from 7/4/2022, recent echo 6/19/2023 showed improvement to 65%  -Consider repeating echo next year  -Patient fairly euvolemic except some trace lower extremity edema  -Continue furosemide 20 mg daily as needed, he mentions he has been taking every other day, recommend perhaps trying to go to daily for 1 week for his slight swelling  -Continue potassium 20 mEq daily  -Continue spironolactone 25 mg daily  -Continue Farxiga 10 mg daily, patient mentions some potential early UTI symptoms, but he will discuss with PCP, if he continues to have UTIs in the future, will consider discontinuing medication    Hypertension  Stable  -Continue carvedilol 25 mg twice a day  -Continue furosemide, spironolactone     Dyslipidemia  -Recent LDL 64 on 6/8/2024  -Continue rosuvastatin 20 mg daily     CAD  -Angiogram showed Diffuse coronary ectasia with aneurysmal formation of the proximal vessels  -Continue rosuvastatin 20 mg daily  -Continue aspirin 81 mg daily  -CMP and lipid panel in 6 months    Morbid obesity:  -Currently taking Ozempic    FU in clinic in 6 months. Sooner if needed.    Patient verbalizes understanding and agrees with the plan of care.     PLEASE NOTE: This Note was created using voice recognition Software. I have made every reasonable attempt to correct obvious errors, but I expect that there are errors of grammar and possibly content that I did not discover before finalizing the note

## 2024-10-01 PROCEDURE — RXMED WILLOW AMBULATORY MEDICATION CHARGE: Performed by: STUDENT IN AN ORGANIZED HEALTH CARE EDUCATION/TRAINING PROGRAM

## 2024-10-02 ENCOUNTER — PHARMACY VISIT (OUTPATIENT)
Dept: PHARMACY | Facility: MEDICAL CENTER | Age: 59
End: 2024-10-02
Payer: COMMERCIAL

## 2024-10-31 DIAGNOSIS — I50.9 CONGESTIVE HEART FAILURE, UNSPECIFIED HF CHRONICITY, UNSPECIFIED HEART FAILURE TYPE (HCC): ICD-10-CM

## 2024-10-31 DIAGNOSIS — Z86.79 HISTORY OF CHF (CONGESTIVE HEART FAILURE): ICD-10-CM

## 2024-10-31 DIAGNOSIS — M79.89 LEG SWELLING: ICD-10-CM

## 2024-10-31 DIAGNOSIS — I50.22 CHRONIC SYSTOLIC HEART FAILURE (HCC): ICD-10-CM

## 2024-10-31 DIAGNOSIS — I25.5 ISCHEMIC CARDIOMYOPATHY: ICD-10-CM

## 2024-10-31 DIAGNOSIS — M79.89 SWELLING OF LOWER LEG: ICD-10-CM

## 2024-10-31 DIAGNOSIS — E55.9 VITAMIN D DEFICIENCY: ICD-10-CM

## 2024-10-31 DIAGNOSIS — I10 HTN (HYPERTENSION), MALIGNANT: ICD-10-CM

## 2024-10-31 RX ORDER — CARVEDILOL 25 MG/1
25 TABLET ORAL 2 TIMES DAILY WITH MEALS
Qty: 180 TABLET | Refills: 3 | Status: SHIPPED | OUTPATIENT
Start: 2024-10-31

## 2024-11-01 NOTE — TELEPHONE ENCOUNTER
Received request via: Patient    Was the patient seen in the last year in this department? Yes    Does the patient have an active prescription (recently filled or refills available) for medication(s) requested?  Yes    Pharmacy Name: [facility]    Does the patient have [facility] and need 100-day supply? (This applies to ALL medications) Patient does not have SCP

## 2024-11-04 ENCOUNTER — HOSPITAL ENCOUNTER (OUTPATIENT)
Facility: MEDICAL CENTER | Age: 59
End: 2024-11-04
Attending: PHYSICIAN ASSISTANT
Payer: COMMERCIAL

## 2024-11-04 ENCOUNTER — OFFICE VISIT (OUTPATIENT)
Dept: MEDICAL GROUP | Facility: CLINIC | Age: 59
End: 2024-11-04
Payer: COMMERCIAL

## 2024-11-04 VITALS
DIASTOLIC BLOOD PRESSURE: 78 MMHG | TEMPERATURE: 97.2 F | SYSTOLIC BLOOD PRESSURE: 126 MMHG | WEIGHT: 315 LBS | OXYGEN SATURATION: 94 % | HEART RATE: 71 BPM | RESPIRATION RATE: 16 BRPM | HEIGHT: 68 IN | BODY MASS INDEX: 47.74 KG/M2

## 2024-11-04 DIAGNOSIS — E66.01 CLASS 3 SEVERE OBESITY DUE TO EXCESS CALORIES WITH SERIOUS COMORBIDITY AND BODY MASS INDEX (BMI) OF 50.0 TO 59.9 IN ADULT (HCC): ICD-10-CM

## 2024-11-04 DIAGNOSIS — R30.0 DYSURIA: ICD-10-CM

## 2024-11-04 DIAGNOSIS — I25.5 ISCHEMIC CARDIOMYOPATHY: ICD-10-CM

## 2024-11-04 DIAGNOSIS — I50.9 CONGESTIVE HEART FAILURE, UNSPECIFIED HF CHRONICITY, UNSPECIFIED HEART FAILURE TYPE (HCC): ICD-10-CM

## 2024-11-04 DIAGNOSIS — I50.22 CHRONIC SYSTOLIC HEART FAILURE (HCC): ICD-10-CM

## 2024-11-04 DIAGNOSIS — E66.813 CLASS 3 SEVERE OBESITY DUE TO EXCESS CALORIES WITH SERIOUS COMORBIDITY AND BODY MASS INDEX (BMI) OF 50.0 TO 59.9 IN ADULT (HCC): ICD-10-CM

## 2024-11-04 DIAGNOSIS — Z12.5 PROSTATE CANCER SCREENING: ICD-10-CM

## 2024-11-04 DIAGNOSIS — I25.10 CORONARY ARTERY DISEASE INVOLVING NATIVE CORONARY ARTERY OF NATIVE HEART WITHOUT ANGINA PECTORIS: ICD-10-CM

## 2024-11-04 DIAGNOSIS — E11.9 TYPE 2 DIABETES MELLITUS WITHOUT COMPLICATION, WITHOUT LONG-TERM CURRENT USE OF INSULIN (HCC): ICD-10-CM

## 2024-11-04 DIAGNOSIS — R39.9 UTI SYMPTOMS: ICD-10-CM

## 2024-11-04 PROBLEM — E66.9 OBESITY (BMI 30-39.9): Status: RESOLVED | Noted: 2018-07-11 | Resolved: 2024-11-04

## 2024-11-04 LAB

## 2024-11-04 PROCEDURE — 3078F DIAST BP <80 MM HG: CPT | Performed by: PHYSICIAN ASSISTANT

## 2024-11-04 PROCEDURE — 99214 OFFICE O/P EST MOD 30 MIN: CPT | Performed by: PHYSICIAN ASSISTANT

## 2024-11-04 PROCEDURE — 3074F SYST BP LT 130 MM HG: CPT | Performed by: PHYSICIAN ASSISTANT

## 2024-11-04 PROCEDURE — 87086 URINE CULTURE/COLONY COUNT: CPT

## 2024-11-04 PROCEDURE — 81002 URINALYSIS NONAUTO W/O SCOPE: CPT | Performed by: PHYSICIAN ASSISTANT

## 2024-11-04 RX ORDER — CHOLECALCIFEROL (VITAMIN D3) 125 MCG
1 CAPSULE ORAL DAILY
Qty: 90 CAPSULE | Refills: 2 | Status: SHIPPED | OUTPATIENT
Start: 2024-11-04

## 2024-11-04 RX ORDER — FLUCONAZOLE 150 MG/1
TABLET ORAL
Qty: 2 TABLET | Refills: 0 | Status: SHIPPED | OUTPATIENT
Start: 2024-11-04

## 2024-11-04 RX ORDER — POTASSIUM CHLORIDE 1500 MG/1
20 TABLET, EXTENDED RELEASE ORAL DAILY
Qty: 90 TABLET | Refills: 2 | Status: SHIPPED | OUTPATIENT
Start: 2024-11-04

## 2024-11-04 ASSESSMENT — FIBROSIS 4 INDEX: FIB4 SCORE: 1.48

## 2024-11-05 PROCEDURE — RXMED WILLOW AMBULATORY MEDICATION CHARGE: Performed by: PHYSICIAN ASSISTANT

## 2024-11-05 PROCEDURE — RXMED WILLOW AMBULATORY MEDICATION CHARGE: Performed by: NURSE PRACTITIONER

## 2024-11-07 LAB — AMBIGUOUS DTTM AMBI4: NORMAL

## 2024-11-08 LAB
BACTERIA UR CULT: NORMAL
SIGNIFICANT IND 70042: NORMAL
SITE SITE: NORMAL
SOURCE SOURCE: NORMAL

## 2024-11-16 ENCOUNTER — PATIENT MESSAGE (OUTPATIENT)
Dept: MEDICAL GROUP | Facility: CLINIC | Age: 59
End: 2024-11-16
Payer: COMMERCIAL

## 2024-11-16 DIAGNOSIS — B37.2 YEAST DERMATITIS: ICD-10-CM

## 2024-11-18 ENCOUNTER — PHARMACY VISIT (OUTPATIENT)
Dept: PHARMACY | Facility: MEDICAL CENTER | Age: 59
End: 2024-11-18
Payer: COMMERCIAL

## 2024-11-18 RX ORDER — NYSTATIN 100000 U/G
1 CREAM TOPICAL 2 TIMES DAILY
Qty: 15 G | Refills: 0 | Status: SHIPPED | OUTPATIENT
Start: 2024-11-18

## 2024-11-19 ENCOUNTER — PHARMACY VISIT (OUTPATIENT)
Dept: PHARMACY | Facility: MEDICAL CENTER | Age: 59
End: 2024-11-19
Payer: COMMERCIAL

## 2024-11-19 PROCEDURE — RXMED WILLOW AMBULATORY MEDICATION CHARGE: Performed by: PHYSICIAN ASSISTANT

## 2024-12-03 PROCEDURE — RXMED WILLOW AMBULATORY MEDICATION CHARGE: Performed by: NURSE PRACTITIONER

## 2024-12-09 ENCOUNTER — PHARMACY VISIT (OUTPATIENT)
Dept: PHARMACY | Facility: MEDICAL CENTER | Age: 59
End: 2024-12-09
Payer: COMMERCIAL

## 2025-01-25 ENCOUNTER — HOSPITAL ENCOUNTER (OUTPATIENT)
Dept: LAB | Facility: MEDICAL CENTER | Age: 60
End: 2025-01-25
Attending: PHYSICIAN ASSISTANT
Payer: COMMERCIAL

## 2025-01-25 DIAGNOSIS — I25.5 ISCHEMIC CARDIOMYOPATHY: ICD-10-CM

## 2025-01-25 DIAGNOSIS — E11.9 TYPE 2 DIABETES MELLITUS WITHOUT COMPLICATION, WITHOUT LONG-TERM CURRENT USE OF INSULIN (HCC): ICD-10-CM

## 2025-01-25 DIAGNOSIS — E78.5 DYSLIPIDEMIA: ICD-10-CM

## 2025-01-25 DIAGNOSIS — Z12.5 PROSTATE CANCER SCREENING: ICD-10-CM

## 2025-01-25 DIAGNOSIS — I50.9 CONGESTIVE HEART FAILURE, UNSPECIFIED HF CHRONICITY, UNSPECIFIED HEART FAILURE TYPE (HCC): ICD-10-CM

## 2025-01-25 LAB
ALBUMIN SERPL BCP-MCNC: 4.1 G/DL (ref 3.2–4.9)
ALBUMIN SERPL BCP-MCNC: 4.3 G/DL (ref 3.2–4.9)
ALBUMIN/GLOB SERPL: 1 G/DL
ALBUMIN/GLOB SERPL: 1.1 G/DL
ALP SERPL-CCNC: 85 U/L (ref 30–99)
ALP SERPL-CCNC: 86 U/L (ref 30–99)
ALT SERPL-CCNC: 19 U/L (ref 2–50)
ALT SERPL-CCNC: 20 U/L (ref 2–50)
ANION GAP SERPL CALC-SCNC: 11 MMOL/L (ref 7–16)
ANION GAP SERPL CALC-SCNC: 12 MMOL/L (ref 7–16)
AST SERPL-CCNC: 23 U/L (ref 12–45)
AST SERPL-CCNC: 25 U/L (ref 12–45)
BILIRUB SERPL-MCNC: 0.7 MG/DL (ref 0.1–1.5)
BILIRUB SERPL-MCNC: 0.7 MG/DL (ref 0.1–1.5)
BUN SERPL-MCNC: 19 MG/DL (ref 8–22)
BUN SERPL-MCNC: 19 MG/DL (ref 8–22)
CALCIUM ALBUM COR SERPL-MCNC: 8.8 MG/DL (ref 8.5–10.5)
CALCIUM ALBUM COR SERPL-MCNC: 8.9 MG/DL (ref 8.5–10.5)
CALCIUM SERPL-MCNC: 9 MG/DL (ref 8.5–10.5)
CALCIUM SERPL-MCNC: 9 MG/DL (ref 8.5–10.5)
CHLORIDE SERPL-SCNC: 99 MMOL/L (ref 96–112)
CHLORIDE SERPL-SCNC: 99 MMOL/L (ref 96–112)
CHOLEST SERPL-MCNC: 108 MG/DL (ref 100–199)
CHOLEST SERPL-MCNC: 109 MG/DL (ref 100–199)
CO2 SERPL-SCNC: 25 MMOL/L (ref 20–33)
CO2 SERPL-SCNC: 26 MMOL/L (ref 20–33)
CREAT SERPL-MCNC: 0.73 MG/DL (ref 0.5–1.4)
CREAT SERPL-MCNC: 0.77 MG/DL (ref 0.5–1.4)
EST. AVERAGE GLUCOSE BLD GHB EST-MCNC: 123 MG/DL
EST. AVERAGE GLUCOSE BLD GHB EST-MCNC: 126 MG/DL
GFR SERPLBLD CREATININE-BSD FMLA CKD-EPI: 104 ML/MIN/1.73 M 2
GFR SERPLBLD CREATININE-BSD FMLA CKD-EPI: 105 ML/MIN/1.73 M 2
GLOBULIN SER CALC-MCNC: 4 G/DL (ref 1.9–3.5)
GLOBULIN SER CALC-MCNC: 4.3 G/DL (ref 1.9–3.5)
GLUCOSE SERPL-MCNC: 85 MG/DL (ref 65–99)
GLUCOSE SERPL-MCNC: 86 MG/DL (ref 65–99)
HBA1C MFR BLD: 5.9 % (ref 4–5.6)
HBA1C MFR BLD: 6 % (ref 4–5.6)
HDLC SERPL-MCNC: 42 MG/DL
HDLC SERPL-MCNC: 44 MG/DL
LDLC SERPL CALC-MCNC: 46 MG/DL
LDLC SERPL CALC-MCNC: 49 MG/DL
POTASSIUM SERPL-SCNC: 4.8 MMOL/L (ref 3.6–5.5)
POTASSIUM SERPL-SCNC: 4.8 MMOL/L (ref 3.6–5.5)
PROT SERPL-MCNC: 8.3 G/DL (ref 6–8.2)
PROT SERPL-MCNC: 8.4 G/DL (ref 6–8.2)
PSA SERPL DL<=0.01 NG/ML-MCNC: 1.41 NG/ML (ref 0–4)
SODIUM SERPL-SCNC: 136 MMOL/L (ref 135–145)
SODIUM SERPL-SCNC: 136 MMOL/L (ref 135–145)
TRIGL SERPL-MCNC: 90 MG/DL (ref 0–149)
TRIGL SERPL-MCNC: 92 MG/DL (ref 0–149)

## 2025-01-25 PROCEDURE — 83036 HEMOGLOBIN GLYCOSYLATED A1C: CPT | Mod: 91

## 2025-01-25 PROCEDURE — 80053 COMPREHEN METABOLIC PANEL: CPT | Mod: 91

## 2025-01-25 PROCEDURE — 80053 COMPREHEN METABOLIC PANEL: CPT

## 2025-01-25 PROCEDURE — 83036 HEMOGLOBIN GLYCOSYLATED A1C: CPT

## 2025-01-25 PROCEDURE — 84153 ASSAY OF PSA TOTAL: CPT

## 2025-01-25 PROCEDURE — 80061 LIPID PANEL: CPT | Mod: 91

## 2025-01-25 PROCEDURE — 80061 LIPID PANEL: CPT

## 2025-01-25 PROCEDURE — 36415 COLL VENOUS BLD VENIPUNCTURE: CPT

## 2025-01-27 ENCOUNTER — APPOINTMENT (OUTPATIENT)
Dept: CARDIOLOGY | Facility: MEDICAL CENTER | Age: 60
End: 2025-01-27
Attending: NURSE PRACTITIONER
Payer: COMMERCIAL

## 2025-01-27 NOTE — PROGRESS NOTES
CHF Pharmacotherapy Visit    Informed written consent was given on: 11/21/23    Deo Beard is here for DM and CHF    HPI  Pertinent Interval History since last visit:   F/u with Dr. Garcia prior to this appt, rosuvastatin dose was increased    Potential Barriers to Care:  Adherence: denies missed doses  Side effects: none  Affordability: no issues    Diabetes Data Review:  Lab Results   Component Value Date/Time    HBA1C 5.9 (H) 01/25/2025 07:25 AM      Home Blood Glucose Readings:  Not monitoring    Current diabetes medications:  Ozempic 2 mg weekly  Farxiga 10 mg daily    DM Preventative Management:  BP regimen (ACE/ARB) - none; pt declines d/t anxiety with past use of losartan  BP < 140/90 - yes  LDL < 70 - yes  Last Retinal Scan - 05/2024  Last Microalbumin/Cr - 05/2024    Lipids Data Review:   Lab Results   Component Value Date/Time    CHOLSTRLTOT 109 01/25/2025 07:25 AM    LDL 49 01/25/2025 07:25 AM    HDL 42 01/25/2025 07:25 AM    TRIGLYCERIDE 90 01/25/2025 07:25 AM       Current lipid medications:  rosuvastatin 20 mg daily (increased by cardiology today)    Most recent EF:  65% 06/19/23  45% to 50% 07/04/22    Current CHF Medications - including dose:   Entresto or ACE/ARB: none  Beta blocker: carvedilol 25 mg BID   Diuretic: none  Aldosterone antagonist: spironolactone 25 mg daily  SGLT2i: Farxiga 10 mg daily    Home BP and HR:  Average BP: 114/62  Average HR: 76    Lifestyle:  Change in weight:  lost 15 lbs since last visit  Exercise habits: no formal exercise, does walk around at work.    Diet: stable appetite since increasing Ozempic dose  Breakfast - Sausage and egg croissant  Lunch - Bologna sandwich w/ chips  Dinner - Varies. Typically meat and vegetable  Snacks - None typically  Drinks - Pepsi x 3 per day (regular - he's unwilling to DC), water    DATA REVIEW  There were no vitals filed for this visit.    Lab Results   Component Value Date/Time    SODIUM 136 01/25/2025 07:25 AM    POTASSIUM  "4.8 01/25/2025 07:25 AM    CHLORIDE 99 01/25/2025 07:25 AM    CO2 25 01/25/2025 07:25 AM    GLUCOSE 85 01/25/2025 07:25 AM    BUN 19 01/25/2025 07:25 AM    CREATININE 0.73 01/25/2025 07:25 AM    GLOMRATE 113 08/21/2023 01:56 PM     Lab Results   Component Value Date/Time    ALKPHOSPHAT 85 01/25/2025 07:25 AM    ASTSGOT 25 01/25/2025 07:25 AM    ALTSGPT 20 01/25/2025 07:25 AM    TBILIRUBIN 0.7 01/25/2025 07:25 AM    INR 0.93 07/31/2024 09:43 AM    ALBUMIN 4.3 01/25/2025 07:25 AM      No components found for: \"MICROALBUMINCREATRATIOURINE\"    Renal function:  Calculated creatinine clearance: >100 ml/min   eGFR: >60 mL/min/1.73 m2    Recent Imaging Studies:    None since last visit    ASSESSMENT AND PLAN  T2DM  Goals: FBG 80 - 130, 2hPP < 180, a1c < 7.0%, A1c remains at goal  Pt continues to tolerate medications well and continues to experience appetite suppression and weight loss from Ozempic  No changes warranted at this time    Diabetes medications (changes are bolded)  Ozempic 2 mg weekly  Farxiga 10 mg daily    CHF  Pt with HFpEF and has been stable  No issues noted with current regimen    CHF medications (changes are bolded)  Entresto or ACE/ARB: none  Beta blocker: carvedilol 25 mg BID   Diuretic: none  Aldosterone antagonist: spironolactone 25 mg daily  SGLT2i: Farxiga 10 mg daily    3. Hyperlipidemia  Patient type: Secondary Prevention  Goal: LDL-C:   <70 mg/dL  Rosuvastatin dose increased by cardiology    Lipid medications (changes are bolded)  Rosuvastatin 20 mg daily    4. Lifestyle   Recommendations From Today's Visit:   Diet: Maximize lean proteins and veggies. Cut out/down on carbs. Avoid simple sugars.   Exercise: Start simple by walking daily and Increase as tolerated    Blood Work Ordered At Today's visit:   A1c, CMP, lipid panel    Follow-Up:   6 months    Fabiana Villar, [place]    CC:  Marian Chang PAngelinaAAngelina-VINOD.  No ref. provider found    xMedications reconciled  xFlow sheets updated    "

## 2025-01-30 ENCOUNTER — NON-PROVIDER VISIT (OUTPATIENT)
Dept: CARDIOLOGY | Facility: MEDICAL CENTER | Age: 60
End: 2025-01-30
Attending: NURSE PRACTITIONER
Payer: COMMERCIAL

## 2025-01-30 VITALS
WEIGHT: 315 LBS | SYSTOLIC BLOOD PRESSURE: 106 MMHG | DIASTOLIC BLOOD PRESSURE: 65 MMHG | BODY MASS INDEX: 52.46 KG/M2 | HEART RATE: 68 BPM

## 2025-01-30 DIAGNOSIS — E78.2 MIXED HYPERLIPIDEMIA: ICD-10-CM

## 2025-01-30 DIAGNOSIS — E66.813 CLASS 3 SEVERE OBESITY DUE TO EXCESS CALORIES WITH SERIOUS COMORBIDITY AND BODY MASS INDEX (BMI) OF 50.0 TO 59.9 IN ADULT (HCC): ICD-10-CM

## 2025-01-30 DIAGNOSIS — I50.9 CONGESTIVE HEART FAILURE, UNSPECIFIED HF CHRONICITY, UNSPECIFIED HEART FAILURE TYPE (HCC): ICD-10-CM

## 2025-01-30 DIAGNOSIS — E11.9 TYPE 2 DIABETES MELLITUS WITHOUT COMPLICATION, WITHOUT LONG-TERM CURRENT USE OF INSULIN (HCC): ICD-10-CM

## 2025-01-30 DIAGNOSIS — E66.01 CLASS 3 SEVERE OBESITY DUE TO EXCESS CALORIES WITH SERIOUS COMORBIDITY AND BODY MASS INDEX (BMI) OF 50.0 TO 59.9 IN ADULT (HCC): ICD-10-CM

## 2025-01-30 DIAGNOSIS — I25.2 HISTORY OF MI (MYOCARDIAL INFARCTION): ICD-10-CM

## 2025-01-30 PROCEDURE — 99212 OFFICE O/P EST SF 10 MIN: CPT | Performed by: PHARMACIST

## 2025-01-30 RX ORDER — TIRZEPATIDE 5 MG/.5ML
5 INJECTION, SOLUTION SUBCUTANEOUS
Qty: 2 ML | Refills: 1 | Status: SHIPPED | OUTPATIENT
Start: 2025-01-30

## 2025-01-30 ASSESSMENT — FIBROSIS 4 INDEX: FIB4 SCORE: 1.71

## 2025-01-30 NOTE — Clinical Note
Sung Fonseca, Just SALTY - saw Deo today and he continues to struggle w/ yeast infection. I told him I'd let you know. You have f/u scheduled w/ him on Monday, 2/3. Thank you, Cassius

## 2025-01-30 NOTE — PROGRESS NOTES
CHF Pharmacotherapy Visit    Date of Referral: 11/14/2023 - Renewed today (1/30/25)    Deo Beard is here for DM, CHF, and lipids    HPI  Pertinent Interval History since last visit:   Cuate'jayme Felipega d/t UTI back in 11/2024 d/t yeast infection - pt states he continues to struggle w/ this problem. Will notify PCP.    Potential Barriers to Care:  Adherence: denies missed doses  Side effects: none  Affordability: no issues    Diabetes Data Review:  Lab Results   Component Value Date/Time    HBA1C 5.9 (H) 01/25/2025 07:25 AM      Home Blood Glucose Readings:  Not routinely monitoring d/t controlled A1c    Current diabetes medications:  Ozempic 2 mg weekly      Lipids Data Review:   Lab Results   Component Value Date/Time    CHOLSTRLTOT 109 01/25/2025 07:25 AM    LDL 49 01/25/2025 07:25 AM    HDL 42 01/25/2025 07:25 AM    TRIGLYCERIDE 90 01/25/2025 07:25 AM       Current lipid medications:  rosuvastatin 20 mg daily     Most recent EF:  65% 06/19/23  45% to 50% 07/04/22    Current CHF Medications - including dose:   Entresto or ACE/ARB: none  Beta blocker: carvedilol 25 mg BID   Diuretic: none  Aldosterone antagonist: spironolactone 25 mg daily  SGLT2i: None - developed UTI/yeast infection w/ Farxiga    Home BP and HR: Not routinely monitoring at home.    Lifestyle:  Change in weight: Pt up ~ 13 lbs since last seen.  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   Diet: Increased appetite lately.  Breakfast - Sausage and egg croissant  Lunch - Bologna sandwich w/ chips  Dinner - Varies. Typically meat and vegetable  Snacks - None typically  Drinks - Pepsi x 3 per day (regular - he's unwilling to DC), water    DATA REVIEW  There were no vitals filed for this visit.    Lab Results   Component Value Date/Time    SODIUM 136 01/25/2025 07:25 AM    POTASSIUM 4.8 01/25/2025 07:25 AM    CHLORIDE 99 01/25/2025 07:25 AM    CO2 25 01/25/2025 07:25 AM     "GLUCOSE 85 01/25/2025 07:25 AM    BUN 19 01/25/2025 07:25 AM    CREATININE 0.73 01/25/2025 07:25 AM    GLOMRATE 113 08/21/2023 01:56 PM     Lab Results   Component Value Date/Time    ALKPHOSPHAT 85 01/25/2025 07:25 AM    ASTSGOT 25 01/25/2025 07:25 AM    ALTSGPT 20 01/25/2025 07:25 AM    TBILIRUBIN 0.7 01/25/2025 07:25 AM    INR 0.93 07/31/2024 09:43 AM    ALBUMIN 4.3 01/25/2025 07:25 AM      No components found for: \"MICROALBUMINCREATRATIOURINE\"    Renal function:  Calculated creatinine clearance: >100 ml/min   eGFR: >60 mL/min/1.73 m2    Recent Imaging Studies:    None since last visit    ASSESSMENT AND PLAN  T2DM  Goals: FBG 80 - 130, 2hPP < 180, a1c < 7.0%  Repeat A1c was 5.9% on 1/25/25 which is at goal and unchanged from previous.  No routinely monitoring his home BG d/t controlled DM.  Since last visit, pt's SGLT2i was Dc'd d/t yeast infection. Pt now on GLP1 monotherapy.  Pt reports increased appetite and he has gained weight since last visit. He is interested in transition to GIP/GLP1 agonist.    Diabetes medications (changes are bolded)  DC Ozempic  START Mounjaro 5 mg SQ Q7D  Counseled pt regarding MOA, SE, and administration    CHF  Pt with HFpEF and has been stable  Clinic vitals at goal.  No issues noted with current regimen now that Farxiga Dc'd.    CHF medications (changes are bolded)  Entresto or ACE/ARB: none  Beta blocker: carvedilol 25 mg BID   Diuretic: none  Aldosterone antagonist: spironolactone 25 mg daily  SGLT2i: None - developed UTI/yeast infection    3. Hyperlipidemia  Patient type: Secondary Prevention - hx of MI  Goal: LDL-C:   <70 mg/dL  Most recent lipid panel shows all markers at goal.    Lipid medications (changes are bolded)  Rosuvastatin 20 mg daily    4. Lifestyle   Recommendations From Today's Visit:   Diet: Maximize lean proteins and veggies. Cut out/down on carbs. Avoid simple sugars.   Exercise: Start simple by walking daily and Increase as tolerated    Blood Work Ordered At " Today's visit:   None    Follow-Up:   4 weeks for Mounjaro titration    Cassius Ventura, [place]    CC:  Marian Chang P.A.-C.

## 2025-01-31 ENCOUNTER — TELEPHONE (OUTPATIENT)
Dept: CARDIOLOGY | Facility: MEDICAL CENTER | Age: 60
End: 2025-01-31
Payer: COMMERCIAL

## 2025-01-31 NOTE — TELEPHONE ENCOUNTER
Received [employer] request via [employer]  for Tirzepatide (MOUNJARO) 5 MG/0.5ML Solution Auto-injector . (Quantity:2, Day Supply:28)     Insurance: CONWEAVER  Member ID:  [id number]  BIN: [id number]  PCN: [id number]  Group: [id number]     Ran Test claim via [place] & medication Rejects stating prior authorization is required.

## 2025-02-03 ENCOUNTER — OFFICE VISIT (OUTPATIENT)
Dept: MEDICAL GROUP | Facility: CLINIC | Age: 60
End: 2025-02-03
Payer: COMMERCIAL

## 2025-02-03 VITALS
RESPIRATION RATE: 16 BRPM | DIASTOLIC BLOOD PRESSURE: 70 MMHG | BODY MASS INDEX: 47.74 KG/M2 | WEIGHT: 315 LBS | TEMPERATURE: 98.5 F | HEART RATE: 76 BPM | SYSTOLIC BLOOD PRESSURE: 120 MMHG | HEIGHT: 68 IN | OXYGEN SATURATION: 92 %

## 2025-02-03 DIAGNOSIS — G89.29 CHRONIC PAIN OF LEFT KNEE: ICD-10-CM

## 2025-02-03 DIAGNOSIS — E11.9 TYPE 2 DIABETES MELLITUS WITHOUT COMPLICATION, WITHOUT LONG-TERM CURRENT USE OF INSULIN (HCC): ICD-10-CM

## 2025-02-03 DIAGNOSIS — E78.2 MIXED HYPERLIPIDEMIA: ICD-10-CM

## 2025-02-03 DIAGNOSIS — I25.5 ISCHEMIC CARDIOMYOPATHY: ICD-10-CM

## 2025-02-03 DIAGNOSIS — E66.01 CLASS 3 SEVERE OBESITY DUE TO EXCESS CALORIES WITH SERIOUS COMORBIDITY AND BODY MASS INDEX (BMI) OF 50.0 TO 59.9 IN ADULT (HCC): ICD-10-CM

## 2025-02-03 DIAGNOSIS — Z86.718 HISTORY OF DVT IN ADULTHOOD: ICD-10-CM

## 2025-02-03 DIAGNOSIS — M25.562 CHRONIC PAIN OF LEFT KNEE: ICD-10-CM

## 2025-02-03 DIAGNOSIS — E66.813 CLASS 3 SEVERE OBESITY DUE TO EXCESS CALORIES WITH SERIOUS COMORBIDITY AND BODY MASS INDEX (BMI) OF 50.0 TO 59.9 IN ADULT (HCC): ICD-10-CM

## 2025-02-03 DIAGNOSIS — I10 PRIMARY HYPERTENSION: ICD-10-CM

## 2025-02-03 DIAGNOSIS — B37.49 YEAST DERMATITIS OF PENIS: ICD-10-CM

## 2025-02-03 PROCEDURE — 3078F DIAST BP <80 MM HG: CPT | Performed by: PHYSICIAN ASSISTANT

## 2025-02-03 PROCEDURE — 3074F SYST BP LT 130 MM HG: CPT | Performed by: PHYSICIAN ASSISTANT

## 2025-02-03 PROCEDURE — 99214 OFFICE O/P EST MOD 30 MIN: CPT | Performed by: PHYSICIAN ASSISTANT

## 2025-02-03 ASSESSMENT — FIBROSIS 4 INDEX: FIB4 SCORE: 1.71

## 2025-02-04 NOTE — PROGRESS NOTES
cc:  diabetes    Subjective:     Deo Beard is a 59 y.o. male presenting for diabetes        History of Present Illness  The patient is a 59-year-old male who presents to the office today for a follow-up on diabetes. He did see the clinical pharmacist on 01/30/2025.    He reports persistent yeast infections, which he attributes to the use of Farxiga. The infection, located at the tip of his penis, has not resolved despite treatment with Diflucan and ketoconazole cream. However, he notes improvement with over-the-counter nystatin, which he continues to use. The infection, which previously disrupted his sleep, no longer causes significant discomfort. He has been in a monogamous relationship for the past 6 years and underwent HIV testing approximately 2 to 3 years ago.    He also reports a recurrence of knee pain, necessitating the use of a brace. He has been diagnosed with a Baker's cyst. He recalls a previous episode of a blood clot in his leg, which was not associated with pain but resulted in foot swelling for about 2 weeks. This incident occurred prior to his heart attack. He was subsequently placed on Coumadin. He finds relief from the application of a topical cream and does not require a refill at this time.    He is concerned about frequent yeast infections.    MEDICATIONS  Current: nystatin, Coumadin  Past: Farxiga, Diflucan, ketoconazole       Review of systems:  See above.   Denies any symptoms unless previously indicated.        Current Outpatient Medications:     Tirzepatide (MOUNJARO) 5 MG/0.5ML Solution Auto-injector, Inject 5 mg under the skin every 7 days., Disp: 2 mL, Rfl: 1    Cholecalciferol (VITAMIN D) 125 MCG (5000 UT) Cap, Take 1 capsule by mouth every day., Disp: 90 Capsule, Rfl: 2    potassium chloride SA (KDUR) 20 MEQ Tab CR, Take 1 tablet by mouth every day., Disp: 90 Tablet, Rfl: 2    carvedilol (COREG) 25 MG Tab, Take 1 Tablet by mouth 2 times a day with meals., Disp: 180 Tablet,  "Rfl: 3    rosuvastatin (CRESTOR) 20 MG Tab, Take 1 Tablet by mouth every evening., Disp: 90 Tablet, Rfl: 3    Blood Glucose Monitoring Suppl (BLOOD GLUCOSE MONITOR SYSTEM) w/Device Kit, Test blood sugar as recommended by provider., Disp: 1 Kit, Rfl: 0    glucose blood strip, Use one strip to test blood sugar once daily early morning before first meal., Disp: 100 Strip, Rfl: 3    Lancets, Use one lancet to test blood sugar once daily early morning before first meal., Disp: 100 Each, Rfl: 3    Alcohol Swabs, Wipe site with prep pad prior to injection., Disp: 100 Each, Rfl: 3    diclofenac sodium (VOLTAREN) 1 % Gel, APPLY 2 GRAMS TOPICALLY 4 TIMES DAILY AS NEEDED FOR PAIN, Disp: 200 g, Rfl: 0    furosemide (LASIX) 20 MG Tab, TAKE 1 TABLET BY MOUTH ONCE A DAY ONLY IF NEEDED FOR LEG SWELLING., Disp: 90 Tablet, Rfl: 2    DULoxetine (CYMBALTA) 30 MG Cap DR Particles, Take 1 cap(s) orally every day., Disp: 30 Capsule, Rfl: 0    spironolactone (ALDACTONE) 25 MG Tab, Take 1 Tablet by mouth every day., Disp: 90 Tablet, Rfl: 3    aspirin 81 MG EC tablet, Take 81 mg by mouth every day. CHECK WITH PRESCRIBING PHYSICIAN FOR INSTRUCTIONS FOR PROCEDURE 7/31/24, Disp: , Rfl:     NON SPECIFIED, Knee brace, Disp: 1 Each, Rfl: 0    nystatin (MYCOSTATIN) 868565 UNIT/GM Cream topical cream, Apply 1 g topically 2 times a day. (Patient not taking: Reported on 2/3/2025), Disp: 15 g, Rfl: 0    fluconazole (DIFLUCAN) 150 MG tablet, Take one tab weekly for 2 weeks. (Patient not taking: Reported on 2/3/2025), Disp: 2 Tablet, Rfl: 0    Allergies, past medical history, past surgical history, family history, social history reviewed and updated    Objective:     Vitals: /70   Pulse 76   Temp 36.9 °C (98.5 °F) (Temporal)   Resp 16   Ht 1.727 m (5' 8\")   Wt (!) 157 kg (345 lb 0.3 oz)   SpO2 92%   BMI 52.46 kg/m²   General: Alert, pleasant, NAD  EYES:   PERRL, EOMI, no icterus or pallor.  Conjunctivae and lids normal.   HENT:  " Normocephalic.  External ears normal.   Neck supple.     Respiratory: Normal respiratory effort.   Abdomen: Not obese  Skin: Warm, dry, no rashes.  Musculoskeletal: Gait is normal.  Moves all extremities well.    Extremities: normal range of motion all extremities.   Neurological: No tremors, sensation grossly intact,  CN2-12 intact.  Psych:  Affect/mood is normal, judgement is good, memory is intact, grooming is appropriate.    Results  Laboratory Studies  Kidney function is good. Liver function is good. PSA lab looks good. A1c is well controlled. LDL cholesterol is 49.      Latest Reference Range & Units 01/25/25 07:25   Sodium 135 - 145 mmol/L 136   Potassium 3.6 - 5.5 mmol/L 4.8   Chloride 96 - 112 mmol/L 99   Co2 20 - 33 mmol/L 25   Anion Gap 7.0 - 16.0  12.0   Glucose 65 - 99 mg/dL 85   Bun 8 - 22 mg/dL 19   Creatinine 0.50 - 1.40 mg/dL 0.73   GFR (CKD-EPI) >60 mL/min/1.73 m 2 105   Calcium 8.5 - 10.5 mg/dL 9.0   Correct Calcium 8.5 - 10.5 mg/dL 8.8   AST(SGOT) 12 - 45 U/L 25   ALT(SGPT) 2 - 50 U/L 20   Alkaline Phosphatase 30 - 99 U/L 85   Total Bilirubin 0.1 - 1.5 mg/dL 0.7   Albumin 3.2 - 4.9 g/dL 4.3   Total Protein 6.0 - 8.2 g/dL 8.3 (H)   Globulin 1.9 - 3.5 g/dL 4.0 (H)   A-G Ratio g/dL 1.1   Glycohemoglobin 4.0 - 5.6 % 5.9 (H)   Estim. Avg Glu mg/dL 123   Cholesterol,Tot 100 - 199 mg/dL 109   Triglycerides 0 - 149 mg/dL 90   HDL >=40 mg/dL 42   LDL <100 mg/dL 49   Prostatic Specific Antigen Tot 0.00 - 4.00 ng/mL 1.41   (H): Data is abnormally high    Assessment/Plan:     Deo was seen today for lab results and knee pain.    Diagnoses and all orders for this visit:    Type 2 diabetes mellitus without complication, without long-term current use of insulin ([facility])  -     Lipid Profile; Future  -     Comp Metabolic Panel; Future  -     HEMOGLOBIN A1C; Future    Class 3 severe obesity due to excess calories with serious comorbidity and body mass index (BMI) of 50.0 to 59.9 in adult (HCC)  -     Lipid Profile;  Future  -     Comp Metabolic Panel; Future    Mixed hyperlipidemia  -     Lipid Profile; Future  -     Comp Metabolic Panel; Future    Primary hypertension  -     Lipid Profile; Future  -     Comp Metabolic Panel; Future    Ischemic cardiomyopathy  -     Lipid Profile; Future  -     Comp Metabolic Panel; Future    Yeast dermatitis of penis    Chronic pain of left knee    History of DVT in adulthood  -     FACTOR V LEIDEN MUTATION; Future        Assessment & Plan  1. Type 2 diabetes/obesity/mixed hyperlipidemia/hypertension/ischemic cardiomyopathy.  His condition is currently stable, with satisfactory laboratory results. He has consulted with the clinical pharmacist, who plans to incorporate Mounjaro into his existing medication regimen. Follow-up appointments are scheduled for every 6 to 9 months, during which laboratory tests will be conducted.  Labs also ordered to be repeated in 6 to 9 months including lipid A1c and metabolic panel.    2. Yeast dermatitis of the penis.  He reports that Monistat has been effective in managing the condition. He has been advised to return for a follow-up visit for culture and potential HIV testing if the effectiveness of Monistat diminishes. He has expressed understanding of this plan.    3. Chronic pain in the left knee.  The pain is currently under control. Continued monitoring of the condition is planned.    4. History of deep vein thrombosis (DVT).  A Leiden factor V test will be conducted during the next set of laboratory tests.        Return in about 9 months (around 11/3/2025) for 6-9 months labs.    Please note that this dictation was created using voice recognition software. I have made every reasonable attempt to correct obvious errors, but expect that there are errors of grammar and possible content that I did not discover before finalizing note.

## 2025-02-27 ENCOUNTER — NON-PROVIDER VISIT (OUTPATIENT)
Dept: CARDIOLOGY | Facility: MEDICAL CENTER | Age: 60
End: 2025-02-27
Attending: NURSE PRACTITIONER
Payer: COMMERCIAL

## 2025-02-27 VITALS — WEIGHT: 315 LBS | BODY MASS INDEX: 53.22 KG/M2

## 2025-02-27 ASSESSMENT — FIBROSIS 4 INDEX: FIB4 SCORE: 1.71

## 2025-03-04 PROCEDURE — RXMED WILLOW AMBULATORY MEDICATION CHARGE: Performed by: PHYSICIAN ASSISTANT

## 2025-03-04 PROCEDURE — RXMED WILLOW AMBULATORY MEDICATION CHARGE: Performed by: NURSE PRACTITIONER

## 2025-03-04 PROCEDURE — RXMED WILLOW AMBULATORY MEDICATION CHARGE: Performed by: STUDENT IN AN ORGANIZED HEALTH CARE EDUCATION/TRAINING PROGRAM

## 2025-03-05 ENCOUNTER — PHARMACY VISIT (OUTPATIENT)
Dept: PHARMACY | Facility: MEDICAL CENTER | Age: 60
End: 2025-03-05
Payer: COMMERCIAL

## 2025-03-10 ENCOUNTER — APPOINTMENT (OUTPATIENT)
Dept: CARDIOLOGY | Facility: MEDICAL CENTER | Age: 60
End: 2025-03-10
Attending: NURSE PRACTITIONER
Payer: COMMERCIAL

## 2025-03-18 ENCOUNTER — OFFICE VISIT (OUTPATIENT)
Dept: CARDIOLOGY | Facility: MEDICAL CENTER | Age: 60
End: 2025-03-18
Attending: NURSE PRACTITIONER
Payer: COMMERCIAL

## 2025-03-18 VITALS
RESPIRATION RATE: 16 BRPM | BODY MASS INDEX: 47.74 KG/M2 | HEART RATE: 83 BPM | SYSTOLIC BLOOD PRESSURE: 106 MMHG | WEIGHT: 315 LBS | DIASTOLIC BLOOD PRESSURE: 60 MMHG | HEIGHT: 68 IN | OXYGEN SATURATION: 92 %

## 2025-03-18 DIAGNOSIS — I25.10 CORONARY ARTERY DISEASE DUE TO LIPID RICH PLAQUE: ICD-10-CM

## 2025-03-18 DIAGNOSIS — I50.32 CHRONIC HEART FAILURE WITH PRESERVED EJECTION FRACTION (HCC): ICD-10-CM

## 2025-03-18 DIAGNOSIS — I50.9 CHF (NYHA CLASS II, ACC/AHA STAGE C) (HCC): ICD-10-CM

## 2025-03-18 DIAGNOSIS — R06.09 DOE (DYSPNEA ON EXERTION): ICD-10-CM

## 2025-03-18 DIAGNOSIS — E66.813 CLASS 3 SEVERE OBESITY DUE TO EXCESS CALORIES WITH SERIOUS COMORBIDITY AND BODY MASS INDEX (BMI) OF 50.0 TO 59.9 IN ADULT (HCC): ICD-10-CM

## 2025-03-18 DIAGNOSIS — G47.33 OSA (OBSTRUCTIVE SLEEP APNEA): ICD-10-CM

## 2025-03-18 DIAGNOSIS — E66.01 CLASS 3 SEVERE OBESITY DUE TO EXCESS CALORIES WITH SERIOUS COMORBIDITY AND BODY MASS INDEX (BMI) OF 50.0 TO 59.9 IN ADULT (HCC): ICD-10-CM

## 2025-03-18 DIAGNOSIS — Z79.899 HIGH RISK MEDICATION USE: ICD-10-CM

## 2025-03-18 DIAGNOSIS — I25.83 CORONARY ARTERY DISEASE DUE TO LIPID RICH PLAQUE: ICD-10-CM

## 2025-03-18 DIAGNOSIS — I10 ESSENTIAL HYPERTENSION: ICD-10-CM

## 2025-03-18 DIAGNOSIS — E11.9 TYPE 2 DIABETES MELLITUS WITHOUT COMPLICATION, WITHOUT LONG-TERM CURRENT USE OF INSULIN (HCC): ICD-10-CM

## 2025-03-18 DIAGNOSIS — I50.20 ACC/AHA STAGE C SYSTOLIC HEART FAILURE (HCC): ICD-10-CM

## 2025-03-18 PROCEDURE — 3074F SYST BP LT 130 MM HG: CPT | Performed by: NURSE PRACTITIONER

## 2025-03-18 PROCEDURE — 99214 OFFICE O/P EST MOD 30 MIN: CPT | Performed by: NURSE PRACTITIONER

## 2025-03-18 PROCEDURE — 3078F DIAST BP <80 MM HG: CPT | Performed by: NURSE PRACTITIONER

## 2025-03-18 PROCEDURE — 99213 OFFICE O/P EST LOW 20 MIN: CPT | Performed by: NURSE PRACTITIONER

## 2025-03-18 RX ORDER — SPIRONOLACTONE 50 MG/1
50 TABLET, FILM COATED ORAL DAILY
Qty: 90 TABLET | Refills: 3 | Status: SHIPPED | OUTPATIENT
Start: 2025-03-18

## 2025-03-18 ASSESSMENT — FIBROSIS 4 INDEX: FIB4 SCORE: 1.71

## 2025-03-18 NOTE — PROGRESS NOTES
Chief Complaint   Patient presents with    Congestive Heart Failure     F/V Dx: ACC/AHA stage C systolic heart failure (HCC)       Subjective     Deo Beard is a 59 y.o. male who presents today  for follow-up on his heart failure, hypertension,  CAD.  Patient has additional medical problems of DM, dyslipidemia, MARY not on BiPAP, history of substance abuse     Patient of Dr. Garcia.  Patient was last seen in clinic on 9/19/2024 with YO Chandler.    Today, patient reports HINOJOSA approximately 50 to 100 feet.  Patient developed yeast infection with Farxiga.  Patient has been using Lasix every other day.  Otherwise, Patient denies chest pain, shortness of breath at rest, palpitations, dizziness/lightheadedness, orthopnea, PND or Edema.  Patient has been working pharmacotherapy plan for SGL 1 weight loss.  Patient reports previously on CPAP therapy, but discontinued after weight loss.  Patient has not been reevaluated since weight gain.    We will continue to optimize GDMT per below.  Will reevaluate renal electrolyte function high risk medication use in 2 weeks.  Will refer patient to sleep medicine to reestablish care.  Patient to follow-up in 6 months, sooner if needed.    Past Medical History:   Diagnosis Date    Abnormal EKG 12/27/2012    Abnormal EKG 12/27/2012    Bronchitis 07/18/2024    history of    CAD (coronary artery disease) 2008    Cataract 07/18/2024    cataracts at present both eyes, no surgery as yet    Dental disorder 07/18/2024    upper and lower dentures    Depression 07/18/2024    medicated    Diabetes (HCC) 07/18/2024    medicated    High cholesterol 07/18/2024    medicated    HTN (hypertension) 07/18/2024    medicated    Hyperlipidemia     Hypoxia 12/27/2012    Ischemic cardiomyopathy     Leukocytosis (leucocytosis) 12/27/2012    Medically noncompliant 07/18/2013    MI (myocardial infarction) (HCC) 07/18/2024    Silent MI , ( old), noted on EKG in 2008, no residual issues    Migraine   "   Obesity (BMI 30-39.9) 2018    Pain 2024    back    Personal history of venous thrombosis and embolism 2024    left leg \"years ago\"    Pneumonia 2024    5 months ago    Polysubstance abuse (HCC) 2013    Vineyard spotted fever     2017    Sleep apnea, obstructive 2024    CPAP , doesn't use at present, lost 100 lbs    Snoring 2024    at times    Substance abuse (HCC)     ; Meth    Tobacco abuse 2013     Past Surgical History:   Procedure Laterality Date    GASTRIC BANDING LAPAROSCOPIC      gastric sleeve and switch    PB INCIS/DRAIN FOREARM DEEP ABSCESS      left AC due to IV drug abuse    HYDROCELECTOMY ADULT      ORCHIECTOMY Right     ORIF, WRIST      left wrist     Family History   Problem Relation Age of Onset    Cancer Mother         intestine    Heart Disease Father         CHF    Stroke Father     Heart Attack Father     Cancer Brother         Colon Cancer    Arthritis Maternal Grandmother     Arthritis Maternal Grandfather     Arthritis Paternal Grandmother     Cancer Paternal Grandfather     Heart Disease Paternal Grandfather      Social History     Socioeconomic History    Marital status: Single     Spouse name: Not on file    Number of children: Not on file    Years of education: Not on file    Highest education level: Not on file   Occupational History    Not on file   Tobacco Use    Smoking status: Former     Current packs/day: 0.00     Average packs/day: 2.5 packs/day for 20.0 years (50.0 ttl pk-yrs)     Types: Cigarettes     Start date: 1992     Quit date: 2012     Years since quittin.4    Smokeless tobacco: Never   Vaping Use    Vaping status: Never Used   Substance and Sexual Activity    Alcohol use: Not Currently     Comment: every 2 months    Drug use: Not Currently     Types: Intravenous     Comment: history of meth times one    Sexual activity: Never     Partners: Female   Other Topics Concern    Not on " file   Social History Narrative    Not on file     Social Drivers of Health     Financial Resource Strain: Not on file   Food Insecurity: Not on file   Transportation Needs: Not on file   Physical Activity: Not on file   Stress: Not on file   Social Connections: Not on file   Intimate Partner Violence: Low Risk  (8/21/2023)    [facility]    History of Abuse     History of Abuse: Denies   Housing Stability: Not on file     No Known Allergies  Outpatient Encounter Medications as of 3/18/2025   Medication Sig Dispense Refill    spironolactone (ALDACTONE) 50 MG Tab Take 1 Tablet by mouth every day. 90 Tablet 3    Tirzepatide (MOUNJARO) 5 MG/0.5ML Solution Auto-injector Inject 5 mg under the skin every 7 days. 2 mL 1    Cholecalciferol (VITAMIN D) 125 MCG (5000 UT) Cap Take 1 capsule by mouth every day. 90 Capsule 2    carvedilol (COREG) 25 MG Tab Take 1 Tablet by mouth 2 times a day with meals. 180 Tablet 3    rosuvastatin (CRESTOR) 20 MG Tab Take 1 Tablet by mouth every evening. 90 Tablet 3    Blood Glucose Monitoring Suppl (BLOOD GLUCOSE MONITOR SYSTEM) w/Device Kit Test blood sugar as recommended by provider. 1 Kit 0    glucose blood strip Use one strip to test blood sugar once daily early morning before first meal. 100 Strip 3    Lancets Use one lancet to test blood sugar once daily early morning before first meal. 100 Each 3    Alcohol Swabs Wipe site with prep pad prior to injection. 100 Each 3    diclofenac sodium (VOLTAREN) 1 % Gel APPLY 2 GRAMS TOPICALLY 4 TIMES DAILY AS NEEDED FOR PAIN 200 g 0    furosemide (LASIX) 20 MG Tab TAKE 1 TABLET BY MOUTH ONCE A DAY ONLY IF NEEDED FOR LEG SWELLING. 90 Tablet 2    DULoxetine (CYMBALTA) 30 MG Cap DR Particles Take 1 cap(s) orally every day. 30 Capsule 0    aspirin 81 MG EC tablet Take 81 mg by mouth every day. CHECK WITH PRESCRIBING PHYSICIAN FOR INSTRUCTIONS FOR PROCEDURE 7/31/24      [DISCONTINUED] nystatin  "(MYCOSTATIN) 848522 UNIT/GM Cream topical cream Apply 1 g topically 2 times a day. (Patient not taking: Reported on 2/3/2025) 15 g 0    [DISCONTINUED] potassium chloride SA (KDUR) 20 MEQ Tab CR Take 1 tablet by mouth every day. 90 Tablet 2    [DISCONTINUED] fluconazole (DIFLUCAN) 150 MG tablet Take one tab weekly for 2 weeks. (Patient not taking: Reported on 2/3/2025) 2 Tablet 0    [DISCONTINUED] carvedilol (COREG) 12.5 MG Tab Take 1 Tablet by mouth 2 times a day with meals. 180 Tablet 3    [DISCONTINUED] spironolactone (ALDACTONE) 25 MG Tab Take 1 Tablet by mouth every day. 90 Tablet 3    NON SPECIFIED Knee brace 1 Each 0     No facility-administered encounter medications on file as of 3/18/2025.     ROS Complete review of systems negative except as noted in HPI/subjective           Objective     /60 (BP Location: Right arm, Patient Position: Sitting, BP Cuff Size: Adult)   Pulse 83   Resp 16   Ht 1.727 m (5' 8\")   Wt (!) 158 kg (348 lb)   SpO2 92%   BMI 52.91 kg/m²     Physical Exam  Vitals reviewed.   Constitutional:       Appearance: He is well-developed.      Comments: BMI 52   HENT:      Head: Normocephalic and atraumatic.   Eyes:      Pupils: Pupils are equal, round, and reactive to light.   Neck:      Vascular: No JVD.   Cardiovascular:      Rate and Rhythm: Normal rate and regular rhythm.      Pulses: Normal pulses.      Heart sounds: Normal heart sounds. No murmur heard.     No friction rub. No gallop.   Pulmonary:      Effort: Pulmonary effort is normal. No respiratory distress.      Breath sounds: Normal breath sounds.   Abdominal:      General: There is distension.   Musculoskeletal:      Right lower leg: No edema.      Left lower leg: No edema.   Skin:     General: Skin is warm and dry.      Findings: No erythema.   Neurological:      General: No focal deficit present.      Mental Status: He is alert and oriented to person, place, and time.   Psychiatric:         Behavior: Behavior normal. "       Lab Results   Component Value Date/Time    CHOLSTRLTOT 109 01/25/2025 07:25 AM    LDL 49 01/25/2025 07:25 AM    HDL 42 01/25/2025 07:25 AM    TRIGLYCERIDE 90 01/25/2025 07:25 AM       Lab Results   Component Value Date/Time    SODIUM 136 01/25/2025 07:25 AM    POTASSIUM 4.8 01/25/2025 07:25 AM    CHLORIDE 99 01/25/2025 07:25 AM    CO2 25 01/25/2025 07:25 AM    GLUCOSE 85 01/25/2025 07:25 AM    BUN 19 01/25/2025 07:25 AM    CREATININE 0.73 01/25/2025 07:25 AM    GLOMRATE 113 08/21/2023 01:56 PM     Lab Results   Component Value Date/Time    ALKPHOSPHAT 85 01/25/2025 07:25 AM    ASTSGOT 25 01/25/2025 07:25 AM    ALTSGPT 20 01/25/2025 07:25 AM    TBILIRUBIN 0.7 01/25/2025 07:25 AM      Echocardiogram 12/30/2012  Mild concentric left ventricular hypertrophy. Normal left ventricular   systolic function. Est EF 60%.   Interatrial septum bowed toward the left, consistent with elevated   right atrial pressures. Minimal appearance of bubbles. Valsalva suboptimal.   Structurally normal tricuspid valve. Mild tricuspid regurgitation.     RVSp 40-45 mmHg.       Transthoracic Echo Report 6/19/2023  Technically difficult study.  Normal left ventricular systolic function. The left ventricular   ejection fraction is visually estimated to be 65%.  The right ventricle is not well visualized.  The valves were suboptimally visualized.  No recent prior study for comparison.             E patch event monitor 11/21/2023-12/5/2023  Summary:     Short runs of supraventricular tachycardia that do not meet criteria for diagnosis of atrial flutter or atrial fibrillation.   Rare premature atrial complexes (<1%) and premature ventricular complexes (<1%).   No malignant arrhythmias identified.   No sinus pause.   No significant AV block.      Cardiac Catheterization report 7/31/2024  Referring MD: Dr. 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  HEMODYNAMICS              Ao: 107/69 mmHg              LVEDP: 18 mmHg              Gradient on LV pullback: No     II. CORONARY ANGIOGRAPHY:  Left main coronary artery: Large-caliber vessel which becomes extremely aneurysmal at the confluence of the OM/ramus, circumflex, LAD with no obstructive lesions.  Left anterior descending artery: Large caliber ectatic vessel that is largely aneurysmal in its proximal to midportion.  Continues to supply a large positively remodeled vessels with heavy nonobstructive atherosclerotic burden.  Left circumflex coronary artery: Moderate to large caliber vessel arising from the aneurysmal segment of the distal left main without obstructive coronary disease.  There is a high obtuse marginal/ramus which is small to moderate and also free from angiographically high-grade disease.  Right coronary artery: Moderate caliber vessel with diffuse nonobstructive atherosclerosis and a heavily positively remodeled vessel.           Assessment & Plan     1. CHF (NYHA class II, ACC/AHA stage C) ([facility])  Referral to Pulmonary and Sleep Medicine    spironolactone (ALDACTONE) 50 MG Tab    Basic Metabolic Panel      2. Chronic heart failure with preserved ejection fraction ([facility])  Referral to Pulmonary and Sleep Medicine    spironolactone (ALDACTONE) 50 MG Tab    Basic Metabolic Panel      3. Type 2 diabetes mellitus without complication, without long-term current use of insulin ([facility])  Referral to Pulmonary and Sleep Medicine    Basic Metabolic Panel      4. ACC/AHA stage C systolic heart failure ([facility])   Referral to Pulmonary and Sleep Medicine    Basic Metabolic Panel      5. Coronary artery disease due to lipid rich plaque  Referral to Pulmonary and Sleep Medicine    Basic Metabolic Panel      6. Essential hypertension  Referral to Pulmonary and Sleep Medicine    Basic Metabolic Panel      7. High risk medication use        8. HINOJOSA (dyspnea on exertion)        9. MARY (obstructive sleep apnea)        10. Class 3 severe obesity due to excess calories with serious comorbidity and body mass index (BMI) of 50.0 to 59.9 in adult (HCC)            Medical Decision Making: Today's Assessment/Status/Plan:        HFpEF, stage C, NYHA class 3, LVEF 65%  -Echo from [facility] showed an EF of 45 to 50% from 7/4/2022, recent echo 6/19/2023 showed improvement to 65%  -Consider repeating echo next year  -Patient fairly euvolemic except some trace lower extremity edema  -Continue furosemide 20 mg daily as needed, he mentions he has been taking every other day, recommend perhaps trying to go to daily for 1 week for his slight swelling  -Hold potassium supplementation d/t increase spironolactone   -Increase spironolactone 50 mg daily  - SGLT2 discontinued continues to infection.  Continue SGL 1 pharmacy.  -low threshold to consider ARNI in the future     Hypertension  Stable  -Continue carvedilol 25 mg twice a day  -Continue furosemide, spironolactone     Dyslipidemia  -Recent LDL 49 1/2025  -Continue rosuvastatin 20 mg daily     CAD  -Angiogram showed Diffuse coronary ectasia with aneurysmal formation of the proximal vessels  -Continue rosuvastatin 20 mg daily  -Continue aspirin 81 mg daily     Morbid obesity:  -Currently taking Ozempic    MARY  -We discussed importance of treating nocturnal hypoxia due to causing increased risk for cardiac disease; patient verbalizes understanding.  -referred to sleep medicine    FU in clinic in 6 months with Dr. Garcia. Sooner if needed.    Patient verbalizes understanding and agrees with the plan of care.        STANFORD Acosta, CCK, HF-Cert   [facility]  [phone]    PLEASE NOTE: This Note was created using voice recognition Software. I have made every reasonable attempt to correct obvious errors, but I expect that there are errors of grammar and possibly content that I did not discover before finalizing the note

## 2025-03-21 PROCEDURE — RXMED WILLOW AMBULATORY MEDICATION CHARGE: Performed by: NURSE PRACTITIONER

## 2025-03-24 ENCOUNTER — PHARMACY VISIT (OUTPATIENT)
Dept: PHARMACY | Facility: MEDICAL CENTER | Age: 60
End: 2025-03-24
Payer: COMMERCIAL

## 2025-03-28 ENCOUNTER — TELEPHONE (OUTPATIENT)
Dept: VASCULAR LAB | Facility: MEDICAL CENTER | Age: 60
End: 2025-03-28
Payer: COMMERCIAL

## 2025-03-28 NOTE — TELEPHONE ENCOUNTER
Noted Mounjaro appeal now approved. Called pt to r/s CHF f/u for titration - r/s f/u for 4/24/25.    Cassius Ventura, PharmD, BCACP

## 2025-04-12 ENCOUNTER — HOSPITAL ENCOUNTER (OUTPATIENT)
Dept: LAB | Facility: MEDICAL CENTER | Age: 60
End: 2025-04-12
Attending: NURSE PRACTITIONER
Payer: COMMERCIAL

## 2025-04-12 DIAGNOSIS — I25.5 ISCHEMIC CARDIOMYOPATHY: ICD-10-CM

## 2025-04-12 DIAGNOSIS — I50.9 CHF (NYHA CLASS II, ACC/AHA STAGE C) (HCC): ICD-10-CM

## 2025-04-12 DIAGNOSIS — I50.32 CHRONIC HEART FAILURE WITH PRESERVED EJECTION FRACTION (HCC): ICD-10-CM

## 2025-04-12 DIAGNOSIS — E66.813 CLASS 3 SEVERE OBESITY DUE TO EXCESS CALORIES WITH SERIOUS COMORBIDITY AND BODY MASS INDEX (BMI) OF 50.0 TO 59.9 IN ADULT: ICD-10-CM

## 2025-04-12 DIAGNOSIS — E11.9 TYPE 2 DIABETES MELLITUS WITHOUT COMPLICATION, WITHOUT LONG-TERM CURRENT USE OF INSULIN (HCC): ICD-10-CM

## 2025-04-12 DIAGNOSIS — I50.20 ACC/AHA STAGE C SYSTOLIC HEART FAILURE (HCC): ICD-10-CM

## 2025-04-12 DIAGNOSIS — Z86.718 HISTORY OF DVT IN ADULTHOOD: ICD-10-CM

## 2025-04-12 DIAGNOSIS — E78.2 MIXED HYPERLIPIDEMIA: ICD-10-CM

## 2025-04-12 DIAGNOSIS — I10 PRIMARY HYPERTENSION: ICD-10-CM

## 2025-04-12 DIAGNOSIS — I10 ESSENTIAL HYPERTENSION: ICD-10-CM

## 2025-04-12 DIAGNOSIS — I25.10 CORONARY ARTERY DISEASE DUE TO LIPID RICH PLAQUE: ICD-10-CM

## 2025-04-12 DIAGNOSIS — I25.83 CORONARY ARTERY DISEASE DUE TO LIPID RICH PLAQUE: ICD-10-CM

## 2025-04-12 LAB
ALBUMIN SERPL BCP-MCNC: 4 G/DL (ref 3.2–4.9)
ALBUMIN/GLOB SERPL: 1.1 G/DL
ALP SERPL-CCNC: 70 U/L (ref 30–99)
ALT SERPL-CCNC: 19 U/L (ref 2–50)
ANION GAP SERPL CALC-SCNC: 10 MMOL/L (ref 7–16)
ANION GAP SERPL CALC-SCNC: 8 MMOL/L (ref 7–16)
AST SERPL-CCNC: 28 U/L (ref 12–45)
BILIRUB SERPL-MCNC: 0.6 MG/DL (ref 0.1–1.5)
BUN SERPL-MCNC: 22 MG/DL (ref 8–22)
BUN SERPL-MCNC: 22 MG/DL (ref 8–22)
CALCIUM ALBUM COR SERPL-MCNC: 8.8 MG/DL (ref 8.5–10.5)
CALCIUM SERPL-MCNC: 8.7 MG/DL (ref 8.5–10.5)
CALCIUM SERPL-MCNC: 8.8 MG/DL (ref 8.5–10.5)
CHLORIDE SERPL-SCNC: 103 MMOL/L (ref 96–112)
CHLORIDE SERPL-SCNC: 104 MMOL/L (ref 96–112)
CHOLEST SERPL-MCNC: 91 MG/DL (ref 100–199)
CO2 SERPL-SCNC: 25 MMOL/L (ref 20–33)
CO2 SERPL-SCNC: 26 MMOL/L (ref 20–33)
CREAT SERPL-MCNC: 0.85 MG/DL (ref 0.5–1.4)
CREAT SERPL-MCNC: 0.86 MG/DL (ref 0.5–1.4)
EST. AVERAGE GLUCOSE BLD GHB EST-MCNC: 131 MG/DL
FASTING STATUS PATIENT QL REPORTED: NORMAL
GFR SERPLBLD CREATININE-BSD FMLA CKD-EPI: 100 ML/MIN/1.73 M 2
GFR SERPLBLD CREATININE-BSD FMLA CKD-EPI: 100 ML/MIN/1.73 M 2
GLOBULIN SER CALC-MCNC: 3.7 G/DL (ref 1.9–3.5)
GLUCOSE SERPL-MCNC: 91 MG/DL (ref 65–99)
GLUCOSE SERPL-MCNC: 92 MG/DL (ref 65–99)
HBA1C MFR BLD: 6.2 % (ref 4–5.6)
HDLC SERPL-MCNC: 37 MG/DL
LDLC SERPL CALC-MCNC: 36 MG/DL
POTASSIUM SERPL-SCNC: 4.4 MMOL/L (ref 3.6–5.5)
POTASSIUM SERPL-SCNC: 4.4 MMOL/L (ref 3.6–5.5)
PROT SERPL-MCNC: 7.7 G/DL (ref 6–8.2)
SODIUM SERPL-SCNC: 138 MMOL/L (ref 135–145)
SODIUM SERPL-SCNC: 138 MMOL/L (ref 135–145)
TRIGL SERPL-MCNC: 88 MG/DL (ref 0–149)

## 2025-04-12 PROCEDURE — 36415 COLL VENOUS BLD VENIPUNCTURE: CPT

## 2025-04-12 PROCEDURE — 81241 F5 GENE: CPT

## 2025-04-12 PROCEDURE — 83036 HEMOGLOBIN GLYCOSYLATED A1C: CPT

## 2025-04-12 PROCEDURE — 80053 COMPREHEN METABOLIC PANEL: CPT

## 2025-04-12 PROCEDURE — 80048 BASIC METABOLIC PNL TOTAL CA: CPT

## 2025-04-12 PROCEDURE — 80061 LIPID PANEL: CPT

## 2025-04-14 ENCOUNTER — RESULTS FOLLOW-UP (OUTPATIENT)
Dept: CARDIOLOGY | Facility: MEDICAL CENTER | Age: 60
End: 2025-04-14
Payer: COMMERCIAL

## 2025-04-14 ENCOUNTER — RESULTS FOLLOW-UP (OUTPATIENT)
Dept: MEDICAL GROUP | Facility: CLINIC | Age: 60
End: 2025-04-14
Payer: COMMERCIAL

## 2025-04-15 PROCEDURE — RXMED WILLOW AMBULATORY MEDICATION CHARGE: Performed by: NURSE PRACTITIONER

## 2025-04-17 ENCOUNTER — APPOINTMENT (OUTPATIENT)
Dept: SLEEP MEDICINE | Facility: MEDICAL CENTER | Age: 60
End: 2025-04-17
Payer: COMMERCIAL

## 2025-04-18 ENCOUNTER — OFFICE VISIT (OUTPATIENT)
Dept: SLEEP MEDICINE | Facility: MEDICAL CENTER | Age: 60
End: 2025-04-18
Payer: COMMERCIAL

## 2025-04-18 VITALS
SYSTOLIC BLOOD PRESSURE: 100 MMHG | OXYGEN SATURATION: 93 % | DIASTOLIC BLOOD PRESSURE: 54 MMHG | HEIGHT: 68 IN | HEART RATE: 70 BPM | WEIGHT: 315 LBS | BODY MASS INDEX: 47.74 KG/M2

## 2025-04-18 DIAGNOSIS — J41.0 SIMPLE CHRONIC BRONCHITIS (HCC): ICD-10-CM

## 2025-04-18 DIAGNOSIS — G47.33 OSA (OBSTRUCTIVE SLEEP APNEA): Primary | ICD-10-CM

## 2025-04-18 LAB — F5 P.R506Q BLD/T QL: NEGATIVE

## 2025-04-18 PROCEDURE — 99213 OFFICE O/P EST LOW 20 MIN: CPT

## 2025-04-18 PROCEDURE — 99215 OFFICE O/P EST HI 40 MIN: CPT

## 2025-04-18 RX ORDER — LISINOPRIL 5 MG/1
1 TABLET ORAL
COMMUNITY

## 2025-04-18 RX ORDER — PRAVASTATIN SODIUM 20 MG
TABLET ORAL
COMMUNITY
End: 2025-04-24

## 2025-04-18 ASSESSMENT — ENCOUNTER SYMPTOMS
WHEEZING: 0
CHILLS: 0
PALPITATIONS: 0
COUGH: 0
SPUTUM PRODUCTION: 0
DIZZINESS: 0
HEMOPTYSIS: 0
SHORTNESS OF BREATH: 1
WEIGHT LOSS: 0
STRIDOR: 0
HEARTBURN: 0
DEPRESSION: 0
FEVER: 0

## 2025-04-18 ASSESSMENT — FIBROSIS 4 INDEX: FIB4 SCORE: 1.96

## 2025-04-18 NOTE — PATIENT INSTRUCTIONS
There are potential cardiac and neurologic risks associated with untreated sleep apnea including heart disease, high blood pressure, pulmonary arterial hypertension, cardiac arrhythmias, heart attack or stroke.     Patients have increased risk of motor vehicle accidents, diabetes, chronic kidney disease and nonalcoholic liver disease if their sleep apnea is untreated or you don't use your device.     You are cautioned against driving while sleepy for their safety and safety of others on the road.     Notify clinic if you develop symptoms of daytime sleepiness, morning headaches, difficulty concentrating, or new or worsening high blood pressure     Adjust straps or replace cushion with any mask leaks       Try a nasal pillow mask if you are feeling claustrophobic or notify their DME company for alternative mask options.    Equipment replacement schedule:  Mask cushion every month  Nasal pillows 2 times per month  Mask every 6 months  Head gear every 6 months  Tubing every 3 months  Ultra-fine filters 2 times per month  Foam filter every 6 months  Humidifier chamber every 6 months  Chin strap every 6 months    Please return within 90 days if you are beginning therapy, we need to document your compliance to ensure continued payment from your insurance company. It is important for you to notify us with any concerns regarding therapy that is preventing you from using your device, so we can help you find the right treatment and have continued payment from your insurance.

## 2025-04-18 NOTE — PROGRESS NOTES
Pulmonary Clinic follow up    Date of Service: 4/18/2025    Reason for follow up:  Establish Care (Referred by Calista Alvarez A.P.R.N/Dx: Hx of sleep apnea, Essential hypertension)    History of Present Illness:     Deo Beard is a 59 y.o. male being evaluated in clinic today for MARY evaluation, referred by Calista RAM. Patient is followed in the cardiology clinic for HFpEF stage C. PMH includes MARY, emphysema, HTN, HLD, hypoxia, ischemic cardiomyopathy, migraines, DM II, scoliosis, methamphetamines use in 1990s. Patient was on PAP therapy 20 years ago. Patient has a smoking history of 50 pack years, quit in 2012. Patient has never had formal PFT but endorses chronic bronchitis that was worse while he was smoking. Patient was started on Mounjaro injections for DM II which will also help his MARY with the weight loss. Patient has hx of gastric sleeve surgery in [place] that was not successful, and he states the surgeon ended up committing suicide d/t significant malpractice suits with his unsuccessful surgeries. Patient had questions about the Inspire device although his BMI is too elevated at this time. 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     As per supplemental questionnaire to be scanned or imported into chart:    [place] Sleepiness Score: 13    Sleep Schedule  Bedtime: 8pm  Wake time: 4am  Sleep-onset latency: no  Awakenings from sleep: 2 - urination  Difficulty falling back asleep: no  Bedroom partner: yes   Naps: No     DAYTIME SYMPTOMS:   Excessive daytime sleepiness: Yes  Daytime fatigue: Yes  Difficulty concentrating: No   Memory problems: No   Irritability:Yes  Work/school performance issues: No   Sleepiness with driving: No   Caffeine/stimulant use: Yes - coffee and monster  Alcohol use:No     SLEEP RELATED SYMPTOMS  Snoring: Yes  Witnessed apnea or gasping/choking: No   Dry mouth or mouth breathing: No   Night Sweating: No   Teeth grinding/biting: No  "  Morning headaches: No   Refreshed Upon Awakening: Yes     SLEEP RELATED BEHAVIORS:  Parasomnias (walking, talking, eating, violence): No   Leg kicking: Yes  Restless legs - \"urge to move\": Yes  Nightmares: No  Recurrent: No   Dream enactment: No      NARCOLEPSY:  Cataplexy: No   Sleep paralysis: No   Sleep attacks: No   Hypnagogic/hypnopompic hallucinations: No      Review of Systems   Constitutional:  Positive for malaise/fatigue. Negative for chills, fever and weight loss.   Respiratory:  Positive for shortness of breath. Negative for cough, hemoptysis, sputum production, wheezing and stridor.    Cardiovascular:  Negative for chest pain, palpitations and leg swelling.   Gastrointestinal:  Negative for heartburn.   Neurological:  Negative for dizziness.   Psychiatric/Behavioral:  Negative for depression.        Current Outpatient Medications on File Prior to Visit   Medication Sig Dispense Refill    lisinopril (PRINIVIL) 5 MG Tab Take 1 Tablet by mouth every day.      pravastatin (PRAVACHOL) 20 MG Tab 1 tab(s) orally once a day (at bedtime)      spironolactone (ALDACTONE) 50 MG Tab Take 1 Tablet by mouth every day. 90 Tablet 3    Tirzepatide (MOUNJARO) 5 MG/0.5ML Solution Auto-injector Inject 5 mg under the skin every 7 days. 2 mL 1    Cholecalciferol (VITAMIN D) 125 MCG (5000 UT) Cap Take 1 capsule by mouth every day. 90 Capsule 2    carvedilol (COREG) 25 MG Tab Take 1 Tablet by mouth 2 times a day with meals. 180 Tablet 3    rosuvastatin (CRESTOR) 20 MG Tab Take 1 Tablet by mouth every evening. 90 Tablet 3    Blood Glucose Monitoring Suppl (BLOOD GLUCOSE MONITOR SYSTEM) w/Device Kit Test blood sugar as recommended by provider. 1 Kit 0    glucose blood strip Use one strip to test blood sugar once daily early morning before first meal. 100 Strip 3    Lancets Use one lancet to test blood sugar once daily early morning before first meal. 100 Each 3    Alcohol Swabs Wipe site with prep pad prior to injection. 100 " Each 3    diclofenac sodium (VOLTAREN) 1 % Gel APPLY 2 GRAMS TOPICALLY 4 TIMES DAILY AS NEEDED FOR PAIN 200 g 0    furosemide (LASIX) 20 MG Tab TAKE 1 TABLET BY MOUTH ONCE A DAY ONLY IF NEEDED FOR LEG SWELLING. 90 Tablet 2    DULoxetine (CYMBALTA) 30 MG Cap DR Particles Take 1 cap(s) orally every day. 30 Capsule 0    aspirin 81 MG EC tablet Take 81 mg by mouth every day. CHECK WITH PRESCRIBING PHYSICIAN FOR INSTRUCTIONS FOR PROCEDURE 24      NON SPECIFIED Knee brace 1 Each 0    [DISCONTINUED] carvedilol (COREG) 12.5 MG Tab Take 1 Tablet by mouth 2 times a day with meals. 180 Tablet 3     No current facility-administered medications on file prior to visit.       Social History     Tobacco Use    Smoking status: Former     Current packs/day: 0.00     Average packs/day: 2.5 packs/day for 20.0 years (50.0 ttl pk-yrs)     Types: Cigarettes     Start date: 1992     Quit date: 2012     Years since quittin.5    Smokeless tobacco: Never   Vaping Use    Vaping status: Never Used   Substance Use Topics    Alcohol use: Not Currently     Comment: every 2 months    Drug use: Not Currently     Types: Intravenous     Comment: history of meth times one        Past Medical History:   Diagnosis Date    Abnormal EKG 2012    Abnormal EKG 2012    Bronchitis 2024    history of    CAD (coronary artery disease)     Cataract 2024    cataracts at present both eyes, no surgery as yet    Dental disorder 2024    upper and lower dentures    Depression 2024    medicated    Diabetes (HCC) 2024    medicated    High cholesterol 2024    medicated    HTN (hypertension) 2024    medicated    Hyperlipidemia     Hypoxia 2012    Ischemic cardiomyopathy     Leukocytosis (leucocytosis) 2012    Medically noncompliant 2013    MI (myocardial infarction) (HCC) 2024    Silent MI , ( old), noted on EKG in , no residual issues    Migraine     Obesity (BMI  "30-39.9) 07/11/2018    Pain 07/18/2024    back    Personal history of venous thrombosis and embolism 07/18/2024    left leg \"years ago\"    Pneumonia 07/18/2024    5 months ago    Polysubstance abuse (HCC) 07/18/2013    [place] spotted fever     June 2017    Sleep apnea, obstructive 07/18/2024    CPAP 2010, doesn't use at present, lost 100 lbs    Snoring 07/18/2024    at times    Substance abuse (HCC)     2007; Meth    Tobacco abuse 07/18/2013       Past Surgical History:   Procedure Laterality Date    GASTRIC BANDING LAPAROSCOPIC  2015    gastric sleeve and switch    PB INCIS/DRAIN FOREARM DEEP ABSCESS  1996    left AC due to IV drug abuse    HYDROCELECTOMY ADULT      ORCHIECTOMY Right     ORIF, WRIST      left wrist       Patient has no known allergies.    Family History   Problem Relation Age of Onset    Cancer Mother         intestine    Heart Disease Father         CHF    Stroke Father     Heart Attack Father     Cancer Brother         Colon Cancer    Arthritis Maternal Grandmother     Arthritis Maternal Grandfather     Arthritis Paternal Grandmother     Cancer Paternal Grandfather     Heart Disease Paternal Grandfather        /54 (BP Location: Right arm, Patient Position: Sitting, BP Cuff Size: Large adult)   Pulse 70   Ht 1.727 m (5' 8\")   Wt (!) 156 kg (343 lb)   SpO2 93%      Physical Exam  Constitutional:       General: He is not in acute distress.  HENT:      Head: Normocephalic.      Nose: Nose normal.      Mouth/Throat:      Mouth: Mucous membranes are moist.      Comments: (+) oral crowding   Eyes:      Conjunctiva/sclera: Conjunctivae normal.   Cardiovascular:      Rate and Rhythm: Normal rate and regular rhythm.      Heart sounds: Heart sounds are distant. No murmur heard.  Pulmonary:      Effort: Pulmonary effort is normal. No respiratory distress.      Breath sounds: Normal breath sounds. No wheezing.   Abdominal:      General: There is no distension.   Musculoskeletal:      Right " lower leg: No edema.      Left lower leg: No edema.   Skin:     General: Skin is warm and dry.      Capillary Refill: Capillary refill takes less than 2 seconds.      Nails: There is no clubbing.   Neurological:      General: No focal deficit present.      Mental Status: He is alert and oriented to person, place, and time.   Psychiatric:         Mood and Affect: Mood normal.         Results:    CT chest 8/21/2023:    Lungs: The lungs are clear without focal infiltrate or consolidation. There is   no pleural effusion or pneumothorax. The airway is patent without bronchial wall   thickening.     Mediastinum: The heart is not enlarged. There is no pericardial effusion. The   aorta is normal caliber and unremarkable. There are no enlarged hilar or   mediastinal lymph nodes. There is intrathoracic extension of the left thyroid,   as before. The esophagus is unremarkable.     Echocardiogram 6/19/2023:    Left Ventricle  3mL of contrast was used to enhance visualization of the endocardial   border. IV was started by Ba Martínez RN at the left thumb. The left   ventricle is not well visualized. Normal left ventricular chamber size.   Moderate concentric left ventricular hypertrophy. Diastolic function is   difficult to assess secondary to suboptimal diastolic parameters.   Normal left ventricular systolic function. The left ventricular   ejection fraction is visually estimated to be 65%.     Right Ventricle  The right ventricle is not well visualized.     Right Atrium  The right atrium is not well visualized. The inferior vena cava is not   well visualized.     Left Atrium  The left atrium is not well visualized.     Mitral Valve  The mitral valve is not well visualized. Structurally normal mitral   valve without significant stenosis. Trace mitral regurgitation.     Aortic Valve  The aortic valve is not well visualized. No aortic valve stenosis. No   aortic insufficiency.     Tricuspid Valve  The tricuspid valve is not well  visualized.     Pulmonic Valve  The pulmonic valve is not well visualized.     Pericardium  Pericardium not well visualized. No pericardial effusion.     Aorta  The ascending aorta is not well visualized.        Vaccinations:    Encouraged annual covid and flu vaccines      Assessment & Plan  MARY (obstructive sleep apnea)    PAP therapy 20 years ago, he stopped after losing weight initially from bariatric surgery which he later regained. Patient had questions about Inspire although his BMI too elevated to qualify. We will order titration study and patient agreeable to PAP therapy if he qualifies. Patient can be re-evaluated for inspire if he has weight loss on Tirzepatide     Orders:    Polysomnography Titration    Simple chronic bronchitis (HCC)    Order PFT, likely with COPD from smoking history. Patient endorses chronic bronchitis, slightly improved after he stopped smoking     Orders:    PULMONARY FUNCTION TESTS -Test requested: Complete Pulmonary Function Test; Future      Return in about 3 months (around 7/18/2025), or if symptoms worsen or fail to improve, for f/u on sleep apnea for compliance and PFT results .     This note was generated using voice recognition software which has a chance of producing errors of grammar and possibly content.  I have made every reasonable attempt to find and correct any obvious errors, but it should be expected that some may not be found prior to finalization of this note.    Time spent in record review prior to patient arrival, reviewing results, and in face-to-face encounter totaled 41 min, excluding any procedures if performed.    Cezar RAM  [facility]

## 2025-04-21 ENCOUNTER — PHARMACY VISIT (OUTPATIENT)
Dept: PHARMACY | Facility: MEDICAL CENTER | Age: 60
End: 2025-04-21
Payer: COMMERCIAL

## 2025-04-24 ENCOUNTER — NON-PROVIDER VISIT (OUTPATIENT)
Dept: CARDIOLOGY | Facility: MEDICAL CENTER | Age: 60
End: 2025-04-24
Attending: NURSE PRACTITIONER
Payer: COMMERCIAL

## 2025-04-24 VITALS
SYSTOLIC BLOOD PRESSURE: 103 MMHG | WEIGHT: 315 LBS | HEART RATE: 80 BPM | BODY MASS INDEX: 51.7 KG/M2 | DIASTOLIC BLOOD PRESSURE: 53 MMHG

## 2025-04-24 DIAGNOSIS — E11.9 TYPE 2 DIABETES MELLITUS WITHOUT COMPLICATION, WITHOUT LONG-TERM CURRENT USE OF INSULIN (HCC): ICD-10-CM

## 2025-04-24 DIAGNOSIS — I50.9 CONGESTIVE HEART FAILURE, UNSPECIFIED HF CHRONICITY, UNSPECIFIED HEART FAILURE TYPE (HCC): ICD-10-CM

## 2025-04-24 PROCEDURE — 99212 OFFICE O/P EST SF 10 MIN: CPT | Performed by: PHARMACIST

## 2025-04-24 ASSESSMENT — FIBROSIS 4 INDEX: FIB4 SCORE: 1.96

## 2025-04-24 NOTE — PROGRESS NOTES
CHF Pharmacotherapy Visit    Date of Referral: 1/30/25    Deo Beard is here for DM, CHF, and lipids    HPI  Pertinent Interval History since last visit:   Pt was able to obtain and start Mounjaro.  Met w/ cards midlevel - AA dose increased.    Potential Barriers to Care:  Adherence: denies missed doses  Side effects: none  Affordability: no issues    Diabetes Data Review:  Lab Results   Component Value Date/Time    HBA1C 6.2 (H) 04/12/2025 08:19 AM      Home Blood Glucose Readings:  Not routinely monitoring d/t controlled A1c    Current diabetes medications:  Mounjaro 5 mg SQ Q7D      Lipids Data Review:   Lab Results   Component Value Date/Time    CHOLSTRLTOT 91 (L) 04/12/2025 08:19 AM    LDL 36 04/12/2025 08:19 AM    HDL 37 (A) 04/12/2025 08:19 AM    TRIGLYCERIDE 88 04/12/2025 08:19 AM       Current lipid medications:  rosuvastatin 20 mg daily     Most recent EF:  65% 06/19/23  45% to 50% 07/04/22    Current CHF Medications - including dose:   Entresto or ACE/ARB: Lisinopril 5 mg once daily  Beta blocker: carvedilol 25 mg BID   Diuretic: Furosemide 20 mg once daily PRN - has not been using  Aldosterone antagonist: spironolactone 50 mg daily  SGLT2i: None - developed UTI/yeast infection w/ Farxiga    Home BP and HR: Not routinely monitoring at home.    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 Has been working in his yard more lately.  Diet: Decreased appetite lately w/ Mounjaro.  Breakfast - Sausage and egg croissant  Lunch - Bologna sandwich w/ chips  Dinner - Varies. Typically meat and vegetable  Snacks - None typically  Drinks - Pepsi x 1-2 per day (regular - he's unwilling to DC), water, unsweetened ice tea    DATA REVIEW  Vitals:    04/24/25 1612   BP: 103/53   Pulse: 80       Lab Results   Component Value Date/Time    SODIUM 138 04/12/2025 08:19 AM    POTASSIUM 4.4  "04/12/2025 08:19 AM    CHLORIDE 103 04/12/2025 08:19 AM    CO2 25 04/12/2025 08:19 AM    GLUCOSE 91 04/12/2025 08:19 AM    BUN 22 04/12/2025 08:19 AM    CREATININE 0.86 04/12/2025 08:19 AM    GLOMRATE 113 08/21/2023 01:56 PM     Lab Results   Component Value Date/Time    ALKPHOSPHAT 70 04/12/2025 08:19 AM    ASTSGOT 28 04/12/2025 08:19 AM    ALTSGPT 19 04/12/2025 08:19 AM    TBILIRUBIN 0.6 04/12/2025 08:19 AM    INR 0.93 07/31/2024 09:43 AM    ALBUMIN 4.0 04/12/2025 08:19 AM      No components found for: \"MICROALBUMINCREATRATIOURINE\"    Renal function:  Calculated creatinine clearance: >100 ml/min   eGFR: >60 mL/min/1.73 m2    Recent Imaging Studies:    None since last visit    ASSESSMENT AND PLAN  T2DM  Goals: FBG 80 - 130, 2hPP < 180, a1c < 7.0%  Repeat A1c was 6.2% on 4/12/25 which is at goal and worsened from previous (5.9% on 1/25/2025).  No routinely monitoring his home BG d/t controlled DM.  Since last visit, pt was finally able to obtain and start Mounjaro. Ins initially denied [place] but subsequently approved appeal.  Pt notes increased appetite suppression w/ GIP/GLP1 vs GLP1.    Diabetes medications (changes are bolded)  INCREASE Mounjaro up to 7.5 mg SQ Q7D  Will exhaust current 30 day supply of 5 mg prior to starting 7.5 mg dose.    CHF  Since last visit, pt saw YO Grady - spironolactone dose increase.  Pt with HFpEF and has been stable. Does note some HINOJOSA.  Clinic vitals at goal.    CHF medications (changes are bolded)  Entresto or ACE/ARB: none  Beta blocker: carvedilol 25 mg BID   Diuretic: none  Aldosterone antagonist: spironolactone 50 mg daily  SGLT2i: None - developed UTI/yeast infection    3. Hyperlipidemia  Patient type: Secondary Prevention - hx of MI  Goal: LDL-C:   <70 mg/dL  Most recent lipid panel shows all markers at goal aside from HDL. Discussed dietary measures to improve.  Of note, pt had both pravastatin and rosuvastatin on med list - he is unsure if he's taking both. Advised pt to " DC pravastatin and to take rosuvastatin only moving forward.    Lipid medications (changes are bolded)  Rosuvastatin 20 mg daily    4. Lifestyle   Recommendations From Today's Visit:   Diet: Maximize lean proteins and veggies. Cut out/down on carbs. Avoid simple sugars.   Exercise: Start simple by walking daily and Increase as tolerated    Blood Work Ordered At Today's visit:   None - will repeat lipid panel & A1c in mid July (3 months from last)    Follow-Up:   8 weeks    Cassius Ventura, [place]    CC:  Marian Chang P.A.-C.

## 2025-05-15 PROCEDURE — RXMED WILLOW AMBULATORY MEDICATION CHARGE: Performed by: NURSE PRACTITIONER

## 2025-05-19 ENCOUNTER — PHARMACY VISIT (OUTPATIENT)
Dept: PHARMACY | Facility: MEDICAL CENTER | Age: 60
End: 2025-05-19
Payer: COMMERCIAL

## 2025-05-19 ENCOUNTER — NON-PROVIDER VISIT (OUTPATIENT)
Dept: SLEEP MEDICINE | Facility: MEDICAL CENTER | Age: 60
End: 2025-05-19
Payer: COMMERCIAL

## 2025-05-19 VITALS — WEIGHT: 315 LBS | BODY MASS INDEX: 49.44 KG/M2 | HEIGHT: 67 IN

## 2025-05-19 DIAGNOSIS — G47.33 OSA (OBSTRUCTIVE SLEEP APNEA): Primary | ICD-10-CM

## 2025-05-19 DIAGNOSIS — J41.0 SIMPLE CHRONIC BRONCHITIS (HCC): ICD-10-CM

## 2025-05-19 PROCEDURE — 94729 DIFFUSING CAPACITY: CPT

## 2025-05-19 PROCEDURE — 94726 PLETHYSMOGRAPHY LUNG VOLUMES: CPT | Mod: 26 | Performed by: INTERNAL MEDICINE

## 2025-05-19 PROCEDURE — 94726 PLETHYSMOGRAPHY LUNG VOLUMES: CPT

## 2025-05-19 PROCEDURE — 94729 DIFFUSING CAPACITY: CPT | Mod: 26 | Performed by: INTERNAL MEDICINE

## 2025-05-19 PROCEDURE — 94060 EVALUATION OF WHEEZING: CPT | Mod: 26 | Performed by: INTERNAL MEDICINE

## 2025-05-19 PROCEDURE — 94060 EVALUATION OF WHEEZING: CPT

## 2025-05-19 ASSESSMENT — FIBROSIS 4 INDEX: FIB4 SCORE: 1.96

## 2025-05-19 NOTE — PROCEDURES
Technician: Kenzie Pantoja RRT, CPFT  Good patient effort & cooperation.  The results of this test meet the ATS/ERS standards for acceptability & reproducibility.  Test was performed on the Kryptiq Body Plethysmograph-Elite DX system.  Predicted equations for Spirometry are GLI-2012, ITS for lung volumes, and GLI-2017 for DLCO.  The DLCO was uncorrected for Hgb.  A bronchodilator of Ventolin HFA -2puffs via spacer administered.  DLCO performed during dilation period.   Interpretation;

## 2025-05-27 PROCEDURE — RXMED WILLOW AMBULATORY MEDICATION CHARGE: Performed by: STUDENT IN AN ORGANIZED HEALTH CARE EDUCATION/TRAINING PROGRAM

## 2025-05-27 PROCEDURE — RXMED WILLOW AMBULATORY MEDICATION CHARGE: Performed by: NURSE PRACTITIONER

## 2025-05-28 ENCOUNTER — PHARMACY VISIT (OUTPATIENT)
Dept: PHARMACY | Facility: MEDICAL CENTER | Age: 60
End: 2025-05-28
Payer: COMMERCIAL

## 2025-06-05 ENCOUNTER — OFFICE VISIT (OUTPATIENT)
Dept: SLEEP MEDICINE | Facility: MEDICAL CENTER | Age: 60
End: 2025-06-05
Payer: COMMERCIAL

## 2025-06-05 VITALS
HEIGHT: 68 IN | OXYGEN SATURATION: 93 % | BODY MASS INDEX: 47.74 KG/M2 | SYSTOLIC BLOOD PRESSURE: 108 MMHG | DIASTOLIC BLOOD PRESSURE: 64 MMHG | HEART RATE: 88 BPM | WEIGHT: 315 LBS

## 2025-06-05 DIAGNOSIS — J45.20 MILD INTERMITTENT REACTIVE AIRWAY DISEASE WITHOUT COMPLICATION: Primary | ICD-10-CM

## 2025-06-05 PROCEDURE — 99214 OFFICE O/P EST MOD 30 MIN: CPT

## 2025-06-05 PROCEDURE — 99212 OFFICE O/P EST SF 10 MIN: CPT

## 2025-06-05 PROCEDURE — 3078F DIAST BP <80 MM HG: CPT

## 2025-06-05 PROCEDURE — 3074F SYST BP LT 130 MM HG: CPT

## 2025-06-05 RX ORDER — LEVALBUTEROL TARTRATE 45 UG/1
1-2 AEROSOL, METERED ORAL EVERY 4 HOURS PRN
Qty: 15 G | Refills: 11 | Status: SHIPPED | OUTPATIENT
Start: 2025-06-05

## 2025-06-05 RX ORDER — BUDESONIDE AND FORMOTEROL FUMARATE DIHYDRATE 80; 4.5 UG/1; UG/1
2 AEROSOL RESPIRATORY (INHALATION) 2 TIMES DAILY
Qty: 10.3 G | Refills: 11 | Status: SHIPPED | OUTPATIENT
Start: 2025-06-05

## 2025-06-05 ASSESSMENT — FIBROSIS 4 INDEX: FIB4 SCORE: 1.96

## 2025-06-05 ASSESSMENT — ENCOUNTER SYMPTOMS
HEARTBURN: 0
CHILLS: 0
HEMOPTYSIS: 0
WEIGHT LOSS: 0
PALPITATIONS: 0
SPUTUM PRODUCTION: 0
COUGH: 0
SHORTNESS OF BREATH: 1
WHEEZING: 0
DIZZINESS: 0
FEVER: 0
DEPRESSION: 0
STRIDOR: 0

## 2025-06-05 NOTE — PROGRESS NOTES
Pulmonary Clinic follow up    Date of Service: 4/18/2025    Reason for follow up:  Follow-Up (Dx/Last seen: MARY (obstructive sleep apnea) 4/18/25 Cezar Del Real //Tests completed: PFT 5/16/25//Test not completed: Polysomnography titration )    History of Present Illness:     Deo Beard is a 59 y.o. male being evaluated in clinic today for RAD. Patient is followed in the cardiology clinic for HFpEF stage C. PMH includes MARY, emphysema, HTN, HLD, hypoxia, ischemic cardiomyopathy, migraines, DM II, scoliosis, methamphetamines use in 1990s. Patient was on PAP therapy 20 years ago. Patient has a smoking history of 50 pack years, quit in 2012. Patient has never had formal PFT but endorses chronic bronchitis that was worse while he was smoking. Patient was started on Mounjaro injections for DM II which will also help his MARY with the weight loss. Patient has hx of gastric sleeve surgery in [place] that was not successful, and he states the surgeon ended up committing suicide d/t significant malpractice suits with his unsuccessful surgeries. Patient had questions about the Inspire device although his BMI is too elevated at this time. 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  PFT in May 2025 with trend towards bronchodilator response, most significant in the mid flows, with evidence of air trapping consistent with RAD/asthma.  Patient is pending sleep study in July 2025, follow-up with sleep medicine later that month.    Review of Systems   Constitutional:  Positive for malaise/fatigue. Negative for chills, fever and weight loss.   Respiratory:  Positive for shortness of breath. Negative for cough, hemoptysis, sputum production, wheezing and stridor.    Cardiovascular:  Negative for chest pain, palpitations and leg swelling.   Gastrointestinal:  Negative for heartburn.   Neurological:  Negative for dizziness.   Psychiatric/Behavioral:  Negative for depression.        Current Outpatient  Medications on File Prior to Visit   Medication Sig Dispense Refill    Tirzepatide (MOUNJARO) 7.5 MG/0.5ML Solution Auto-injector Inject 7.5 mg under the skin every 7 days. 2 mL 5    lisinopril (PRINIVIL) 5 MG Tab Take 1 Tablet by mouth every day.      spironolactone (ALDACTONE) 50 MG Tab Take 1 Tablet by mouth every day. 90 Tablet 3    Cholecalciferol (VITAMIN D) 125 MCG (5000 UT) Cap Take 1 capsule by mouth every day. 90 Capsule 2    carvedilol (COREG) 25 MG Tab Take 1 Tablet by mouth 2 times a day with meals. 180 Tablet 3    rosuvastatin (CRESTOR) 20 MG Tab Take 1 Tablet by mouth every evening. 90 Tablet 3    Blood Glucose Monitoring Suppl (BLOOD GLUCOSE MONITOR SYSTEM) w/Device Kit Test blood sugar as recommended by provider. 1 Kit 0    glucose blood strip Use one strip to test blood sugar once daily early morning before first meal. 100 Strip 3    Lancets Use one lancet to test blood sugar once daily early morning before first meal. 100 Each 3    Alcohol Swabs Wipe site with prep pad prior to injection. 100 Each 3    [DISCONTINUED] carvedilol (COREG) 12.5 MG Tab Take 1 Tablet by mouth 2 times a day with meals. 180 Tablet 3    diclofenac sodium (VOLTAREN) 1 % Gel APPLY 2 GRAMS TOPICALLY 4 TIMES DAILY AS NEEDED FOR PAIN 200 g 0    furosemide (LASIX) 20 MG Tab TAKE 1 TABLET BY MOUTH ONCE A DAY ONLY IF NEEDED FOR LEG SWELLING. 90 Tablet 2    DULoxetine (CYMBALTA) 30 MG Cap DR Particles Take 1 cap(s) orally every day. 30 Capsule 0    aspirin 81 MG EC tablet Take 81 mg by mouth every day. CHECK WITH PRESCRIBING PHYSICIAN FOR INSTRUCTIONS FOR PROCEDURE 7/31/24      NON SPECIFIED Knee brace 1 Each 0     No current facility-administered medications on file prior to visit.       Social History     Tobacco Use    Smoking status: Former     Current packs/day: 0.00     Average packs/day: 2.5 packs/day for 20.0 years (50.0 ttl pk-yrs)     Types: Cigarettes     Start date: 9/26/1992     Quit date: 9/26/2012     Years since  "quittin.6    Smokeless tobacco: Never   Vaping Use    Vaping status: Never Used   Substance Use Topics    Alcohol use: Not Currently     Comment: every 2 months    Drug use: Not Currently     Types: Intravenous     Comment: history of meth times one        Past Medical History:   Diagnosis Date    Abnormal EKG 2012    Abnormal EKG 2012    Bronchitis 2024    history of    CAD (coronary artery disease)     Cataract 2024    cataracts at present both eyes, no surgery as yet    Dental disorder 2024    upper and lower dentures    Depression 2024    medicated    Diabetes (HCC) 2024    medicated    High cholesterol 2024    medicated    HTN (hypertension) 2024    medicated    Hyperlipidemia     Hypoxia 2012    Ischemic cardiomyopathy     Leukocytosis (leucocytosis) 2012    Medically noncompliant 2013    MI (myocardial infarction) (HCC) 2024    Silent MI , ( old), noted on EKG in , no residual issues    Migraine     Obesity (BMI 30-39.9) 2018    Pain 2024    back    Personal history of venous thrombosis and embolism 2024    left leg \"years ago\"    Pneumonia 2024    5 months ago    Polysubstance abuse ([facility]) 2013    [place] spotted fever     2017    Sleep apnea, obstructive 2024    CPAP , doesn't use at present, lost 100 lbs    Snoring 2024    at times    Substance abuse ([facility])     ; Meth    Tobacco abuse 2013       Past Surgical History:   Procedure Laterality Date    GASTRIC BANDING LAPAROSCOPIC      gastric sleeve and switch    PB INCIS/DRAIN FOREARM DEEP ABSCESS      left AC due to IV drug abuse    HYDROCELECTOMY ADULT      ORCHIECTOMY Right     ORIF, WRIST      left wrist       Patient has no known allergies.    Family History   Problem Relation Age of Onset    Cancer Mother         intestine    Heart Disease Father         CHF    Stroke Father     Heart " "Attack Father     Cancer Brother         Colon Cancer    Arthritis Maternal Grandmother     Arthritis Maternal Grandfather     Arthritis Paternal Grandmother     Cancer Paternal Grandfather     Heart Disease Paternal Grandfather        /64   Pulse 88   Ht 1.727 m (5' 8\")   Wt (!) 154 kg (340 lb)   SpO2 93%      Physical Exam  Constitutional:       General: He is not in acute distress.  HENT:      Head: Normocephalic.      Nose: Nose normal.      Mouth/Throat:      Mouth: Mucous membranes are moist.      Comments: (+) oral crowding   Eyes:      Conjunctiva/sclera: Conjunctivae normal.   Cardiovascular:      Rate and Rhythm: Normal rate and regular rhythm.      Heart sounds: No murmur heard.  Pulmonary:      Effort: Pulmonary effort is normal. No respiratory distress.      Breath sounds: Normal breath sounds. No wheezing.   Abdominal:      General: There is no distension.   Musculoskeletal:      Right lower leg: No edema.      Left lower leg: No edema.   Skin:     General: Skin is warm and dry.      Capillary Refill: Capillary refill takes less than 2 seconds.      Nails: There is no clubbing.   Neurological:      General: No focal deficit present.      Mental Status: He is alert and oriented to person, place, and time.   Psychiatric:         Mood and Affect: Mood normal.       Results:    PFT May 2025:     trend towards bronchodilator response, most significant in the mid flows, with evidence of air trapping consistent with RAD/asthma    CT chest 8/21/2023:    Lungs: The lungs are clear without focal infiltrate or consolidation. There is   no pleural effusion or pneumothorax. The airway is patent without bronchial wall   thickening.     Mediastinum: The heart is not enlarged. There is no pericardial effusion. The   aorta is normal caliber and unremarkable. There are no enlarged hilar or   mediastinal lymph nodes. There is intrathoracic extension of the left thyroid,   as before. The esophagus is " unremarkable.     Echocardiogram 6/19/2023:    Left Ventricle  3mL of contrast was used to enhance visualization of the endocardial   border. IV was started by Ba Martínez RN at the left thumb. The left   ventricle is not well visualized. Normal left ventricular chamber size.   Moderate concentric left ventricular hypertrophy. Diastolic function is   difficult to assess secondary to suboptimal diastolic parameters.   Normal left ventricular systolic function. The left ventricular   ejection fraction is visually estimated to be 65%.     Right Ventricle  The right ventricle is not well visualized.     Right Atrium  The right atrium is not well visualized. The inferior vena cava is not   well visualized.     Left Atrium  The left atrium is not well visualized.     Mitral Valve  The mitral valve is not well visualized. Structurally normal mitral   valve without significant stenosis. Trace mitral regurgitation.     Aortic Valve  The aortic valve is not well visualized. No aortic valve stenosis. No   aortic insufficiency.     Tricuspid Valve  The tricuspid valve is not well visualized.     Pulmonic Valve  The pulmonic valve is not well visualized.     Pericardium  Pericardium not well visualized. No pericardial effusion.     Aorta  The ascending aorta is not well visualized.        Vaccinations:    Encouraged annual covid and flu vaccines      Assessment & Plan  Mild intermittent reactive airway disease without complication    Symbicort/Breyna 80 twice daily.  Xopenex as needed due to history of A-fib, and extensive cardiac history.  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    Orders:    budesonide-formoterol (BREYNA) 80-4.5 MCG/ACT Aerosol; Inhale 2 Puffs 2 times a day.    levalbuterol (XOPENEX HFA) 45 MCG/ACT inhaler; Inhale 1-2 Puffs every four hours as needed for Shortness of Breath.      Return in about 6 months (around 12/5/2025), or if symptoms worsen or  fail to improve, for f/u on mild RAD.     This note was generated using voice recognition software which has a chance of producing errors of grammar and possibly content.  I have made every reasonable attempt to find and correct any obvious errors, but it should be expected that some may not be found prior to finalization of this note.    Time spent in record review prior to patient arrival, reviewing results, and in face-to-face encounter totaled 31 min, excluding any procedures if performed.    Cezar RAM  [facility]

## 2025-06-06 PROCEDURE — RXMED WILLOW AMBULATORY MEDICATION CHARGE

## 2025-06-09 PROCEDURE — RXMED WILLOW AMBULATORY MEDICATION CHARGE: Performed by: NURSE PRACTITIONER

## 2025-06-10 ENCOUNTER — PHARMACY VISIT (OUTPATIENT)
Dept: PHARMACY | Facility: MEDICAL CENTER | Age: 60
End: 2025-06-10
Payer: COMMERCIAL

## 2025-06-12 DIAGNOSIS — E55.9 VITAMIN D DEFICIENCY: ICD-10-CM

## 2025-06-12 NOTE — TELEPHONE ENCOUNTER
Received request via: Patient    Was the patient seen in the last year in this department? Yes    Does the patient have an active prescription (recently filled or refills available) for medication(s) requested? No    Pharmacy Name: [facility]     Does the patient have [facility] and need 100-day supply? (This applies to ALL medications) Patient does not have SCP      Patient has been trying to get this medication refilled however [facility] would not allow him to because the rx needed to be re written.

## 2025-06-19 ENCOUNTER — NON-PROVIDER VISIT (OUTPATIENT)
Dept: CARDIOLOGY | Facility: MEDICAL CENTER | Age: 60
End: 2025-06-19
Attending: NURSE PRACTITIONER
Payer: COMMERCIAL

## 2025-06-19 VITALS
WEIGHT: 315 LBS | BODY MASS INDEX: 52.61 KG/M2 | DIASTOLIC BLOOD PRESSURE: 74 MMHG | HEART RATE: 73 BPM | SYSTOLIC BLOOD PRESSURE: 134 MMHG

## 2025-06-19 DIAGNOSIS — E78.2 MIXED HYPERLIPIDEMIA: ICD-10-CM

## 2025-06-19 DIAGNOSIS — I50.9 CONGESTIVE HEART FAILURE, UNSPECIFIED HF CHRONICITY, UNSPECIFIED HEART FAILURE TYPE (HCC): Primary | ICD-10-CM

## 2025-06-19 DIAGNOSIS — E11.9 TYPE 2 DIABETES MELLITUS WITHOUT COMPLICATION, WITHOUT LONG-TERM CURRENT USE OF INSULIN (HCC): ICD-10-CM

## 2025-06-19 PROCEDURE — 99212 OFFICE O/P EST SF 10 MIN: CPT | Performed by: PHARMACIST

## 2025-06-19 RX ORDER — TIRZEPATIDE 10 MG/.5ML
10 INJECTION, SOLUTION SUBCUTANEOUS
Qty: 2 ML | Refills: 5 | Status: SHIPPED | OUTPATIENT
Start: 2025-06-19

## 2025-06-19 ASSESSMENT — FIBROSIS 4 INDEX: FIB4 SCORE: 1.96

## 2025-06-19 NOTE — PROGRESS NOTES
CHF Pharmacotherapy Visit    Date of Referral: 1/30/25    Deo Beard is here for DM, CHF, and lipids    HPI  Pertinent Interval History since last visit:   No significant interval hx to note.    Potential Barriers to Care:  Adherence: denies missed doses  Side effects: none  Affordability: no issues    Diabetes Data Review:  Lab Results   Component Value Date/Time    HBA1C 6.2 (H) 04/12/2025 08:19 AM      Home Blood Glucose Readings:  Not routinely monitoring d/t controlled A1c    Current diabetes medications:  Mounjaro 7.5 mg SQ Q7D      Lipids Data Review:   Lab Results   Component Value Date/Time    CHOLSTRLTOT 91 (L) 04/12/2025 08:19 AM    LDL 36 04/12/2025 08:19 AM    HDL 37 (A) 04/12/2025 08:19 AM    TRIGLYCERIDE 88 04/12/2025 08:19 AM       Current lipid medications:  rosuvastatin 20 mg daily     Most recent EF:  65% 06/19/23  45% to 50% 07/04/22    Current CHF Medications - including dose:   Entresto or ACE/ARB: None  Beta blocker: carvedilol 25 mg BID   Diuretic: Furosemide 20 mg once daily PRN - has not been using  Aldosterone antagonist: spironolactone 50 mg daily  SGLT2i: None - developed UTI/yeast infection w/ Farxiga    Home BP and HR: Not routinely monitoring at home.    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 Has been working in his yard more lately.  Diet: Decreased appetite lately w/ Mounjaro  Breakfast - Sausage and egg croissant  Lunch - Bologna sandwich w/ chips  Dinner - Varies. Typically meat and vegetable  Snacks - None typically  Drinks - Pepsi x 1-2 per day (regular - he's unwilling to DC), water, unsweetened ice tea    DATA REVIEW  There were no vitals filed for this visit.      Lab Results   Component Value Date/Time    SODIUM 138 04/12/2025 08:19 AM    POTASSIUM 4.4 04/12/2025 08:19 AM    CHLORIDE 103 04/12/2025 08:19 AM    CO2 25 04/12/2025 08:19  "AM    GLUCOSE 91 04/12/2025 08:19 AM    BUN 22 04/12/2025 08:19 AM    CREATININE 0.86 04/12/2025 08:19 AM    GLOMRATE 113 08/21/2023 01:56 PM     Lab Results   Component Value Date/Time    ALKPHOSPHAT 70 04/12/2025 08:19 AM    ASTSGOT 28 04/12/2025 08:19 AM    ALTSGPT 19 04/12/2025 08:19 AM    TBILIRUBIN 0.6 04/12/2025 08:19 AM    INR 0.93 07/31/2024 09:43 AM    ALBUMIN 4.0 04/12/2025 08:19 AM      No components found for: \"MICROALBUMINCREATRATIOURINE\"    Renal function:  Calculated creatinine clearance: >100 ml/min   eGFR: >60 mL/min/1.73 m2    Recent Imaging Studies:    None since last visit    ASSESSMENT AND PLAN  T2DM  Goals: FBG 80 - 130, 2hPP < 180, a1c < 7.0%  Repeat A1c was 6.2% on 4/12/25 which is at goal and worsened from previous (5.9% on 1/25/2025).  No routinely monitoring his home BG d/t controlled DM.  Since last visit, pt was able to increase Mounjaro dose up to 7.5 mg w/ no issues.   Pt notes unchanged appetite suppression w/ recent dose increase. He's interested in further optimization of Mounjaro today.    Diabetes medications (changes are bolded)  INCREASE Mounjaro up to 10 mg SQ Q7D  Will exhaust current ~ 3 week supply of 7.5 mg prior to starting 10 mg dose.    CHF  Since last visit, pt has been well.  Pt with HFpEF and has been stable. Does note some HINOJOSA.  Clinic vitals typically at goal. BP mildly elevated today - pt attributes to stressful commute to clinic. Will consider addition of ACEi/ARB at f/u if BP above goal.    CHF medications (changes are bolded)  Entresto or ACE/ARB: None  Beta blocker: carvedilol 25 mg BID   Diuretic: none  Aldosterone antagonist: spironolactone 50 mg daily  SGLT2i: None - developed UTI/yeast infection    3. Hyperlipidemia  Patient type: Secondary Prevention - hx of MI  Goal: LDL-C:   <70 mg/dL  Most recent lipid panel shows all markers at goal aside from HDL. Discussed dietary measures to improve.  Confirmed that pt only taking rosuvastatin 20 mg once daily at " this time.    Lipid medications (changes are bolded)  Rosuvastatin 20 mg daily    4. Lifestyle   Recommendations From Today's Visit:   Diet: Maximize lean proteins and veggies. Cut out/down on carbs. Avoid simple sugars.   Exercise: Start simple by walking daily and Increase as tolerated. Goal of 150 min/week.    Blood Work Ordered At Today's visit:   CMP, lipid panel, lipoA, apoB, & A1c    Follow-Up:   ~ 6 weeks     Cassius Ventura, [place]    CC:  Marian Chang P.A.-C.

## 2025-06-20 ENCOUNTER — TELEPHONE (OUTPATIENT)
Dept: CARDIOLOGY | Facility: MEDICAL CENTER | Age: 60
End: 2025-06-20
Payer: COMMERCIAL

## 2025-06-20 PROCEDURE — RXMED WILLOW AMBULATORY MEDICATION CHARGE: Performed by: PHYSICIAN ASSISTANT

## 2025-06-20 PROCEDURE — RXMED WILLOW AMBULATORY MEDICATION CHARGE: Performed by: NURSE PRACTITIONER

## 2025-06-20 NOTE — TELEPHONE ENCOUNTER
Received [employer] request via [employer]  for Tirzepatide (MOUNJARO) 10 MG/0.5ML Solution Auto-injector. (Quantity:2, Day Supply:28)     Insurance: [employer]  Member ID:  [id number]  BIN: [id number]  PCN: [id number]  Group: [id number]     Ran Test claim via [employer] [facility] for a $0 copay. Deo already fills with [facility]. Will contact him to set up delivery to his residence.

## 2025-06-25 ENCOUNTER — PHARMACY VISIT (OUTPATIENT)
Dept: PHARMACY | Facility: MEDICAL CENTER | Age: 60
End: 2025-06-25
Payer: COMMERCIAL

## 2025-07-01 ENCOUNTER — TELEPHONE (OUTPATIENT)
Dept: HEALTH INFORMATION MANAGEMENT | Facility: OTHER | Age: 60
End: 2025-07-01
Payer: COMMERCIAL

## 2025-07-03 PROCEDURE — RXMED WILLOW AMBULATORY MEDICATION CHARGE: Performed by: NURSE PRACTITIONER

## 2025-07-03 PROCEDURE — RXMED WILLOW AMBULATORY MEDICATION CHARGE

## 2025-07-05 ENCOUNTER — APPOINTMENT (OUTPATIENT)
Dept: SLEEP MEDICINE | Facility: MEDICAL CENTER | Age: 60
End: 2025-07-05
Payer: COMMERCIAL

## 2025-07-07 ENCOUNTER — TELEPHONE (OUTPATIENT)
Dept: SLEEP MEDICINE | Facility: MEDICAL CENTER | Age: 60
End: 2025-07-07
Payer: COMMERCIAL

## 2025-07-07 NOTE — TELEPHONE ENCOUNTER
1ST NO SHOW  Date of Service 07/05/2025  Provider [facility]  Reason for visit in lab sleep study  Contacted patient yes by phone  Outcome rescheduled with patient  Reason visit was missed no reason given

## 2025-07-09 ENCOUNTER — PHARMACY VISIT (OUTPATIENT)
Dept: PHARMACY | Facility: MEDICAL CENTER | Age: 60
End: 2025-07-09
Payer: COMMERCIAL

## 2025-07-21 PROCEDURE — RXMED WILLOW AMBULATORY MEDICATION CHARGE

## 2025-07-21 PROCEDURE — RXMED WILLOW AMBULATORY MEDICATION CHARGE: Performed by: NURSE PRACTITIONER

## 2025-07-23 ENCOUNTER — PHARMACY VISIT (OUTPATIENT)
Dept: PHARMACY | Facility: MEDICAL CENTER | Age: 60
End: 2025-07-23
Payer: COMMERCIAL

## 2025-07-25 ENCOUNTER — SLEEP STUDY (OUTPATIENT)
Dept: SLEEP MEDICINE | Facility: MEDICAL CENTER | Age: 60
End: 2025-07-25
Payer: COMMERCIAL

## 2025-07-25 DIAGNOSIS — G47.33 OSA (OBSTRUCTIVE SLEEP APNEA): ICD-10-CM

## 2025-07-25 PROCEDURE — 95811 POLYSOM 6/>YRS CPAP 4/> PARM: CPT | Mod: 26 | Performed by: STUDENT IN AN ORGANIZED HEALTH CARE EDUCATION/TRAINING PROGRAM

## 2025-07-28 ENCOUNTER — APPOINTMENT (OUTPATIENT)
Dept: SLEEP MEDICINE | Facility: MEDICAL CENTER | Age: 60
End: 2025-07-28
Attending: NURSE PRACTITIONER
Payer: COMMERCIAL

## 2025-08-02 ENCOUNTER — HOSPITAL ENCOUNTER (OUTPATIENT)
Dept: LAB | Facility: MEDICAL CENTER | Age: 60
End: 2025-08-02
Attending: NURSE PRACTITIONER
Payer: COMMERCIAL

## 2025-08-02 DIAGNOSIS — I25.10 CORONARY ARTERY DISEASE DUE TO LIPID RICH PLAQUE: ICD-10-CM

## 2025-08-02 DIAGNOSIS — E11.9 TYPE 2 DIABETES MELLITUS WITHOUT COMPLICATION, WITHOUT LONG-TERM CURRENT USE OF INSULIN (HCC): ICD-10-CM

## 2025-08-02 DIAGNOSIS — E78.2 MIXED HYPERLIPIDEMIA: ICD-10-CM

## 2025-08-02 DIAGNOSIS — I25.83 CORONARY ARTERY DISEASE DUE TO LIPID RICH PLAQUE: ICD-10-CM

## 2025-08-02 DIAGNOSIS — I50.9 CONGESTIVE HEART FAILURE, UNSPECIFIED HF CHRONICITY, UNSPECIFIED HEART FAILURE TYPE (HCC): ICD-10-CM

## 2025-08-02 DIAGNOSIS — Z79.899 HIGH RISK MEDICATION USE: ICD-10-CM

## 2025-08-02 DIAGNOSIS — I50.20 ACC/AHA STAGE C SYSTOLIC HEART FAILURE (HCC): ICD-10-CM

## 2025-08-02 LAB
ALBUMIN SERPL BCP-MCNC: 4.1 G/DL (ref 3.2–4.9)
ALBUMIN SERPL BCP-MCNC: 4.2 G/DL (ref 3.2–4.9)
ALBUMIN/GLOB SERPL: 1.1 G/DL
ALBUMIN/GLOB SERPL: 1.1 G/DL
ALP SERPL-CCNC: 77 U/L (ref 30–99)
ALP SERPL-CCNC: 79 U/L (ref 30–99)
ALT SERPL-CCNC: 30 U/L (ref 2–50)
ALT SERPL-CCNC: 31 U/L (ref 2–50)
ANION GAP SERPL CALC-SCNC: 12 MMOL/L (ref 7–16)
ANION GAP SERPL CALC-SCNC: 13 MMOL/L (ref 7–16)
AST SERPL-CCNC: 35 U/L (ref 12–45)
AST SERPL-CCNC: 35 U/L (ref 12–45)
BILIRUB SERPL-MCNC: 1.2 MG/DL (ref 0.1–1.5)
BILIRUB SERPL-MCNC: 1.2 MG/DL (ref 0.1–1.5)
BUN SERPL-MCNC: 22 MG/DL (ref 8–22)
BUN SERPL-MCNC: 22 MG/DL (ref 8–22)
CALCIUM ALBUM COR SERPL-MCNC: 8.6 MG/DL (ref 8.5–10.5)
CALCIUM ALBUM COR SERPL-MCNC: 8.7 MG/DL (ref 8.5–10.5)
CALCIUM SERPL-MCNC: 8.8 MG/DL (ref 8.5–10.5)
CALCIUM SERPL-MCNC: 8.8 MG/DL (ref 8.5–10.5)
CHLORIDE SERPL-SCNC: 102 MMOL/L (ref 96–112)
CHLORIDE SERPL-SCNC: 104 MMOL/L (ref 96–112)
CHOLEST SERPL-MCNC: 136 MG/DL (ref 100–199)
CHOLEST SERPL-MCNC: 137 MG/DL (ref 100–199)
CO2 SERPL-SCNC: 25 MMOL/L (ref 20–33)
CO2 SERPL-SCNC: 25 MMOL/L (ref 20–33)
CREAT SERPL-MCNC: 0.77 MG/DL (ref 0.5–1.4)
CREAT SERPL-MCNC: 0.77 MG/DL (ref 0.5–1.4)
EST. AVERAGE GLUCOSE BLD GHB EST-MCNC: 114 MG/DL
FASTING STATUS PATIENT QL REPORTED: NORMAL
FASTING STATUS PATIENT QL REPORTED: NORMAL
GFR SERPLBLD CREATININE-BSD FMLA CKD-EPI: 103 ML/MIN/1.73 M 2
GFR SERPLBLD CREATININE-BSD FMLA CKD-EPI: 103 ML/MIN/1.73 M 2
GLOBULIN SER CALC-MCNC: 3.7 G/DL (ref 1.9–3.5)
GLOBULIN SER CALC-MCNC: 3.8 G/DL (ref 1.9–3.5)
GLUCOSE SERPL-MCNC: 85 MG/DL (ref 65–99)
GLUCOSE SERPL-MCNC: 86 MG/DL (ref 65–99)
HBA1C MFR BLD: 5.6 % (ref 4–5.6)
HDLC SERPL-MCNC: 40 MG/DL
HDLC SERPL-MCNC: 40 MG/DL
LDLC SERPL CALC-MCNC: 79 MG/DL
LDLC SERPL CALC-MCNC: 79 MG/DL
POTASSIUM SERPL-SCNC: 3.9 MMOL/L (ref 3.6–5.5)
POTASSIUM SERPL-SCNC: 4 MMOL/L (ref 3.6–5.5)
PROT SERPL-MCNC: 7.8 G/DL (ref 6–8.2)
PROT SERPL-MCNC: 8 G/DL (ref 6–8.2)
SODIUM SERPL-SCNC: 140 MMOL/L (ref 135–145)
SODIUM SERPL-SCNC: 141 MMOL/L (ref 135–145)
TRIGL SERPL-MCNC: 86 MG/DL (ref 0–149)
TRIGL SERPL-MCNC: 88 MG/DL (ref 0–149)

## 2025-08-02 PROCEDURE — 36415 COLL VENOUS BLD VENIPUNCTURE: CPT

## 2025-08-02 PROCEDURE — 83036 HEMOGLOBIN GLYCOSYLATED A1C: CPT

## 2025-08-02 PROCEDURE — 80053 COMPREHEN METABOLIC PANEL: CPT | Mod: 91

## 2025-08-02 PROCEDURE — 83695 ASSAY OF LIPOPROTEIN(A): CPT

## 2025-08-02 PROCEDURE — 80061 LIPID PANEL: CPT

## 2025-08-02 PROCEDURE — 80053 COMPREHEN METABOLIC PANEL: CPT

## 2025-08-02 PROCEDURE — 82172 ASSAY OF APOLIPOPROTEIN: CPT

## 2025-08-02 PROCEDURE — 80061 LIPID PANEL: CPT | Mod: 91

## 2025-08-04 ENCOUNTER — RESULTS FOLLOW-UP (OUTPATIENT)
Dept: CARDIOLOGY | Facility: MEDICAL CENTER | Age: 60
End: 2025-08-04
Payer: COMMERCIAL

## 2025-08-04 ENCOUNTER — OFFICE VISIT (OUTPATIENT)
Dept: MEDICAL GROUP | Facility: CLINIC | Age: 60
End: 2025-08-04
Payer: COMMERCIAL

## 2025-08-04 VITALS
OXYGEN SATURATION: 93 % | HEIGHT: 68 IN | DIASTOLIC BLOOD PRESSURE: 66 MMHG | BODY MASS INDEX: 47.74 KG/M2 | HEART RATE: 82 BPM | RESPIRATION RATE: 18 BRPM | SYSTOLIC BLOOD PRESSURE: 118 MMHG | TEMPERATURE: 98.1 F | WEIGHT: 315 LBS

## 2025-08-04 DIAGNOSIS — Z12.5 PROSTATE CANCER SCREENING: ICD-10-CM

## 2025-08-04 DIAGNOSIS — I25.10 CORONARY ARTERY DISEASE DUE TO LIPID RICH PLAQUE: ICD-10-CM

## 2025-08-04 DIAGNOSIS — E78.5 DYSLIPIDEMIA: ICD-10-CM

## 2025-08-04 DIAGNOSIS — E78.2 MIXED HYPERLIPIDEMIA: ICD-10-CM

## 2025-08-04 DIAGNOSIS — I20.9 ANGINA PECTORIS (HCC): ICD-10-CM

## 2025-08-04 DIAGNOSIS — E11.9 TYPE 2 DIABETES MELLITUS, WITHOUT LONG-TERM CURRENT USE OF INSULIN (HCC): Primary | ICD-10-CM

## 2025-08-04 DIAGNOSIS — I25.83 CORONARY ARTERY DISEASE DUE TO LIPID RICH PLAQUE: ICD-10-CM

## 2025-08-04 LAB — APO B100 SERPL-MCNC: 71 MG/DL (ref 66–133)

## 2025-08-04 PROCEDURE — 99213 OFFICE O/P EST LOW 20 MIN: CPT | Performed by: PHYSICIAN ASSISTANT

## 2025-08-04 PROCEDURE — 3074F SYST BP LT 130 MM HG: CPT | Performed by: PHYSICIAN ASSISTANT

## 2025-08-04 PROCEDURE — 3078F DIAST BP <80 MM HG: CPT | Performed by: PHYSICIAN ASSISTANT

## 2025-08-04 ASSESSMENT — PATIENT HEALTH QUESTIONNAIRE - PHQ9: CLINICAL INTERPRETATION OF PHQ2 SCORE: 0

## 2025-08-04 ASSESSMENT — FIBROSIS 4 INDEX: FIB4 SCORE: 1.92

## 2025-08-05 ENCOUNTER — HOSPITAL ENCOUNTER (OUTPATIENT)
Facility: MEDICAL CENTER | Age: 60
End: 2025-08-05
Attending: PHYSICIAN ASSISTANT
Payer: COMMERCIAL

## 2025-08-05 DIAGNOSIS — E11.9 TYPE 2 DIABETES MELLITUS, WITHOUT LONG-TERM CURRENT USE OF INSULIN (HCC): ICD-10-CM

## 2025-08-05 LAB — LPA SERPL-MCNC: <6 MG/DL

## 2025-08-05 PROCEDURE — 82043 UR ALBUMIN QUANTITATIVE: CPT

## 2025-08-05 PROCEDURE — 82570 ASSAY OF URINE CREATININE: CPT

## 2025-08-05 RX ORDER — ROSUVASTATIN CALCIUM 40 MG/1
40 TABLET, COATED ORAL EVERY EVENING
Qty: 90 TABLET | Refills: 2 | Status: SHIPPED | OUTPATIENT
Start: 2025-08-05

## 2025-08-06 LAB
CREAT UR-MCNC: 211 MG/DL
MICROALBUMIN UR-MCNC: 14.9 MG/DL
MICROALBUMIN/CREAT UR: 71 MG/G (ref 0–30)

## 2025-08-06 PROCEDURE — RXMED WILLOW AMBULATORY MEDICATION CHARGE: Performed by: NURSE PRACTITIONER

## 2025-08-06 PROCEDURE — RXMED WILLOW AMBULATORY MEDICATION CHARGE

## 2025-08-11 ENCOUNTER — PHARMACY VISIT (OUTPATIENT)
Dept: PHARMACY | Facility: MEDICAL CENTER | Age: 60
End: 2025-08-11
Payer: COMMERCIAL

## 2025-08-14 PROCEDURE — RXMED WILLOW AMBULATORY MEDICATION CHARGE: Performed by: NURSE PRACTITIONER

## 2025-08-15 ENCOUNTER — OFFICE VISIT (OUTPATIENT)
Dept: SLEEP MEDICINE | Facility: MEDICAL CENTER | Age: 60
End: 2025-08-15
Attending: PHYSICIAN ASSISTANT
Payer: COMMERCIAL

## 2025-08-15 ENCOUNTER — APPOINTMENT (OUTPATIENT)
Dept: MEDICAL GROUP | Facility: PHYSICIAN GROUP | Age: 60
End: 2025-08-15
Payer: COMMERCIAL

## 2025-08-15 VITALS
HEIGHT: 68 IN | OXYGEN SATURATION: 94 % | HEART RATE: 64 BPM | RESPIRATION RATE: 18 BRPM | SYSTOLIC BLOOD PRESSURE: 110 MMHG | WEIGHT: 315 LBS | DIASTOLIC BLOOD PRESSURE: 70 MMHG | BODY MASS INDEX: 47.74 KG/M2

## 2025-08-15 DIAGNOSIS — G47.33 OSA (OBSTRUCTIVE SLEEP APNEA): Primary | ICD-10-CM

## 2025-08-15 PROCEDURE — 99213 OFFICE O/P EST LOW 20 MIN: CPT | Performed by: PHYSICIAN ASSISTANT

## 2025-08-15 PROCEDURE — 3074F SYST BP LT 130 MM HG: CPT | Performed by: PHYSICIAN ASSISTANT

## 2025-08-15 PROCEDURE — 3078F DIAST BP <80 MM HG: CPT | Performed by: PHYSICIAN ASSISTANT

## 2025-08-15 ASSESSMENT — ENCOUNTER SYMPTOMS
SORE THROAT: 0
COUGH: 0
PALPITATIONS: 0
HEARTBURN: 0
FEVER: 0
ORTHOPNEA: 0
WHEEZING: 0
TREMORS: 0
SHORTNESS OF BREATH: 1
HEADACHES: 1
ROS GI COMMENTS: UPPER AND LOWER DENTURES, NO SWALLOWING ISSUES
INSOMNIA: 0
SPUTUM PRODUCTION: 0
SINUS PAIN: 0
WEIGHT LOSS: 0
DIZZINESS: 0
CHILLS: 0

## 2025-08-15 ASSESSMENT — FIBROSIS 4 INDEX: FIB4 SCORE: 1.92

## 2025-08-18 ENCOUNTER — SPECIALTY PHARMACY (OUTPATIENT)
Dept: CARDIOLOGY | Facility: MEDICAL CENTER | Age: 60
End: 2025-08-18
Payer: COMMERCIAL

## 2025-08-18 ENCOUNTER — PHARMACY VISIT (OUTPATIENT)
Dept: PHARMACY | Facility: MEDICAL CENTER | Age: 60
End: 2025-08-18
Payer: COMMERCIAL

## 2025-08-19 PROCEDURE — RXMED WILLOW AMBULATORY MEDICATION CHARGE

## 2025-08-21 ENCOUNTER — PHARMACY VISIT (OUTPATIENT)
Dept: PHARMACY | Facility: MEDICAL CENTER | Age: 60
End: 2025-08-21
Payer: COMMERCIAL

## 2025-08-29 ENCOUNTER — OFFICE VISIT (OUTPATIENT)
Dept: MEDICAL GROUP | Facility: PHYSICIAN GROUP | Age: 60
End: 2025-08-29
Payer: COMMERCIAL

## 2025-08-29 VITALS — OXYGEN SATURATION: 96 % | HEIGHT: 68 IN | BODY MASS INDEX: 47.74 KG/M2 | HEART RATE: 72 BPM | WEIGHT: 315 LBS

## 2025-08-29 DIAGNOSIS — E78.2 MIXED HYPERLIPIDEMIA: ICD-10-CM

## 2025-08-29 DIAGNOSIS — E11.9 TYPE 2 DIABETES MELLITUS WITHOUT COMPLICATION, WITHOUT LONG-TERM CURRENT USE OF INSULIN (HCC): Primary | ICD-10-CM

## 2025-08-29 DIAGNOSIS — I25.2 HISTORY OF MI (MYOCARDIAL INFARCTION): ICD-10-CM

## 2025-08-29 RX ORDER — EZETIMIBE 10 MG/1
10 TABLET ORAL DAILY
Qty: 90 TABLET | Refills: 1 | Status: SHIPPED | OUTPATIENT
Start: 2025-08-29

## 2025-08-29 RX ORDER — TIRZEPATIDE 12.5 MG/.5ML
12.5 INJECTION, SOLUTION SUBCUTANEOUS
Qty: 6 ML | Refills: 1 | Status: SHIPPED | OUTPATIENT
Start: 2025-08-29

## 2025-08-29 ASSESSMENT — FIBROSIS 4 INDEX: FIB4 SCORE: 1.92
